# Patient Record
Sex: MALE | Race: WHITE | Employment: FULL TIME | URBAN - METROPOLITAN AREA
[De-identification: names, ages, dates, MRNs, and addresses within clinical notes are randomized per-mention and may not be internally consistent; named-entity substitution may affect disease eponyms.]

---

## 2018-02-12 ENCOUNTER — TELEPHONE (OUTPATIENT)
Dept: FAMILY MEDICINE CLINIC | Age: 53
End: 2018-02-12

## 2018-11-13 ENCOUNTER — OFFICE VISIT (OUTPATIENT)
Dept: FAMILY MEDICINE CLINIC | Age: 53
End: 2018-11-13
Payer: COMMERCIAL

## 2018-11-13 VITALS
TEMPERATURE: 98.5 F | BODY MASS INDEX: 34.69 KG/M2 | DIASTOLIC BLOOD PRESSURE: 60 MMHG | WEIGHT: 221 LBS | RESPIRATION RATE: 16 BRPM | HEIGHT: 67 IN | SYSTOLIC BLOOD PRESSURE: 122 MMHG | HEART RATE: 72 BPM

## 2018-11-13 DIAGNOSIS — E78.2 MIXED HYPERLIPIDEMIA: Chronic | ICD-10-CM

## 2018-11-13 DIAGNOSIS — K21.9 GASTROESOPHAGEAL REFLUX DISEASE, ESOPHAGITIS PRESENCE NOT SPECIFIED: Chronic | ICD-10-CM

## 2018-11-13 DIAGNOSIS — F41.9 ANXIETY: ICD-10-CM

## 2018-11-13 DIAGNOSIS — Z12.5 SCREENING FOR PROSTATE CANCER: ICD-10-CM

## 2018-11-13 DIAGNOSIS — E11.9 TYPE 2 DIABETES MELLITUS WITHOUT COMPLICATION, WITH LONG-TERM CURRENT USE OF INSULIN (HCC): Primary | Chronic | ICD-10-CM

## 2018-11-13 DIAGNOSIS — Z79.4 TYPE 2 DIABETES MELLITUS WITHOUT COMPLICATION, WITH LONG-TERM CURRENT USE OF INSULIN (HCC): Primary | Chronic | ICD-10-CM

## 2018-11-13 DIAGNOSIS — I10 ESSENTIAL HYPERTENSION: Chronic | ICD-10-CM

## 2018-11-13 DIAGNOSIS — F32.A DEPRESSION, UNSPECIFIED DEPRESSION TYPE: ICD-10-CM

## 2018-11-13 PROCEDURE — 99204 OFFICE O/P NEW MOD 45 MIN: CPT | Performed by: NURSE PRACTITIONER

## 2018-11-13 RX ORDER — FLUOXETINE HYDROCHLORIDE 20 MG/1
1 CAPSULE ORAL DAILY
Refills: 3 | COMMUNITY
Start: 2018-10-16 | End: 2018-11-13 | Stop reason: SDUPTHER

## 2018-11-13 RX ORDER — FLUOXETINE HYDROCHLORIDE 20 MG/1
20 CAPSULE ORAL DAILY
Qty: 90 CAPSULE | Refills: 3 | Status: SHIPPED | OUTPATIENT
Start: 2018-11-13 | End: 2019-08-04 | Stop reason: SDUPTHER

## 2018-11-13 RX ORDER — LISINOPRIL 10 MG/1
1 TABLET ORAL DAILY
Refills: 3 | COMMUNITY
Start: 2018-10-16 | End: 2018-11-13 | Stop reason: SDUPTHER

## 2018-11-13 RX ORDER — INSULIN GLARGINE 100 [IU]/ML
80 INJECTION, SOLUTION SUBCUTANEOUS NIGHTLY
Refills: 3 | COMMUNITY
Start: 2018-11-10 | End: 2018-11-13 | Stop reason: SDUPTHER

## 2018-11-13 RX ORDER — PEN NEEDLE, DIABETIC 31 G X1/4"
1 NEEDLE, DISPOSABLE MISCELLANEOUS
Qty: 100 EACH | Refills: 3 | Status: SHIPPED | OUTPATIENT
Start: 2018-11-13 | End: 2019-08-04 | Stop reason: SDUPTHER

## 2018-11-13 RX ORDER — LISINOPRIL 10 MG/1
10 TABLET ORAL DAILY
Qty: 30 TABLET | Refills: 3 | Status: SHIPPED | OUTPATIENT
Start: 2018-11-13 | End: 2019-07-18 | Stop reason: SDUPTHER

## 2018-11-13 RX ORDER — ATORVASTATIN CALCIUM 40 MG/1
1 TABLET, FILM COATED ORAL DAILY
Refills: 3 | COMMUNITY
Start: 2018-08-26 | End: 2018-11-13 | Stop reason: SDUPTHER

## 2018-11-13 RX ORDER — OMEPRAZOLE 40 MG/1
40 CAPSULE, DELAYED RELEASE ORAL DAILY
Qty: 90 CAPSULE | Refills: 3 | Status: SHIPPED | OUTPATIENT
Start: 2018-11-13 | End: 2019-11-15 | Stop reason: SDUPTHER

## 2018-11-13 RX ORDER — ATORVASTATIN CALCIUM 40 MG/1
40 TABLET, FILM COATED ORAL DAILY
Qty: 90 TABLET | Refills: 0 | Status: SHIPPED | OUTPATIENT
Start: 2018-11-13 | End: 2019-10-04 | Stop reason: SDUPTHER

## 2018-11-13 RX ORDER — INSULIN GLARGINE 100 [IU]/ML
80 INJECTION, SOLUTION SUBCUTANEOUS NIGHTLY
Qty: 15 PEN | Refills: 3 | Status: SHIPPED | OUTPATIENT
Start: 2018-11-13 | End: 2019-01-04 | Stop reason: SDUPTHER

## 2018-11-13 ASSESSMENT — PATIENT HEALTH QUESTIONNAIRE - PHQ9
2. FEELING DOWN, DEPRESSED OR HOPELESS: 0
1. LITTLE INTEREST OR PLEASURE IN DOING THINGS: 0
SUM OF ALL RESPONSES TO PHQ QUESTIONS 1-9: 0
SUM OF ALL RESPONSES TO PHQ QUESTIONS 1-9: 0
SUM OF ALL RESPONSES TO PHQ9 QUESTIONS 1 & 2: 0

## 2018-11-15 ASSESSMENT — ENCOUNTER SYMPTOMS
EYES NEGATIVE: 1
GASTROINTESTINAL NEGATIVE: 1
RESPIRATORY NEGATIVE: 1
ALLERGIC/IMMUNOLOGIC NEGATIVE: 1

## 2018-11-19 LAB
A/G RATIO: 2 (ref 1.5–2.5)
ALBUMIN SERPL-MCNC: 4.9 GM/DL (ref 3.5–5)
ALP BLD-CCNC: 82 IU/L (ref 41–137)
ALT SERPL-CCNC: 63 IU/L (ref 10–40)
ANION GAP SERPL CALCULATED.3IONS-SCNC: 9 MMOL/L (ref 4–12)
AST SERPL-CCNC: 27 IU/L (ref 15–41)
BILIRUB SERPL-MCNC: 0.8 MG/DL (ref 0.2–1)
BUN BLDV-MCNC: 16 MG/DL (ref 7–20)
CALCIUM SERPL-MCNC: 9 MG/DL (ref 8.8–10.5)
CHLORIDE BLD-SCNC: 104 MEQ/L (ref 101–111)
CHOLESTEROL/HDL RELATIVE RISK: 4.3 (ref 4–5)
CHOLESTEROL: 155 MG/DL
CO2: 26 MEQ/L (ref 21–32)
CREAT SERPL-MCNC: 0.97 MG/DL (ref 0.7–1.3)
CREATININE CLEARANCE: >60
DIRECT-LDL / HDL RISK: 3
ESTIMATED AVERAGE GLUCOSE: 237 MG/DL
GLUCOSE: 138 MG/DL (ref 70–110)
HBA1C MFR BLD: 9.9 % (ref 4.4–6.4)
HDLC SERPL-MCNC: 36 MG/DL
LDL CHOLESTEROL DIRECT: 111 MG/DL
POTASSIUM SERPL-SCNC: 4.2 MEQ/L (ref 3.6–5)
PROSTATE SPECIFIC ANTIGEN: 0.52 NG/ML
SODIUM BLD-SCNC: 139 MEQ/L (ref 135–145)
TOTAL PROTEIN: 7.3 G/DL (ref 6.2–8)
TRIGL SERPL-MCNC: 163 MG/DL
VLDLC SERPL CALC-MCNC: 32 MG/DL

## 2018-11-21 ENCOUNTER — TELEPHONE (OUTPATIENT)
Dept: FAMILY MEDICINE CLINIC | Age: 53
End: 2018-11-21

## 2018-11-21 DIAGNOSIS — E11.9 TYPE 2 DIABETES MELLITUS WITHOUT COMPLICATION, WITH LONG-TERM CURRENT USE OF INSULIN (HCC): ICD-10-CM

## 2018-11-21 DIAGNOSIS — E78.2 MIXED HYPERLIPIDEMIA: Chronic | ICD-10-CM

## 2018-11-21 DIAGNOSIS — R74.8 ELEVATED LIVER ENZYMES: Primary | ICD-10-CM

## 2018-11-21 DIAGNOSIS — Z79.4 TYPE 2 DIABETES MELLITUS WITHOUT COMPLICATION, WITH LONG-TERM CURRENT USE OF INSULIN (HCC): ICD-10-CM

## 2019-01-04 RX ORDER — INSULIN GLARGINE 100 [IU]/ML
80 INJECTION, SOLUTION SUBCUTANEOUS NIGHTLY
Qty: 15 PEN | Refills: 3 | Status: SHIPPED | OUTPATIENT
Start: 2019-01-04 | End: 2019-01-16 | Stop reason: SDUPTHER

## 2019-01-16 ENCOUNTER — PATIENT MESSAGE (OUTPATIENT)
Dept: FAMILY MEDICINE CLINIC | Age: 54
End: 2019-01-16

## 2019-01-17 RX ORDER — INSULIN GLARGINE 100 [IU]/ML
80 INJECTION, SOLUTION SUBCUTANEOUS NIGHTLY
Qty: 8 PEN | Refills: 11 | Status: SHIPPED | OUTPATIENT
Start: 2019-01-17 | End: 2019-10-02 | Stop reason: SDUPTHER

## 2019-02-14 LAB
ALT SERPL-CCNC: 35 IU/L (ref 10–40)
AST SERPL-CCNC: 21 IU/L (ref 15–41)
CHOLESTEROL/HDL RELATIVE RISK: 4.6 (ref 4–5)
CHOLESTEROL: 153 MG/DL
DIRECT-LDL / HDL RISK: 3.6
HDLC SERPL-MCNC: 33 MG/DL
LDL CHOLESTEROL DIRECT: 122 MG/DL
TRIGL SERPL-MCNC: 117 MG/DL
VLDLC SERPL CALC-MCNC: 23 MG/DL

## 2019-02-21 ENCOUNTER — TELEPHONE (OUTPATIENT)
Dept: FAMILY MEDICINE CLINIC | Age: 54
End: 2019-02-21

## 2019-02-21 LAB
ESTIMATED AVERAGE GLUCOSE: 148 MG/DL
HBA1C MFR BLD: 6.8 % (ref 4.4–6.4)

## 2019-07-18 RX ORDER — LISINOPRIL 10 MG/1
10 TABLET ORAL DAILY
Qty: 90 TABLET | Refills: 0 | Status: SHIPPED | OUTPATIENT
Start: 2019-07-18 | End: 2019-10-29 | Stop reason: SDUPTHER

## 2019-07-26 ENCOUNTER — HOSPITAL ENCOUNTER (OUTPATIENT)
Age: 54
Discharge: HOME | End: 2019-07-26

## 2019-07-26 LAB
HBV SURFACE AB TITR SER: POSITIVE {TITER}
RUBELLA: 93.6 IU/ML

## 2019-07-28 LAB — RUBEOLA IGG: > 300 AU/ML

## 2019-07-29 LAB
MUMPS IGG TITER: 1.55
VZV IGG SER QL IA: 1.22

## 2019-08-05 RX ORDER — FLUOXETINE HYDROCHLORIDE 20 MG/1
20 CAPSULE ORAL DAILY
Qty: 90 CAPSULE | Refills: 0 | Status: SHIPPED | OUTPATIENT
Start: 2019-08-05 | End: 2019-10-02 | Stop reason: DRUGHIGH

## 2019-08-05 RX ORDER — PEN NEEDLE, DIABETIC 31 G X1/4"
1 NEEDLE, DISPOSABLE MISCELLANEOUS
Qty: 100 EACH | Refills: 0 | Status: SHIPPED | OUTPATIENT
Start: 2019-08-05 | End: 2019-12-18 | Stop reason: SDUPTHER

## 2019-10-02 ENCOUNTER — OFFICE VISIT (OUTPATIENT)
Dept: FAMILY MEDICINE CLINIC | Age: 54
End: 2019-10-02
Payer: COMMERCIAL

## 2019-10-02 VITALS
HEART RATE: 60 BPM | TEMPERATURE: 99.2 F | BODY MASS INDEX: 33.27 KG/M2 | DIASTOLIC BLOOD PRESSURE: 62 MMHG | WEIGHT: 212 LBS | RESPIRATION RATE: 12 BRPM | SYSTOLIC BLOOD PRESSURE: 116 MMHG | HEIGHT: 67 IN

## 2019-10-02 DIAGNOSIS — F33.8 SEASONAL AFFECTIVE DISORDER (HCC): ICD-10-CM

## 2019-10-02 DIAGNOSIS — Z79.4 TYPE 2 DIABETES MELLITUS WITHOUT COMPLICATION, WITH LONG-TERM CURRENT USE OF INSULIN (HCC): ICD-10-CM

## 2019-10-02 DIAGNOSIS — E78.2 MIXED HYPERLIPIDEMIA: Chronic | ICD-10-CM

## 2019-10-02 DIAGNOSIS — F41.8 DEPRESSION WITH ANXIETY: Primary | ICD-10-CM

## 2019-10-02 DIAGNOSIS — E11.9 TYPE 2 DIABETES MELLITUS WITHOUT COMPLICATION, WITH LONG-TERM CURRENT USE OF INSULIN (HCC): ICD-10-CM

## 2019-10-02 DIAGNOSIS — I10 ESSENTIAL HYPERTENSION: ICD-10-CM

## 2019-10-02 DIAGNOSIS — R74.8 ELEVATED LIVER ENZYMES: ICD-10-CM

## 2019-10-02 DIAGNOSIS — F33.1 MODERATE EPISODE OF RECURRENT MAJOR DEPRESSIVE DISORDER (HCC): ICD-10-CM

## 2019-10-02 DIAGNOSIS — Z12.5 SCREENING FOR PROSTATE CANCER: ICD-10-CM

## 2019-10-02 DIAGNOSIS — E78.2 MIXED HYPERLIPIDEMIA: ICD-10-CM

## 2019-10-02 LAB
ALBUMIN SERPL-MCNC: 4.5 G/DL (ref 3.5–5.1)
ALP BLD-CCNC: 80 U/L (ref 38–126)
ALT SERPL-CCNC: 27 U/L (ref 11–66)
ANION GAP SERPL CALCULATED.3IONS-SCNC: 13 MEQ/L (ref 8–16)
AST SERPL-CCNC: 18 U/L (ref 5–40)
AVERAGE GLUCOSE: 144 MG/DL (ref 70–126)
BILIRUB SERPL-MCNC: 0.3 MG/DL (ref 0.3–1.2)
BUN BLDV-MCNC: 18 MG/DL (ref 7–22)
CALCIUM SERPL-MCNC: 9.4 MG/DL (ref 8.5–10.5)
CHLORIDE BLD-SCNC: 102 MEQ/L (ref 98–111)
CHOLESTEROL, TOTAL: 194 MG/DL (ref 100–199)
CO2: 25 MEQ/L (ref 23–33)
CREAT SERPL-MCNC: 0.8 MG/DL (ref 0.4–1.2)
GFR SERPL CREATININE-BSD FRML MDRD: > 90 ML/MIN/1.73M2
GLUCOSE BLD-MCNC: 74 MG/DL (ref 70–108)
HBA1C MFR BLD: 6.8 % (ref 4.4–6.4)
HDLC SERPL-MCNC: 37 MG/DL
LDL CHOLESTEROL CALCULATED: 133 MG/DL
POTASSIUM SERPL-SCNC: 4.1 MEQ/L (ref 3.5–5.2)
PROSTATE SPECIFIC ANTIGEN: 0.42 NG/ML (ref 0–1)
SODIUM BLD-SCNC: 140 MEQ/L (ref 135–145)
TOTAL PROTEIN: 6.7 G/DL (ref 6.1–8)
TRIGL SERPL-MCNC: 119 MG/DL (ref 0–199)

## 2019-10-02 PROCEDURE — 99214 OFFICE O/P EST MOD 30 MIN: CPT | Performed by: NURSE PRACTITIONER

## 2019-10-02 RX ORDER — FLUOXETINE HYDROCHLORIDE 40 MG/1
40 CAPSULE ORAL DAILY
Qty: 90 CAPSULE | Refills: 3 | Status: SHIPPED | OUTPATIENT
Start: 2019-10-02 | End: 2019-10-04 | Stop reason: SDUPTHER

## 2019-10-02 RX ORDER — INSULIN GLARGINE 100 [IU]/ML
90 INJECTION, SOLUTION SUBCUTANEOUS NIGHTLY
Qty: 8 PEN | Refills: 11 | Status: SHIPPED | OUTPATIENT
Start: 2019-10-02 | End: 2019-11-15 | Stop reason: DRUGHIGH

## 2019-10-02 ASSESSMENT — ENCOUNTER SYMPTOMS
COUGH: 0
NAUSEA: 0
CONSTIPATION: 0
DIARRHEA: 0
SHORTNESS OF BREATH: 0
SINUS PRESSURE: 0
WHEEZING: 0
ABDOMINAL DISTENTION: 0
BLOOD IN STOOL: 0
EYE DISCHARGE: 0
RHINORRHEA: 0
SINUS PAIN: 0
EYE ITCHING: 0
PHOTOPHOBIA: 0

## 2019-10-04 ENCOUNTER — TELEPHONE (OUTPATIENT)
Dept: FAMILY MEDICINE CLINIC | Age: 54
End: 2019-10-04

## 2019-10-04 RX ORDER — FLUOXETINE HYDROCHLORIDE 40 MG/1
40 CAPSULE ORAL DAILY
Qty: 90 CAPSULE | Refills: 3 | Status: SHIPPED | OUTPATIENT
Start: 2019-10-04 | End: 2020-05-04 | Stop reason: SDUPTHER

## 2019-10-04 RX ORDER — ATORVASTATIN CALCIUM 40 MG/1
40 TABLET, FILM COATED ORAL DAILY
Qty: 90 TABLET | Refills: 3 | Status: SHIPPED | OUTPATIENT
Start: 2019-10-04 | End: 2020-10-26

## 2019-10-29 RX ORDER — LISINOPRIL 10 MG/1
10 TABLET ORAL DAILY
Qty: 90 TABLET | Refills: 0 | Status: SHIPPED | OUTPATIENT
Start: 2019-10-29 | End: 2019-11-15 | Stop reason: SDUPTHER

## 2019-11-15 ENCOUNTER — OFFICE VISIT (OUTPATIENT)
Dept: FAMILY MEDICINE CLINIC | Age: 54
End: 2019-11-15
Payer: COMMERCIAL

## 2019-11-15 VITALS
RESPIRATION RATE: 16 BRPM | HEART RATE: 66 BPM | DIASTOLIC BLOOD PRESSURE: 62 MMHG | SYSTOLIC BLOOD PRESSURE: 110 MMHG | TEMPERATURE: 98 F | WEIGHT: 209.6 LBS | BODY MASS INDEX: 32.83 KG/M2

## 2019-11-15 DIAGNOSIS — F41.8 DEPRESSION WITH ANXIETY: ICD-10-CM

## 2019-11-15 DIAGNOSIS — F41.9 ANXIETY: ICD-10-CM

## 2019-11-15 DIAGNOSIS — K21.9 GASTROESOPHAGEAL REFLUX DISEASE, ESOPHAGITIS PRESENCE NOT SPECIFIED: ICD-10-CM

## 2019-11-15 DIAGNOSIS — I10 ESSENTIAL HYPERTENSION: ICD-10-CM

## 2019-11-15 DIAGNOSIS — E78.2 MIXED HYPERLIPIDEMIA: ICD-10-CM

## 2019-11-15 DIAGNOSIS — F32.A DEPRESSION, UNSPECIFIED DEPRESSION TYPE: ICD-10-CM

## 2019-11-15 DIAGNOSIS — Z79.4 TYPE 2 DIABETES MELLITUS WITHOUT COMPLICATION, WITH LONG-TERM CURRENT USE OF INSULIN (HCC): Primary | ICD-10-CM

## 2019-11-15 DIAGNOSIS — E11.9 TYPE 2 DIABETES MELLITUS WITHOUT COMPLICATION, WITH LONG-TERM CURRENT USE OF INSULIN (HCC): Primary | ICD-10-CM

## 2019-11-15 DIAGNOSIS — F33.1 MODERATE EPISODE OF RECURRENT MAJOR DEPRESSIVE DISORDER (HCC): ICD-10-CM

## 2019-11-15 DIAGNOSIS — F33.8 SEASONAL AFFECTIVE DISORDER (HCC): ICD-10-CM

## 2019-11-15 DIAGNOSIS — R74.8 ELEVATED LIVER ENZYMES: ICD-10-CM

## 2019-11-15 PROCEDURE — 99214 OFFICE O/P EST MOD 30 MIN: CPT | Performed by: NURSE PRACTITIONER

## 2019-11-15 RX ORDER — LISINOPRIL 10 MG/1
10 TABLET ORAL DAILY
Qty: 90 TABLET | Refills: 3 | Status: SHIPPED | OUTPATIENT
Start: 2019-11-15 | End: 2020-05-04 | Stop reason: SDUPTHER

## 2019-11-15 RX ORDER — INSULIN GLARGINE 100 [IU]/ML
100 INJECTION, SOLUTION SUBCUTANEOUS NIGHTLY
Qty: 8 PEN | Refills: 11 | Status: SHIPPED | OUTPATIENT
Start: 2019-11-15 | End: 2020-02-13 | Stop reason: SDUPTHER

## 2019-11-15 RX ORDER — OMEPRAZOLE 40 MG/1
40 CAPSULE, DELAYED RELEASE ORAL DAILY
Qty: 90 CAPSULE | Refills: 3 | Status: SHIPPED | OUTPATIENT
Start: 2019-11-15 | End: 2020-05-24 | Stop reason: SDUPTHER

## 2019-12-18 RX ORDER — PEN NEEDLE, DIABETIC 31 G X1/4"
1 NEEDLE, DISPOSABLE MISCELLANEOUS
Qty: 100 EACH | Refills: 0 | Status: SHIPPED | OUTPATIENT
Start: 2019-12-18 | End: 2020-05-04 | Stop reason: SDUPTHER

## 2020-01-15 ENCOUNTER — TELEPHONE (OUTPATIENT)
Dept: FAMILY MEDICINE CLINIC | Age: 55
End: 2020-01-15

## 2020-02-13 RX ORDER — INSULIN GLARGINE 100 [IU]/ML
100 INJECTION, SOLUTION SUBCUTANEOUS NIGHTLY
Qty: 8 PEN | Refills: 11 | Status: SHIPPED | OUTPATIENT
Start: 2020-02-13 | End: 2020-12-14 | Stop reason: SDUPTHER

## 2020-03-14 ENCOUNTER — HOSPITAL ENCOUNTER (EMERGENCY)
Age: 55
Discharge: HOME | End: 2020-03-14
Attending: EMERGENCY MEDICINE
Payer: COMMERCIAL

## 2020-03-14 ENCOUNTER — APPOINTMENT (OUTPATIENT)
Dept: GENERAL RADIOLOGY | Age: 55
End: 2020-03-14
Payer: COMMERCIAL

## 2020-03-14 VITALS
HEART RATE: 88 BPM | OXYGEN SATURATION: 98 % | SYSTOLIC BLOOD PRESSURE: 137 MMHG | WEIGHT: 210 LBS | RESPIRATION RATE: 16 BRPM | TEMPERATURE: 98.9 F | BODY MASS INDEX: 32.96 KG/M2 | DIASTOLIC BLOOD PRESSURE: 70 MMHG | HEIGHT: 67 IN

## 2020-03-14 LAB
ALBUMIN SERPL-MCNC: 4.2 G/DL (ref 3.5–5.1)
ALP BLD-CCNC: 88 U/L (ref 38–126)
ALT SERPL-CCNC: 98 U/L (ref 11–66)
ANION GAP SERPL CALCULATED.3IONS-SCNC: 13 MEQ/L (ref 8–16)
AST SERPL-CCNC: 63 U/L (ref 5–40)
BASOPHILS # BLD: 0.3 %
BASOPHILS ABSOLUTE: 0 THOU/MM3 (ref 0–0.1)
BILIRUB SERPL-MCNC: < 0.2 MG/DL (ref 0.3–1.2)
BILIRUBIN DIRECT: < 0.2 MG/DL (ref 0–0.3)
BUN BLDV-MCNC: 14 MG/DL (ref 7–22)
CALCIUM SERPL-MCNC: 9.1 MG/DL (ref 8.5–10.5)
CHLORIDE BLD-SCNC: 101 MEQ/L (ref 98–111)
CO2: 22 MEQ/L (ref 23–33)
CREAT SERPL-MCNC: 1.4 MG/DL (ref 0.4–1.2)
EOSINOPHIL # BLD: 0.3 %
EOSINOPHILS ABSOLUTE: 0 THOU/MM3 (ref 0–0.4)
ERYTHROCYTE [DISTWIDTH] IN BLOOD BY AUTOMATED COUNT: 13.7 % (ref 11.5–14.5)
ERYTHROCYTE [DISTWIDTH] IN BLOOD BY AUTOMATED COUNT: 47.8 FL (ref 35–45)
FLU A ANTIGEN: NEGATIVE
FLU B ANTIGEN: NEGATIVE
GFR SERPL CREATININE-BSD FRML MDRD: 52 ML/MIN/1.73M2
GLUCOSE BLD-MCNC: 177 MG/DL (ref 70–108)
HCT VFR BLD CALC: 43.6 % (ref 42–52)
HEMOGLOBIN: 14.1 GM/DL (ref 14–18)
IMMATURE GRANS (ABS): 0.01 THOU/MM3 (ref 0–0.07)
IMMATURE GRANULOCYTES: 0.2 %
LYMPHOCYTES # BLD: 8.2 %
LYMPHOCYTES ABSOLUTE: 0.5 THOU/MM3 (ref 1–4.8)
MCH RBC QN AUTO: 30.8 PG (ref 26–33)
MCHC RBC AUTO-ENTMCNC: 32.3 GM/DL (ref 32.2–35.5)
MCV RBC AUTO: 95.2 FL (ref 80–94)
MONOCYTES # BLD: 14.2 %
MONOCYTES ABSOLUTE: 0.9 THOU/MM3 (ref 0.4–1.3)
NUCLEATED RED BLOOD CELLS: 0 /100 WBC
OSMOLALITY CALCULATION: 276.8 MOSMOL/KG (ref 275–300)
PLATELET # BLD: 255 THOU/MM3 (ref 130–400)
PMV BLD AUTO: 9.4 FL (ref 9.4–12.4)
POTASSIUM SERPL-SCNC: 4 MEQ/L (ref 3.5–5.2)
PROCALCITONIN: 0.14 NG/ML (ref 0.01–0.09)
RBC # BLD: 4.58 MILL/MM3 (ref 4.7–6.1)
SEG NEUTROPHILS: 76.8 %
SEGMENTED NEUTROPHILS ABSOLUTE COUNT: 4.8 THOU/MM3 (ref 1.8–7.7)
SODIUM BLD-SCNC: 136 MEQ/L (ref 135–145)
TOTAL PROTEIN: 6.7 G/DL (ref 6.1–8)
WBC # BLD: 6.3 THOU/MM3 (ref 4.8–10.8)

## 2020-03-14 PROCEDURE — 71046 X-RAY EXAM CHEST 2 VIEWS: CPT

## 2020-03-14 PROCEDURE — 85025 COMPLETE CBC W/AUTO DIFF WBC: CPT

## 2020-03-14 PROCEDURE — 94640 AIRWAY INHALATION TREATMENT: CPT

## 2020-03-14 PROCEDURE — 2580000003 HC RX 258: Performed by: PHYSICIAN ASSISTANT

## 2020-03-14 PROCEDURE — 99213 OFFICE O/P EST LOW 20 MIN: CPT | Performed by: EMERGENCY MEDICINE

## 2020-03-14 PROCEDURE — 6370000000 HC RX 637 (ALT 250 FOR IP): Performed by: PHYSICIAN ASSISTANT

## 2020-03-14 PROCEDURE — 99285 EMERGENCY DEPT VISIT HI MDM: CPT

## 2020-03-14 PROCEDURE — 84145 PROCALCITONIN (PCT): CPT

## 2020-03-14 PROCEDURE — 82248 BILIRUBIN DIRECT: CPT

## 2020-03-14 PROCEDURE — 99205 OFFICE O/P NEW HI 60 MIN: CPT

## 2020-03-14 PROCEDURE — 87804 INFLUENZA ASSAY W/OPTIC: CPT

## 2020-03-14 PROCEDURE — 80053 COMPREHEN METABOLIC PANEL: CPT

## 2020-03-14 PROCEDURE — 36415 COLL VENOUS BLD VENIPUNCTURE: CPT

## 2020-03-14 RX ORDER — 0.9 % SODIUM CHLORIDE 0.9 %
1000 INTRAVENOUS SOLUTION INTRAVENOUS ONCE
Status: COMPLETED | OUTPATIENT
Start: 2020-03-14 | End: 2020-03-14

## 2020-03-14 RX ORDER — BENZONATATE 100 MG/1
100 CAPSULE ORAL 3 TIMES DAILY PRN
Qty: 21 CAPSULE | Refills: 0 | Status: SHIPPED | OUTPATIENT
Start: 2020-03-14 | End: 2020-03-21

## 2020-03-14 RX ORDER — ALBUTEROL SULFATE 90 UG/1
2 POWDER, METERED RESPIRATORY (INHALATION) EVERY 4 HOURS PRN
Qty: 1 INHALER | Refills: 0 | Status: SHIPPED | OUTPATIENT
Start: 2020-03-14 | End: 2020-06-17 | Stop reason: ALTCHOICE

## 2020-03-14 RX ORDER — ALBUTEROL SULFATE 90 UG/1
2 AEROSOL, METERED RESPIRATORY (INHALATION) ONCE
Status: COMPLETED | OUTPATIENT
Start: 2020-03-14 | End: 2020-03-14

## 2020-03-14 RX ORDER — ALBUTEROL SULFATE 2.5 MG/3ML
2.5 SOLUTION RESPIRATORY (INHALATION) EVERY 6 HOURS PRN
Qty: 120 EACH | Refills: 0 | Status: SHIPPED | OUTPATIENT
Start: 2020-03-14 | End: 2020-06-17 | Stop reason: ALTCHOICE

## 2020-03-14 RX ORDER — BENZONATATE 100 MG/1
100 CAPSULE ORAL ONCE
Status: COMPLETED | OUTPATIENT
Start: 2020-03-14 | End: 2020-03-14

## 2020-03-14 RX ADMIN — ALBUTEROL SULFATE 2 PUFF: 90 AEROSOL, METERED RESPIRATORY (INHALATION) at 20:50

## 2020-03-14 RX ADMIN — SODIUM CHLORIDE 1000 ML: 9 INJECTION, SOLUTION INTRAVENOUS at 19:35

## 2020-03-14 RX ADMIN — BENZONATATE 100 MG: 100 CAPSULE ORAL at 21:28

## 2020-03-14 ASSESSMENT — ENCOUNTER SYMPTOMS
NAUSEA: 0
EYE REDNESS: 0
CONSTIPATION: 0
STRIDOR: 0
RHINORRHEA: 1
WHEEZING: 0
EYE DISCHARGE: 0
VOMITING: 0
VOICE CHANGE: 0
SHORTNESS OF BREATH: 0
DIARRHEA: 0
EYE PAIN: 0
SINUS PRESSURE: 1
SINUS PRESSURE: 0
TROUBLE SWALLOWING: 0
SINUS PAIN: 0
BACK PAIN: 0
COUGH: 1
COLOR CHANGE: 0
ABDOMINAL PAIN: 0
SORE THROAT: 0

## 2020-03-14 ASSESSMENT — PAIN SCALES - GENERAL
PAINLEVEL_OUTOF10: 1
PAINLEVEL_OUTOF10: 1

## 2020-03-14 ASSESSMENT — PAIN DESCRIPTION - PAIN TYPE: TYPE: ACUTE PAIN

## 2020-03-14 ASSESSMENT — PAIN DESCRIPTION - DESCRIPTORS: DESCRIPTORS: ACHING

## 2020-03-14 ASSESSMENT — PAIN DESCRIPTION - FREQUENCY: FREQUENCY: CONTINUOUS

## 2020-03-14 NOTE — ED PROVIDER NOTES
nervous/anxious. All other systems reviewed and are negative. PAST MEDICAL HISTORY         Diagnosis Date    Depression with anxiety     Diabetes mellitus (HCC)     GERD (gastroesophageal reflux disease)     Hyperlipidemia     Hypertension        SURGICAL HISTORY     Patient  has a past surgical history that includes Breast surgery (1996); Tonsillectomy (1973); Cardiac catheterization (4-2008); and shoulder surgery (2017). CURRENT MEDICATIONS       Previous Medications    ATORVASTATIN (LIPITOR) 40 MG TABLET    Take 1 tablet by mouth daily    DAPAGLIFLOZIN (FARXIGA) 10 MG TABLET    Take 1 tablet by mouth every morning    FLUOXETINE (PROZAC) 40 MG CAPSULE    Take 1 capsule by mouth daily    INSULIN LISPRO (HUMALOG KWIKPEN) 100 UNIT/ML PEN    Inject 35-45 Units into the skin 3 times daily (before meals)    INSULIN PEN NEEDLE (PEN NEEDLES) 31G X 6 MM MISC    1 each by Does not apply route 5 times daily    LANTUS SOLOSTAR 100 UNIT/ML INJECTION PEN    Inject 100 Units into the skin nightly    LISINOPRIL (PRINIVIL;ZESTRIL) 10 MG TABLET    Take 1 tablet by mouth daily    OMEPRAZOLE (PRILOSEC) 40 MG DELAYED RELEASE CAPSULE    Take 1 capsule by mouth daily    SITAGLIPTIN (JANUVIA) 100 MG TABLET    Take 1 tablet by mouth daily       ALLERGIES     Patient is is allergic to penicillins; lipitor; and contrast [gadolinium derivatives]. FAMILY HISTORY     Patient'sfamily history is not on file. SOCIAL HISTORY     Patient  reports that he quit smoking about 14 years ago. His smoking use included cigarettes. He started smoking about 39 years ago. He has a 25.00 pack-year smoking history. He has never used smokeless tobacco. He reports current alcohol use. PHYSICAL EXAM     ED TRIAGE VITALS  BP: (!) 163/79, Temp: 100 °F (37.8 °C), Pulse: 98, Resp: 16, SpO2: 96 %  Physical Exam  Vitals signs and nursing note reviewed. Constitutional:       General: He is not in acute distress.      Appearance: He is days      to [facility]      to [facility]    DISCHARGE MEDICATIONS:  New Prescriptions    No medications on file     Current Discharge Medication List          MD Gisela Hinojosa MD  03/14/20 [facility] 9[address] Lisa Shukla MD  03/14/20 4440

## 2020-03-14 NOTE — ED PROVIDER NOTES
[facility] EMERGENCY DEPT      CHIEF COMPLAINT       Chief Complaint   Patient presents with    Generalized Body Aches     cough, runny nose , exposed to flu a (his dad), has history of pneumonia       Nurses Notes reviewed and I agree except asnoted in the HPI. HISTORY OFPRESENT ILLNESS    Mita Currie is a 62 y.o. male who presents to  the emergency department for evaluation of a cough, sinus pressure, and runny nose. The patient presents to the emergency department from urgent care. Per the urgent care note, the patient has a 2-day history of a productive cough, a low-grade fever of approximately 100F, generalized body aches, and fatigue. The patient had reported to his provider at that time that these symptoms were preceded by upper respiratory symptoms that have been ongoing for the past 2 weeks. However, the patient reported to me that his symptoms have been ongoing for approximately 3 weeks. The patient states that his symptoms appear to be improving 2 days ago, although he reports waking up with significantly worse symptoms today. The patient reported to me that his cough is dry. The patient reports associated sinus pressure and a runny nose, although he denies any sinus pain or nasal congestion. The patient does report having postnasal drip, which the patient states has making his throat feel sore. Patient denies any ear pain or drainage. The patient denies any shortness of breath. The patient reports sensation of chest tightness and pain only when he coughs, but he denies any chest pain or discomfort when he takes a deep breath or at rest.  The patient reported having a headache earlier today that was relieved with the use of Aleve. The patient reports taking Mucinex and Advil that he states is somewhat helped his symptoms. The patient reports that his father recently returned from traveling to [place] and tested positive for influenza A yesterday.   The patient reports receiving an medications which have NOT CHANGED    Details   LANTUS SOLOSTAR 100 UNIT/ML injection pen Inject 100 Units into the skin nightly, Disp-8 pen, R-11, DAWNormal      Insulin Pen Needle (PEN NEEDLES) 31G X 6 MM MISC 5 TIMES DAILY Starting Wed 12/18/2019, Disp-100 each, R-0, Normal      lisinopril (PRINIVIL;ZESTRIL) 10 MG tablet Take 1 tablet by mouth daily, Disp-90 tablet, R-3Normal      omeprazole (PRILOSEC) 40 MG delayed release capsule Take 1 capsule by mouth daily, Disp-90 capsule, R-3Normal      dapagliflozin (FARXIGA) 10 MG tablet Take 1 tablet by mouth every morning, Disp-90 tablet, R-1Normal      atorvastatin (LIPITOR) 40 MG tablet Take 1 tablet by mouth daily, Disp-90 tablet, R-3Normal      FLUoxetine (PROZAC) 40 MG capsule Take 1 capsule by mouth daily, Disp-90 capsule, R-3Normal      SITagliptin (JANUVIA) 100 MG tablet Take 1 tablet by mouth daily, Disp-90 tablet, R-1Normal      insulin lispro (HUMALOG KWIKPEN) 100 UNIT/ML pen Inject 35-45 Units into the skin 3 times daily (before meals), Disp-10 pen, R-0Normal             ALLERGIES     is allergic to penicillins; lipitor; and contrast [gadolinium derivatives]. FAMILY HISTORY     has no family status information on file. [unfilled]    [employer] HISTORY      reports that he quit smoking about 14 years ago. His smoking use included cigarettes. He started smoking about 39 years ago. He has a 25.00 pack-year smoking history. He has never used smokeless tobacco. He reports current alcohol use. PHYSICAL EXAM     INITIAL VITALS:  height is 5' 7\" (1.702 m) and weight is 210 lb (95.3 kg). His oral temperature is 98.9 °F (37.2 °C). His blood pressure is 137/70 and his pulse is 88. His respiration is 16 and oxygen saturation is 98%. Physical Exam  Vitals signs and nursing note reviewed. Constitutional:       General: He is not in acute distress. Appearance: Normal appearance. He is well-developed. He is obese.  He is not ill-appearing, toxic-appearing or diaphoretic. HENT:      Head: Normocephalic and atraumatic. Right Ear: Tympanic membrane, ear canal and external ear normal.      Left Ear: Tympanic membrane, ear canal and external ear normal.      Nose: Nose normal. No congestion or rhinorrhea. Mouth/Throat:      Lips: Pink. Mouth: Mucous membranes are moist. No oral lesions. Tongue: No lesions. Palate: No lesions. Pharynx: Oropharynx is clear. Uvula midline. No pharyngeal swelling, oropharyngeal exudate, posterior oropharyngeal erythema or uvula swelling. Eyes:      General: No scleral icterus. Right eye: No discharge. Left eye: No discharge. Conjunctiva/sclera: Conjunctivae normal.      Pupils: Pupils are equal, round, and reactive to light. Neck:      Musculoskeletal: Normal range of motion and neck supple. No neck rigidity or muscular tenderness. Cardiovascular:      Rate and Rhythm: Normal rate and regular rhythm. Pulses: Normal pulses. Heart sounds: Normal heart sounds. No murmur. No friction rub. No gallop. Pulmonary:      Effort: Pulmonary effort is normal. No respiratory distress. Breath sounds: Normal breath sounds. No stridor. No wheezing, rhonchi or rales. Chest:      Chest wall: No tenderness. Abdominal:      General: Bowel sounds are normal. There is no distension. Palpations: Abdomen is soft. There is no mass. Tenderness: There is no abdominal tenderness. There is no guarding or rebound. Hernia: No hernia is present. Musculoskeletal: Normal range of motion. Lymphadenopathy:      Head:      Right side of head: No submental, submandibular, tonsillar, preauricular or posterior auricular adenopathy. Left side of head: No submental, submandibular, tonsillar, preauricular or posterior auricular adenopathy. Cervical: No cervical adenopathy. Upper Body:      Right upper body: No supraclavicular adenopathy.       Left upper body: No components within normal limits   PROCALCITONIN - Abnormal; Notable for the following components:    Procalcitonin 0.14 (*)     All other components within normal limits   GLOMERULAR FILTRATION RATE, ESTIMATED - Abnormal; Notable for the following components:    Est, Glom Filt Rate 52 (*)     All other components within normal limits   RAPID INFLUENZA A/B ANTIGENS   RAPID INFLUENZA A/B ANTIGENS   ANION GAP   OSMOLALITY   URINE RT REFLEX TO CULTURE       EMERGENCY DEPARTMENT COURSE:   Vitals:    Vitals:    03/14/20 1848 03/14/20 1935 03/14/20 2033 03/14/20 2050   BP:  137/70     Pulse: 88 90 76 88   Resp: 15 15  16   Temp:       TempSrc:       SpO2: 97% 99%  98%   Weight:       Height:         The patient was seen and evaluated within the ED today with a cough, sinus pressure, and runny nose. Within the department, I observed the patient's vital signs to be within acceptable range, and he was afebrile. On exam, I appreciated clear lung sounds. There is no chest wall or abdominal tenderness. Radiologic studies within the department revealed no acute intrapulmonary process, infiltrations, effusions, or consolidations. Laboratory work revealed a creatinine of 1.4, which is elevated above the patient's typical baseline of 0.8-0.9. However, the patient's last lab draws were obtained last year. White blood cell count is within normal range. Procalcitonin is mildly elevated at 0.14. Flu swab is negative. Within the department, the patient was treated with IV saline, Tessalon Perles, and albuterol inhaler. I observed the patient's condition to improve during the duration of the stay. My attending physician and I do not suspect sepsis, due to the patient's vital signs and reassuring lab findings. My attending physician and I also do not suspect an JO ANN, as the patient's creatinine is only somewhat elevated above acceptable range.   We did hydrate the patient while in this department, and we feel that the patient is safe to be discharged home. The patient is amenable with this plan. I explained to the patient that I believe his URI symptoms to be viral in origin and that I do not believe the use of antibiotics is appropriate or likely to be beneficial. The patient vocalized understanding of and agreement with this assessment. The patient will continue symptomatic treatment and ensure adequate hydration and food intake. Respiratory staff was called, and they set the patient up with a nebulizer unit to take home. I explained my proposed course of treatment to the patient, who was amenable to my treatment and discharge decisions. The patient was discharged home in stable condition with prescriptions for Proventil nebulizer solution, albuterol inhaler, and Tessalon Perles, and the patient will return to the ED if the symptoms become more severe in nature or otherwise change. Return precautions were given. CRITICAL CARE:   None    CONSULTS:  Discussed the case with my attending physician in the Emergency Department, Dr. Santana Brown, who agreed with my workup, treatment, and disposition decisions. Agrees that the patient does not appear to be septic upon his exam and work-up. Does not believe that the patient's creatinine elevation represents an JO ANN. Advises discharge to home. PROCEDURES:  None    FINAL IMPRESSION      1. Bronchitis          DISPOSITION/PLAN     1. Bronchitis      I have given the patient strict written and verbal instructions about care at home, follow-up, and signs and symptoms of worsening of condition, and the patient did verbalize understanding of these instructions. Patient was discharged in stable condition. Will return if symptoms change or worsen, or for any sign or symptom deemed emergent by the patient or family members. Follow up as an outpatient or sooner if symptoms warrant.      PATIENT REFERRED TO:  Angel Carrasco MD  [address] [place] [id number] [phone]    Call

## 2020-03-15 ASSESSMENT — ENCOUNTER SYMPTOMS: BACK PAIN: 0

## 2020-03-15 NOTE — ED NOTES
Respiratory in to evaluate patient and give treatment as ordered.       Maggy Carolina, RN  03/14/20 2050

## 2020-03-15 NOTE — PROGRESS NOTES
Home nebulizer instructions given and mdi with spacer instructions given. Patient was provided with albuterol mdi.

## 2020-03-16 ENCOUNTER — TELEPHONE (OUTPATIENT)
Dept: FAMILY MEDICINE CLINIC | Age: 55
End: 2020-03-16

## 2020-03-16 ENCOUNTER — HOSPITAL ENCOUNTER (EMERGENCY)
Age: 55
Discharge: HOME | End: 2020-03-16
Payer: COMMERCIAL

## 2020-03-16 VITALS
BODY MASS INDEX: 32.89 KG/M2 | RESPIRATION RATE: 16 BRPM | WEIGHT: 210 LBS | SYSTOLIC BLOOD PRESSURE: 138 MMHG | DIASTOLIC BLOOD PRESSURE: 72 MMHG | TEMPERATURE: 98.6 F | HEART RATE: 66 BPM | OXYGEN SATURATION: 98 %

## 2020-03-16 PROCEDURE — 99212 OFFICE O/P EST SF 10 MIN: CPT | Performed by: NURSE PRACTITIONER

## 2020-03-16 PROCEDURE — 99213 OFFICE O/P EST LOW 20 MIN: CPT

## 2020-03-16 RX ORDER — PREDNISONE 10 MG/1
10 TABLET ORAL DAILY
Qty: 5 TABLET | Refills: 0 | Status: SHIPPED | OUTPATIENT
Start: 2020-03-16 | End: 2020-03-21

## 2020-03-16 ASSESSMENT — ENCOUNTER SYMPTOMS
SHORTNESS OF BREATH: 0
STRIDOR: 0
DIARRHEA: 0
APNEA: 0
CHOKING: 0
CHEST TIGHTNESS: 0
RHINORRHEA: 1
COUGH: 1
ABDOMINAL PAIN: 0
SORE THROAT: 0
WHEEZING: 0
NAUSEA: 0
VOMITING: 0

## 2020-03-16 ASSESSMENT — PAIN DESCRIPTION - LOCATION: LOCATION: GENERALIZED

## 2020-03-16 ASSESSMENT — PAIN DESCRIPTION - ONSET: ONSET: GRADUAL

## 2020-03-16 ASSESSMENT — PAIN - FUNCTIONAL ASSESSMENT: PAIN_FUNCTIONAL_ASSESSMENT: ACTIVITIES ARE NOT PREVENTED

## 2020-03-16 ASSESSMENT — PAIN DESCRIPTION - PAIN TYPE: TYPE: ACUTE PAIN

## 2020-03-16 ASSESSMENT — PAIN DESCRIPTION - PROGRESSION: CLINICAL_PROGRESSION: GRADUALLY WORSENING

## 2020-03-16 ASSESSMENT — PAIN SCALES - GENERAL: PAINLEVEL_OUTOF10: 3

## 2020-03-16 ASSESSMENT — PAIN DESCRIPTION - DESCRIPTORS: DESCRIPTORS: ACHING

## 2020-03-16 ASSESSMENT — PAIN DESCRIPTION - FREQUENCY: FREQUENCY: CONTINUOUS

## 2020-05-03 ENCOUNTER — PATIENT MESSAGE (OUTPATIENT)
Dept: FAMILY MEDICINE CLINIC | Age: 55
End: 2020-05-03

## 2020-05-04 RX ORDER — LISINOPRIL 10 MG/1
10 TABLET ORAL DAILY
Qty: 90 TABLET | Refills: 3 | Status: SHIPPED | OUTPATIENT
Start: 2020-05-04 | End: 2021-04-14

## 2020-05-04 RX ORDER — FLUOXETINE HYDROCHLORIDE 40 MG/1
40 CAPSULE ORAL DAILY
Qty: 90 CAPSULE | Refills: 3 | Status: SHIPPED | OUTPATIENT
Start: 2020-05-04 | End: 2020-12-14 | Stop reason: SDUPTHER

## 2020-05-04 RX ORDER — PEN NEEDLE, DIABETIC 31 G X1/4"
1 NEEDLE, DISPOSABLE MISCELLANEOUS
Qty: 100 EACH | Refills: 0 | Status: SHIPPED | OUTPATIENT
Start: 2020-05-04 | End: 2020-07-22 | Stop reason: SDUPTHER

## 2020-05-26 RX ORDER — OMEPRAZOLE 40 MG/1
40 CAPSULE, DELAYED RELEASE ORAL DAILY
Qty: 90 CAPSULE | Refills: 1 | Status: SHIPPED | OUTPATIENT
Start: 2020-05-26 | End: 2020-12-14 | Stop reason: SDUPTHER

## 2020-06-17 ENCOUNTER — OFFICE VISIT (OUTPATIENT)
Dept: CARDIOLOGY CLINIC | Age: 55
End: 2020-06-17
Payer: COMMERCIAL

## 2020-06-17 VITALS
DIASTOLIC BLOOD PRESSURE: 85 MMHG | SYSTOLIC BLOOD PRESSURE: 135 MMHG | HEIGHT: 67 IN | HEART RATE: 55 BPM | WEIGHT: 205.6 LBS | BODY MASS INDEX: 32.27 KG/M2

## 2020-06-17 PROCEDURE — 93000 ELECTROCARDIOGRAM COMPLETE: CPT | Performed by: NUCLEAR MEDICINE

## 2020-06-17 PROCEDURE — 99204 OFFICE O/P NEW MOD 45 MIN: CPT | Performed by: NUCLEAR MEDICINE

## 2020-06-17 PROCEDURE — 3017F COLORECTAL CA SCREEN DOC REV: CPT | Performed by: NUCLEAR MEDICINE

## 2020-06-17 PROCEDURE — G8427 DOCREV CUR MEDS BY ELIG CLIN: HCPCS | Performed by: NUCLEAR MEDICINE

## 2020-06-17 PROCEDURE — G8417 CALC BMI ABV UP PARAM F/U: HCPCS | Performed by: NUCLEAR MEDICINE

## 2020-06-17 PROCEDURE — 1036F TOBACCO NON-USER: CPT | Performed by: NUCLEAR MEDICINE

## 2020-06-17 RX ORDER — M-VIT,TX,IRON,MINS/CALC/FOLIC 27MG-0.4MG
1 TABLET ORAL DAILY
COMMUNITY
End: 2021-12-20 | Stop reason: ALTCHOICE

## 2020-06-17 RX ORDER — LANOLIN ALCOHOL/MO/W.PET/CERES
1000 CREAM (GRAM) TOPICAL DAILY
COMMUNITY
End: 2020-12-14

## 2020-06-17 ASSESSMENT — ENCOUNTER SYMPTOMS
RECTAL PAIN: 0
BACK PAIN: 0
DIARRHEA: 0
VOMITING: 0
SHORTNESS OF BREATH: 1
ABDOMINAL PAIN: 0
BLOOD IN STOOL: 0
CHEST TIGHTNESS: 1
ANAL BLEEDING: 0
NAUSEA: 0
CONSTIPATION: 0
ABDOMINAL DISTENTION: 0
PHOTOPHOBIA: 0
FACIAL SWELLING: 0

## 2020-06-17 NOTE — PROGRESS NOTES
[address] [id number]  Dept: [phone]  Dept Fax: [phone]  Loc: [phone]    Visit Date: 2020    Arnaud Purdy is a 62 y.o. male who presents todayfor:  Chief Complaint   Patient presents with    New Patient    Establish Cardiologist    Check-Up    Shortness of Breath    Diabetes    Hypertension   here for the first time  Was working in the yard  Had chest pain   Right side   Lasted few seconds  Went to the jaw  No other triggers  Was during exertion  Did go a bit to the elbow  Some dyspnea on exertion   Cath    Mild CAD  Does have Dm for many years   Fair control   Does have HTn on medical RX  Does have hyperlipidemia  On statins   No smoking   Family history of CAD        HPI:  HPI  Past Medical History:   Diagnosis Date    Depression with anxiety     Diabetes mellitus ([address]     GERD (gastroesophageal reflux disease)     Hyperlipidemia     Hypertension       Past Surgical History:   Procedure Laterality Date    BREAST SURGERY      fatty tissue left breat benign    CARDIAC CATHETERIZATION      [facility]    SHOULDER SURGERY  2017    TONSILLECTOMY  1973    addenoidectomy     History reviewed. No pertinent family history.   Social History     Tobacco Use    Smoking status: Former Smoker     Packs/day: 1.00     Years: 25.00     Pack years: 25.00     Types: Cigarettes     Start date: 1980     Last attempt to quit: 2005     Years since quittin.9    Smokeless tobacco: Never Used   Substance Use Topics    Alcohol use: Yes     Comment: socially wine      Current Outpatient Medications   Medication Sig Dispense Refill    Multiple Vitamins-Minerals (THERAPEUTIC MULTIVITAMIN-MINERALS) tablet Take 1 tablet by mouth daily      vitamin B-12 (CYANOCOBALAMIN) 1000 MCG tablet Take 1,000 mcg by mouth daily      dapagliflozin (FARXIGA) 10 MG tablet Take 1 tablet by mouth every and postnasal drip. Eyes: Negative for photophobia. Respiratory: Positive for chest tightness and shortness of breath. Cardiovascular: Negative for chest pain and palpitations. Gastrointestinal: Negative for abdominal distention, abdominal pain, anal bleeding, blood in stool, constipation, diarrhea, nausea, rectal pain and vomiting. Endocrine: Negative for polyphagia. Genitourinary: Negative for dysuria, frequency and urgency. Musculoskeletal: Negative for arthralgias, back pain, gait problem, joint swelling, myalgias, neck pain and neck stiffness. Allergic/Immunologic: Negative for food allergies. Neurological: Negative for dizziness, syncope and light-headedness. Psychiatric/Behavioral: Negative for behavioral problems, confusion, decreased concentration, dysphoric mood and hallucinations. The patient is not nervous/anxious and is not hyperactive. Objective:  Physical Exam  HENT:      Head: Normocephalic. Nose: Nose normal.   Eyes:      Extraocular Movements: Extraocular movements intact. Pupils: Pupils are equal, round, and reactive to light. Neck:      Musculoskeletal: Normal range of motion. Cardiovascular:      Rate and Rhythm: Normal rate and regular rhythm. Heart sounds: Murmur present. Pulmonary:      Effort: No respiratory distress. Breath sounds: No stridor. No wheezing, rhonchi or rales. Chest:      Chest wall: No tenderness. Abdominal:      General: There is no distension. Palpations: There is no mass. Tenderness: There is no abdominal tenderness. There is no left CVA tenderness, guarding or rebound. Hernia: No hernia is present. Musculoskeletal:         General: No swelling, tenderness, deformity or signs of injury. Right lower leg: No edema. Left lower leg: No edema. Skin:     Coloration: Skin is not jaundiced or pale. Findings: No bruising, erythema, lesion or rash.    Neurological:      Mental Status: He is

## 2020-06-29 ENCOUNTER — APPOINTMENT (OUTPATIENT)
Dept: CT IMAGING | Age: 55
End: 2020-06-29
Payer: COMMERCIAL

## 2020-06-29 ENCOUNTER — APPOINTMENT (OUTPATIENT)
Dept: GENERAL RADIOLOGY | Age: 55
End: 2020-06-29
Payer: COMMERCIAL

## 2020-06-29 ENCOUNTER — HOSPITAL ENCOUNTER (EMERGENCY)
Age: 55
Discharge: HOME | End: 2020-06-29
Payer: COMMERCIAL

## 2020-06-29 VITALS
HEIGHT: 67 IN | BODY MASS INDEX: 32.18 KG/M2 | SYSTOLIC BLOOD PRESSURE: 127 MMHG | RESPIRATION RATE: 16 BRPM | TEMPERATURE: 99.4 F | DIASTOLIC BLOOD PRESSURE: 67 MMHG | WEIGHT: 205 LBS | OXYGEN SATURATION: 96 % | HEART RATE: 52 BPM

## 2020-06-29 LAB
ALBUMIN SERPL-MCNC: 4.3 G/DL (ref 3.5–5.1)
ALP BLD-CCNC: 82 U/L (ref 38–126)
ALT SERPL-CCNC: 35 U/L (ref 11–66)
ANION GAP SERPL CALCULATED.3IONS-SCNC: 11 MEQ/L (ref 8–16)
AST SERPL-CCNC: 15 U/L (ref 5–40)
BASOPHILS # BLD: 0.1 %
BASOPHILS ABSOLUTE: 0 THOU/MM3 (ref 0–0.1)
BILIRUB SERPL-MCNC: 0.2 MG/DL (ref 0.3–1.2)
BUN BLDV-MCNC: 16 MG/DL (ref 7–22)
CALCIUM SERPL-MCNC: 9.6 MG/DL (ref 8.5–10.5)
CHLORIDE BLD-SCNC: 105 MEQ/L (ref 98–111)
CO2: 22 MEQ/L (ref 23–33)
CREAT SERPL-MCNC: 0.8 MG/DL (ref 0.4–1.2)
EKG ATRIAL RATE: 56 BPM
EKG P-R INTERVAL: 132 MS
EKG Q-T INTERVAL: 404 MS
EKG QRS DURATION: 90 MS
EKG QTC CALCULATION (BAZETT): 389 MS
EKG R AXIS: 6 DEGREES
EKG T AXIS: 14 DEGREES
EKG VENTRICULAR RATE: 56 BPM
EOSINOPHIL # BLD: 0.7 %
EOSINOPHILS ABSOLUTE: 0.1 THOU/MM3 (ref 0–0.4)
ERYTHROCYTE [DISTWIDTH] IN BLOOD BY AUTOMATED COUNT: 13.6 % (ref 11.5–14.5)
ERYTHROCYTE [DISTWIDTH] IN BLOOD BY AUTOMATED COUNT: 49 FL (ref 35–45)
GFR SERPL CREATININE-BSD FRML MDRD: > 90 ML/MIN/1.73M2
GLUCOSE BLD-MCNC: 133 MG/DL (ref 70–108)
GLUCOSE BLD-MCNC: 150 MG/DL (ref 70–108)
HCT VFR BLD CALC: 48.5 % (ref 42–52)
HEMOGLOBIN: 15.5 GM/DL (ref 14–18)
IMMATURE GRANS (ABS): 0.06 THOU/MM3 (ref 0–0.07)
IMMATURE GRANULOCYTES: 0.4 %
LIPASE: 30.6 U/L (ref 5.6–51.3)
LYMPHOCYTES # BLD: 8.1 %
LYMPHOCYTES ABSOLUTE: 1.2 THOU/MM3 (ref 1–4.8)
MCH RBC QN AUTO: 31.1 PG (ref 26–33)
MCHC RBC AUTO-ENTMCNC: 32 GM/DL (ref 32.2–35.5)
MCV RBC AUTO: 97.2 FL (ref 80–94)
MONOCYTES # BLD: 8.2 %
MONOCYTES ABSOLUTE: 1.2 THOU/MM3 (ref 0.4–1.3)
NUCLEATED RED BLOOD CELLS: 0 /100 WBC
OSMOLALITY CALCULATION: 279.7 MOSMOL/KG (ref 275–300)
PLATELET # BLD: 274 THOU/MM3 (ref 130–400)
PMV BLD AUTO: 9.8 FL (ref 9.4–12.4)
POTASSIUM REFLEX MAGNESIUM: 4.1 MEQ/L (ref 3.5–5.2)
RBC # BLD: 4.99 MILL/MM3 (ref 4.7–6.1)
SEG NEUTROPHILS: 82.5 %
SEGMENTED NEUTROPHILS ABSOLUTE COUNT: 12 THOU/MM3 (ref 1.8–7.7)
SODIUM BLD-SCNC: 138 MEQ/L (ref 135–145)
TOTAL PROTEIN: 6.8 G/DL (ref 6.1–8)
TROPONIN T: < 0.01 NG/ML
WBC # BLD: 14.6 THOU/MM3 (ref 4.8–10.8)

## 2020-06-29 PROCEDURE — 96374 THER/PROPH/DIAG INJ IV PUSH: CPT

## 2020-06-29 PROCEDURE — 36415 COLL VENOUS BLD VENIPUNCTURE: CPT

## 2020-06-29 PROCEDURE — 82948 REAGENT STRIP/BLOOD GLUCOSE: CPT

## 2020-06-29 PROCEDURE — 2580000003 HC RX 258: Performed by: PHYSICIAN ASSISTANT

## 2020-06-29 PROCEDURE — 85025 COMPLETE CBC W/AUTO DIFF WBC: CPT

## 2020-06-29 PROCEDURE — 6370000000 HC RX 637 (ALT 250 FOR IP): Performed by: PHYSICIAN ASSISTANT

## 2020-06-29 PROCEDURE — 71045 X-RAY EXAM CHEST 1 VIEW: CPT

## 2020-06-29 PROCEDURE — 93005 ELECTROCARDIOGRAM TRACING: CPT | Performed by: PHYSICIAN ASSISTANT

## 2020-06-29 PROCEDURE — 80053 COMPREHEN METABOLIC PANEL: CPT

## 2020-06-29 PROCEDURE — 84484 ASSAY OF TROPONIN QUANT: CPT

## 2020-06-29 PROCEDURE — 99285 EMERGENCY DEPT VISIT HI MDM: CPT

## 2020-06-29 PROCEDURE — 83690 ASSAY OF LIPASE: CPT

## 2020-06-29 PROCEDURE — 6360000002 HC RX W HCPCS: Performed by: PHYSICIAN ASSISTANT

## 2020-06-29 PROCEDURE — 74176 CT ABD & PELVIS W/O CONTRAST: CPT

## 2020-06-29 RX ORDER — 0.9 % SODIUM CHLORIDE 0.9 %
1000 INTRAVENOUS SOLUTION INTRAVENOUS ONCE
Status: COMPLETED | OUTPATIENT
Start: 2020-06-29 | End: 2020-06-29

## 2020-06-29 RX ORDER — ONDANSETRON 2 MG/ML
4 INJECTION INTRAMUSCULAR; INTRAVENOUS ONCE
Status: COMPLETED | OUTPATIENT
Start: 2020-06-29 | End: 2020-06-29

## 2020-06-29 RX ORDER — SUCRALFATE 1 G/1
1 TABLET ORAL ONCE
Status: COMPLETED | OUTPATIENT
Start: 2020-06-29 | End: 2020-06-29

## 2020-06-29 RX ADMIN — SODIUM CHLORIDE 1000 ML: 9 INJECTION, SOLUTION INTRAVENOUS at 21:37

## 2020-06-29 RX ADMIN — SUCRALFATE 1 G: 1 TABLET ORAL at 21:37

## 2020-06-29 RX ADMIN — ONDANSETRON 4 MG: 2 INJECTION INTRAMUSCULAR; INTRAVENOUS at 21:37

## 2020-06-29 ASSESSMENT — PAIN DESCRIPTION - PAIN TYPE
TYPE: ACUTE PAIN

## 2020-06-29 ASSESSMENT — ENCOUNTER SYMPTOMS
COUGH: 0
DIARRHEA: 1
WHEEZING: 0
CONSTIPATION: 0
NAUSEA: 1
VOMITING: 1
CHEST TIGHTNESS: 0
BLOOD IN STOOL: 0
EYES NEGATIVE: 1
ABDOMINAL DISTENTION: 0
ABDOMINAL PAIN: 1
SHORTNESS OF BREATH: 0
BACK PAIN: 0
ANAL BLEEDING: 0

## 2020-06-29 ASSESSMENT — PAIN DESCRIPTION - LOCATION
LOCATION: ABDOMEN

## 2020-06-29 ASSESSMENT — PAIN SCALES - GENERAL
PAINLEVEL_OUTOF10: 3
PAINLEVEL_OUTOF10: 2
PAINLEVEL_OUTOF10: 3

## 2020-06-29 ASSESSMENT — PAIN DESCRIPTION - ORIENTATION
ORIENTATION: MID;UPPER
ORIENTATION: RIGHT;MID

## 2020-06-29 ASSESSMENT — PAIN DESCRIPTION - DESCRIPTORS
DESCRIPTORS: ACHING
DESCRIPTORS: ACHING

## 2020-06-29 ASSESSMENT — PAIN DESCRIPTION - FREQUENCY
FREQUENCY: CONTINUOUS
FREQUENCY: CONTINUOUS

## 2020-06-29 NOTE — ED TRIAGE NOTES
Pt presents to ER with vomiting and diarrhea that started this morning at 0600. Pt reports vomiting 4 times today and several episodes of diarrhea. Denies seeing blood. Complains of mid epigastric abdominal pain rated 3 out of 10.

## 2020-06-30 ENCOUNTER — CARE COORDINATION (OUTPATIENT)
Dept: CARE COORDINATION | Age: 55
End: 2020-06-30

## 2020-06-30 PROCEDURE — 93010 ELECTROCARDIOGRAM REPORT: CPT | Performed by: NUCLEAR MEDICINE

## 2020-06-30 NOTE — ED NOTES
Pt resting on cot in position of comfort. Pt remains A&Ox4, resps easy and unlabored. IV shows no s/s of infection or infiltration. States pain has improved 2/10. Updated pt on POC. Will continue to monitor.      Fabiola Benton, DURAN  06/29/20 9438

## 2020-06-30 NOTE — ED PROVIDER NOTES
[address] [facility] [address] [address] EMERGENCY DEPT    EMERGENCY MEDICINE     Pt Name: Stan Paige  MRN: [id number]  [place] 1965  Date of evaluation: 6/29/2020  Provider: Bharat Stahl [facility]       Chief Complaint   Patient presents with    Emesis    Diarrhea       HISTORY OF PRESENT ILLNESS    Stan Paige is a 62 y.o. male who presents to the emergency department from home with a chief complaint of nausea/vomiting/diarrhea since this morning. Patient states that his symptoms started this morning approximately 6 AM where he woke up with nausea and an episode of vomiting. Patient reports having 4 episodes of emesis since symptom onset today. He also reports 2-3 episodes of diarrhea. States that his vomitus was yellow in color, denying any blood. Patient also denies any blood in stool. Patient localizes abdominal pain to his epigastric region with no radiation. Patient reports his pain is a 3 out of 10 in severity and declines wanting any pain medicine at this time. Patient has not taken any medications for his symptoms. Denies any recent illness, fever, HA, sore throat, cough, CP, SOB, back pain, dysuria, flank pain, or any other symptoms at this time. Triage notes and Nursing notes were reviewed by myself. Any discrepancies are addressed above.     PAST MEDICAL HISTORY     Past Medical History:   Diagnosis Date    Depression with anxiety     Diabetes mellitus ([address]     GERD (gastroesophageal reflux disease)     Hyperlipidemia     Hypertension        SURGICAL HISTORY       Past Surgical History:   Procedure Laterality Date    BREAST SURGERY  1996    fatty tissue left breat benign    [facility]    SHOULDER SURGERY  2017    TONSILLECTOMY  1973    addenoidectomy       CURRENT MEDICATIONS       Previous Medications    ATORVASTATIN (LIPITOR) 40 MG TABLET    Take 1 tablet by mouth daily    DAPAGLIFLOZIN (FARXIGA) 10 MG TABLET    Take 1 tablet by mouth every morning FLUOXETINE (PROZAC) 40 MG CAPSULE    Take 1 capsule by mouth daily    INSULIN LISPRO (HUMALOG KWIKPEN) 100 UNIT/ML PEN    Inject 35-45 Units into the skin 3 times daily (before meals)    INSULIN PEN NEEDLE (PEN NEEDLES) 31G X 6 MM MISC    1 each by Does not apply route 5 times daily    LANTUS SOLOSTAR 100 UNIT/ML INJECTION PEN    Inject 100 Units into the skin nightly    LISINOPRIL (PRINIVIL;ZESTRIL) 10 MG TABLET    Take 1 tablet by mouth daily    MULTIPLE VITAMINS-MINERALS (THERAPEUTIC MULTIVITAMIN-MINERALS) TABLET    Take 1 tablet by mouth daily    OMEPRAZOLE (PRILOSEC) 40 MG DELAYED RELEASE CAPSULE    Take 1 capsule by mouth daily    SITAGLIPTIN (JANUVIA) 100 MG TABLET    Take 1 tablet by mouth daily    VITAMIN B-12 (CYANOCOBALAMIN) 1000 MCG TABLET    Take 1,000 mcg by mouth daily       ALLERGIES     Penicillins; Lipitor; and Contrast [gadolinium derivatives]    FAMILY HISTORY     History reviewed. No pertinent family history.      SOCIAL HISTORY       Social History     Socioeconomic History    Marital status:      Spouse name: None    Number of children: None    Years of education: None    Highest education level: None   Occupational History    None   Social Needs    Financial resource strain: None    Food insecurity     Worry: None     Inability: None    Transportation needs     Medical: None     Non-medical: None   Tobacco Use    Smoking status: Former Smoker     Packs/day: 1.00     Years: 25.00     Pack years: 25.00     Types: Cigarettes     Start date: 1980     Last attempt to quit: 2005     Years since quittin.9    Smokeless tobacco: Never Used   Substance and Sexual Activity    Alcohol use: Yes     Comment: socially wine    Drug use: Never    Sexual activity: None   Lifestyle    Physical activity     Days per week: None     Minutes per session: None    Stress: None   Relationships    Social connections     Talks on phone: None     Gets together: None     Attends moist.   Eyes:      Extraocular Movements: Extraocular movements intact. Pupils: Pupils are equal, round, and reactive to light. Neck:      Musculoskeletal: Normal range of motion. Cardiovascular:      Rate and Rhythm: Normal rate and regular rhythm. Pulses: Normal pulses. Heart sounds: Normal heart sounds. No murmur. No friction rub. No gallop. Pulmonary:      Effort: Pulmonary effort is normal. No respiratory distress. Breath sounds: Normal breath sounds. No wheezing, rhonchi or rales. Abdominal:      General: Abdomen is flat. Bowel sounds are normal. There is no distension. Tenderness: There is abdominal tenderness (TTP of epigastric region) in the epigastric area. There is no right CVA tenderness, left CVA tenderness, guarding or rebound. Negative signs include Scanlon's sign, Rovsing's sign and McBurney's sign. Skin:     General: Skin is warm and dry. Coloration: Skin is not jaundiced. Findings: No bruising or rash. Neurological:      General: No focal deficit present. Mental Status: He is alert and oriented to person, place, and time. Sensory: No sensory deficit. Motor: No weakness. Psychiatric:         Mood and Affect: Mood normal.         Behavior: Behavior normal.         DIAGNOSTIC RESULTS     EKG:(none if blank)  All EKG's are interpreted by [facility] Phys[facility] who either signs or Co-signs this chart in the absence of a cardiologist.  See EHR. No STEMI. RADIOLOGY: (none if blank)   Interpretation per the Radiologistbelow, if available at the time of this note:    CT ABDOMEN PELVIS WO CONTRAST Additional Contrast? None   Final Result   1.. Negative exam for acute pathology of the abdomen and pelvis. 2. Bilateral renal cystic changes         **This report has been created using voice recognition software. It may contain minor errors which are inherent in voice recognition technology. **      Final report electronically signed

## 2020-06-30 NOTE — ED NOTES
Updated pt on delays of care. Pt denies any further needs at this time. Will monitor.      Lois Bone RN  06/29/20 2040

## 2020-06-30 NOTE — CARE COORDINATION
I left a message asking the patient to please call me back for a Covid Precaution call.         Tessie Chadwick [facility]  [address] [facility]   Medication Assistance  [facility] and [employer]    (D)[phone]  (h)[phone]

## 2020-07-01 NOTE — CARE COORDINATION
Second attempt in trying to reach the patient. If I don't hear back from the patient today I will close the encounter and end the episode.

## 2020-07-08 ENCOUNTER — HOSPITAL ENCOUNTER (OUTPATIENT)
Dept: NON INVASIVE DIAGNOSTICS | Age: 55
Discharge: HOME | End: 2020-07-08
Payer: COMMERCIAL

## 2020-07-08 VITALS — BODY MASS INDEX: 32.18 KG/M2 | WEIGHT: 205 LBS | HEIGHT: 67 IN

## 2020-07-08 LAB
LV EF: 55 %
LV EF: 61 %
LVEF MODALITY: NORMAL
LVEF MODALITY: NORMAL

## 2020-07-08 PROCEDURE — 3430000000 HC RX DIAGNOSTIC RADIOPHARMACEUTICAL: Performed by: NUCLEAR MEDICINE

## 2020-07-08 PROCEDURE — 78452 HT MUSCLE IMAGE SPECT MULT: CPT

## 2020-07-08 PROCEDURE — 93017 CV STRESS TEST TRACING ONLY: CPT | Performed by: NUCLEAR MEDICINE

## 2020-07-08 PROCEDURE — A9500 TC99M SESTAMIBI: HCPCS | Performed by: NUCLEAR MEDICINE

## 2020-07-08 PROCEDURE — 93306 TTE W/DOPPLER COMPLETE: CPT

## 2020-07-08 RX ADMIN — Medication 33.1 MILLICURIE: at 10:50

## 2020-07-08 RX ADMIN — Medication 10.7 MILLICURIE: at 09:32

## 2020-07-22 RX ORDER — PEN NEEDLE, DIABETIC 31 G X1/4"
1 NEEDLE, DISPOSABLE MISCELLANEOUS
Qty: 100 EACH | Refills: 2 | Status: SHIPPED | OUTPATIENT
Start: 2020-07-22 | End: 2020-12-14 | Stop reason: SDUPTHER

## 2020-10-26 RX ORDER — ATORVASTATIN CALCIUM 40 MG/1
TABLET, FILM COATED ORAL
Qty: 90 TABLET | Refills: 0 | Status: SHIPPED | OUTPATIENT
Start: 2020-10-26 | End: 2021-01-12

## 2020-10-26 RX ORDER — SITAGLIPTIN 100 MG/1
TABLET, FILM COATED ORAL
Qty: 90 TABLET | Refills: 0 | Status: SHIPPED | OUTPATIENT
Start: 2020-10-26 | End: 2020-12-14 | Stop reason: SDUPTHER

## 2020-11-13 RX ORDER — PEN NEEDLE, DIABETIC 29 G X1/2"
NEEDLE, DISPOSABLE MISCELLANEOUS
Qty: 100 EACH | Refills: 2 | OUTPATIENT
Start: 2020-11-13

## 2020-11-13 RX ORDER — DAPAGLIFLOZIN 10 MG/1
TABLET, FILM COATED ORAL
Qty: 90 TABLET | Refills: 1 | OUTPATIENT
Start: 2020-11-13

## 2020-11-18 LAB
HCT VFR BLD CALC: NORMAL %
HEMOGLOBIN: NORMAL
PLATELET # BLD: NORMAL 10*3/UL
WBC # BLD: NORMAL 10*3/UL

## 2020-11-19 RX ORDER — OMEPRAZOLE 40 MG/1
CAPSULE, DELAYED RELEASE ORAL
Qty: 90 CAPSULE | Refills: 1 | OUTPATIENT
Start: 2020-11-19

## 2020-11-19 RX ORDER — PEN NEEDLE, DIABETIC 29 G X1/2"
NEEDLE, DISPOSABLE MISCELLANEOUS
Qty: 100 EACH | Refills: 2 | OUTPATIENT
Start: 2020-11-19

## 2020-11-30 RX ORDER — DAPAGLIFLOZIN 10 MG/1
TABLET, FILM COATED ORAL
Qty: 90 TABLET | Refills: 1 | OUTPATIENT
Start: 2020-11-30

## 2020-12-14 ENCOUNTER — OFFICE VISIT (OUTPATIENT)
Dept: FAMILY MEDICINE CLINIC | Age: 55
End: 2020-12-14
Payer: COMMERCIAL

## 2020-12-14 VITALS
SYSTOLIC BLOOD PRESSURE: 110 MMHG | TEMPERATURE: 97.1 F | HEART RATE: 68 BPM | WEIGHT: 209 LBS | RESPIRATION RATE: 20 BRPM | DIASTOLIC BLOOD PRESSURE: 60 MMHG | BODY MASS INDEX: 32.8 KG/M2 | HEIGHT: 67 IN

## 2020-12-14 PROCEDURE — 3017F COLORECTAL CA SCREEN DOC REV: CPT | Performed by: NURSE PRACTITIONER

## 2020-12-14 PROCEDURE — G8427 DOCREV CUR MEDS BY ELIG CLIN: HCPCS | Performed by: NURSE PRACTITIONER

## 2020-12-14 PROCEDURE — 3046F HEMOGLOBIN A1C LEVEL >9.0%: CPT | Performed by: NURSE PRACTITIONER

## 2020-12-14 PROCEDURE — G8417 CALC BMI ABV UP PARAM F/U: HCPCS | Performed by: NURSE PRACTITIONER

## 2020-12-14 PROCEDURE — 2022F DILAT RTA XM EVC RTNOPTHY: CPT | Performed by: NURSE PRACTITIONER

## 2020-12-14 PROCEDURE — 99214 OFFICE O/P EST MOD 30 MIN: CPT | Performed by: NURSE PRACTITIONER

## 2020-12-14 PROCEDURE — 1036F TOBACCO NON-USER: CPT | Performed by: NURSE PRACTITIONER

## 2020-12-14 PROCEDURE — G8484 FLU IMMUNIZE NO ADMIN: HCPCS | Performed by: NURSE PRACTITIONER

## 2020-12-14 RX ORDER — PEN NEEDLE, DIABETIC 29 G X1/2"
NEEDLE, DISPOSABLE MISCELLANEOUS
Qty: 100 EACH | Refills: 2 | OUTPATIENT
Start: 2020-12-14

## 2020-12-14 RX ORDER — FLUOXETINE HYDROCHLORIDE 20 MG/1
20 CAPSULE ORAL DAILY
Qty: 90 CAPSULE | Refills: 3 | Status: SHIPPED | OUTPATIENT
Start: 2020-12-14 | End: 2021-12-16

## 2020-12-14 RX ORDER — PEN NEEDLE, DIABETIC 31 G X1/4"
1 NEEDLE, DISPOSABLE MISCELLANEOUS 3 TIMES DAILY
Qty: 100 EACH | Refills: 11 | Status: SHIPPED | OUTPATIENT
Start: 2020-12-14 | End: 2022-01-25 | Stop reason: SDUPTHER

## 2020-12-14 RX ORDER — FLUOXETINE HYDROCHLORIDE 40 MG/1
40 CAPSULE ORAL DAILY
Qty: 90 CAPSULE | Refills: 3 | Status: SHIPPED | OUTPATIENT
Start: 2020-12-14 | End: 2021-10-25

## 2020-12-14 RX ORDER — OMEPRAZOLE 40 MG/1
40 CAPSULE, DELAYED RELEASE ORAL DAILY
Qty: 90 CAPSULE | Refills: 3 | Status: SHIPPED | OUTPATIENT
Start: 2020-12-14 | End: 2021-12-16

## 2020-12-14 RX ORDER — INSULIN GLARGINE 100 [IU]/ML
120 INJECTION, SOLUTION SUBCUTANEOUS NIGHTLY
Qty: 10 PEN | Refills: 11 | Status: SHIPPED | OUTPATIENT
Start: 2020-12-14 | End: 2021-12-20 | Stop reason: SDUPTHER

## 2020-12-14 NOTE — TELEPHONE ENCOUNTER
ILIANA 100 MG tablet    dapagliflozin (FARXIGA) 10 MG tablet     dapagliflozin (FARXIGA) 10 MG tablet     [facility]

## 2020-12-15 NOTE — PROGRESS NOTES
[address] [place] [facility] [address]  Dept: [phone]  Dept Fax: [phone]  Loc: [phone]  PROGRESS NOTE      VisitDate: 12/14/2020    Carrie Ma is a 60 y.o. male who presents today for:     Chief Complaint   Patient presents with    Check-Up     DM, HTN, Hyperlipidemia, Gerd, Depression, Anxiety,   BS .  Labs Only     had cbc and bmp in November before right arm surgery. No A1c    Medication Refill    Discuss Medications     prozac    Other     called [facility] for bmp, cbc and Post op note         Subjective:  Routine checkup/refills. History of depression/anxiety, type 2 diabetes, GERD, hyperlipidemia, hypertension. Patient requesting possible increase Prozac reports increased depressive mood. Denies any homicidal or suicidal ideations. Denies any chest pain, headaches, dizziness, syncope, shortness of breath, abdominal pain or edema. Reports regular bowel movements no blood or melena. Denies any dysuria. Reports minimal nocturia. Patient status post recent right shoulder surgery. Review of Systems   Psychiatric/Behavioral: Positive for decreased concentration and dysphoric mood. All other systems reviewed and are negative. Past Medical History:   Diagnosis Date    Depression with anxiety     Diabetes mellitus ([address]     GERD (gastroesophageal reflux disease)     Hyperlipidemia     Hypertension       Past Surgical History:   Procedure Laterality Date    BREAST SURGERY  1996    fatty tissue left breat benign    CARD[facility]  4-[facility]    SHOULDER SURGERY  2017    SHOULDER SURGERY  11/30/2020        Right shoulder arthroscopy cam procedure extensive debridement, BT, CTR, ulnar nerve decompression                      TONSILLECTOMY  1973    addenoidectomy     History reviewed. No pertinent family history.   Social History     Tobacco Use  Smoking status: Former Smoker     Packs/day: 1.00     Years: 25.00     Pack years: 25.00     Types: Cigarettes     Start date: 11/13/1980     Quit date: 7/4/2005     Years since quitting: 15.4    Smokeless tobacco: Never Used   Substance Use Topics    Alcohol use: Yes     Comment: socially wine      Current Outpatient Medications   Medication Sig Dispense Refill    Insulin Pen Needle (PEN NEEDLES) 31G X 6 MM MISC 1 each by Does not apply route 3 times daily E11.9 Z79.4 100 each 11    dapagliflozin (FARXIGA) 10 MG tablet Take 1 tablet by mouth every morning 90 tablet 3    omeprazole (PRILOSEC) 40 MG delayed release capsule Take 1 capsule by mouth daily 90 capsule 3    SITagliptin (JANUVIA) 100 MG tablet take 1 tablet by mouth once daily 90 tablet 3    LANTUS SOLOSTAR 100 UNIT/ML injection pen Inject 120 Units into the skin nightly 10 pen 11    FLUoxetine (PROZAC) 20 MG capsule Take 1 capsule by mouth daily 90 capsule 3    FLUoxetine (PROZAC) 40 MG capsule Take 1 capsule by mouth daily 90 capsule 3    atorvastatin (LIPITOR) 40 MG tablet take 1 tablet by mouth once daily (Patient taking differently: Takes 40mg three times per week) 90 tablet 0    Multiple Vitamins-Minerals (THERAPEUTIC MULTIVITAMIN-MINERALS) tablet Take 1 tablet by mouth daily      lisinopril (PRINIVIL;ZESTRIL) 10 MG tablet Take 1 tablet by mouth daily 90 tablet 3    insulin lispro (HUMALOG KWIKPEN) 100 UNIT/ML pen Inject 35-45 Units into the skin 3 times daily (before meals) (Patient taking differently: Inject 5-10 Units into the skin 3 times daily (before meals) ) 10 pen 0     No current facility-administered medications for this visit.       Allergies   Allergen Reactions    Penicillins Anaphylaxis    Crestor [Rosuvastatin] Itching    Contrast [Gadolinium Derivatives] Rash     Red Man Syndrome     Health Maintenance   Topic Date Due    Hepatitis C screen  1965    HIV screen  10/01/1978  Diabetic microalbuminuria test  10/01/1981    Hepatitis B vaccine (1 of 3 - Risk 3-dose series) 10/01/1982    DTaP/Tdap/Td vaccine (1 - Tdap) 10/01/1982    Shingles Vaccine (1 of 2) 10/01/2013    Pneumococcal 0-64 years Vaccine (1 of 1 - PPSV23) 11/27/2019    Flu vaccine (1) 09/01/2020    Diabetic foot exam  10/02/2020    A1C test (Diabetic or Prediabetic)  10/02/2020    Lipid screen  10/02/2020    Diabetic retinal exam  01/08/2021    Potassium monitoring  06/29/2021    Creatinine monitoring  06/29/2021    Colon cancer screen colonoscopy  05/10/2029    Hepatitis A vaccine  Aged Out    Hib vaccine  Aged Out    Meningococcal (ACWY) vaccine  Aged Out         Objective:     Physical Exam  Vitals signs and nursing note reviewed. Constitutional:       Appearance: Normal appearance. He is well-developed. He is not diaphoretic. HENT:      Head: Normocephalic and atraumatic. Not macrocephalic and not microcephalic. Right Ear: Hearing, tympanic membrane, ear canal and external ear normal. No drainage or tenderness. No middle ear effusion. No hemotympanum. Tympanic membrane is not injected, scarred, perforated or bulging. Left Ear: Hearing, tympanic membrane, ear canal and external ear normal. No drainage or tenderness. No middle ear effusion. No hemotympanum. Tympanic membrane is not injected, scarred, perforated or bulging. Nose: No nasal deformity, septal deviation, mucosal edema or rhinorrhea. Right Sinus: No maxillary sinus tenderness or frontal sinus tenderness. Left Sinus: No maxillary sinus tenderness or frontal sinus tenderness. Mouth/Throat:      Mouth: No oral lesions. Dentition: Normal dentition. Does not have dentures. No dental caries or dental abscesses. Pharynx: No oropharyngeal exudate or posterior oropharyngeal erythema. Tonsils: No tonsillar abscesses. Eyes:      General: Lids are normal. No scleral icterus.      Extraocular Movements: Right eye: Normal extraocular motion. Left eye: Normal extraocular motion. Conjunctiva/sclera: Conjunctivae normal.      Right eye: Right conjunctiva is not injected. Left eye: Left conjunctiva is not injected. Neck:      Musculoskeletal: Normal range of motion and neck supple. Normal range of motion. No edema or erythema. Thyroid: No thyroid mass or thyromegaly. Vascular: No carotid bruit or JVD. Trachea: Trachea normal.   Cardiovascular:      Rate and Rhythm: Normal rate and regular rhythm. Heart sounds: Normal heart sounds, S1 normal and S2 normal. No murmur. No friction rub. No gallop. Pulmonary:      Effort: Pulmonary effort is normal. No respiratory distress. Breath sounds: Normal breath sounds. No wheezing, rhonchi or rales. Chest:      Chest wall: No tenderness. Abdominal:      General: Bowel sounds are normal.      Palpations: Abdomen is soft. There is no hepatomegaly, splenomegaly or mass. Tenderness: There is no guarding or rebound. Hernia: No hernia is present. There is no hernia in the ventral area or left inguinal area. Musculoskeletal: Normal range of motion. General: No tenderness. Lymphadenopathy:      Head:      Right side of head: No submental, submandibular, tonsillar, preauricular, posterior auricular or occipital adenopathy. Left side of head: No submental, submandibular, tonsillar, preauricular, posterior auricular or occipital adenopathy. Cervical: No cervical adenopathy. Right cervical: No superficial, deep or posterior cervical adenopathy. Left cervical: No superficial, deep or posterior cervical adenopathy. Upper Body:      Right upper body: No supraclavicular or pectoral adenopathy. Left upper body: No supraclavicular or pectoral adenopathy. Skin:     General: Skin is warm and dry. Coloration: Skin is not pale. Findings: No bruising, ecchymosis, laceration, lesion or rash. Nails: There is no clubbing. Neurological:      Mental Status: He is alert and oriented to person, place, and time. Cranial Nerves: No cranial nerve deficit. Motor: No abnormal muscle tone. Coordination: Coordination normal.      Deep Tendon Reflexes: Reflexes normal.      Comments: Negative pedal exam.  Pedal pulse palpable   Psychiatric:         Speech: Speech normal.         Behavior: Behavior normal.         Thought Content: Thought content normal.         Judgment: Judgment normal.       /60   Pulse 68   Temp 97.1 °F (36.2 °C) (Temporal)   Resp 20   Ht 5' 7\" (1.702 m)   Wt 209 lb (94.8 kg)   BMI 32.73 kg/m²       Impression/Plan:  1. Type 2 diabetes mellitus without complication, with long-term current use of insulin ([facility]    2. Seasonal affective disorder ([facility]    3. Depression, unspecified depression type    4. Essential hypertension    5. Mixed hyperlipidemia    6. S/P shoulder surgery    7.  Screening for prostate cancer      Requested Prescriptions     Signed Prescriptions Disp Refills    Insulin Pen Needle (PEN NEEDLES) 31G X 6 MM MISC 100 each 11     Si each by Does not apply route 3 times daily E11.9 Z79.4    dapagliflozin (FARXIGA) 10 MG tablet 90 tablet 3     Sig: Take 1 tablet by mouth every morning    omeprazole (PRILOSEC) 40 MG delayed release capsule 90 capsule 3     Sig: Take 1 capsule by mouth daily    SITagliptin (JANUVIA) 100 MG tablet 90 tablet 3     Sig: take 1 tablet by mouth once daily    LANTUS SOLOSTAR 100 UNIT/ML injection pen 10 pen 11     Sig: Inject 120 Units into the skin nightly    FLUoxetine (PROZAC) 20 MG capsule 90 capsule 3     Sig: Take 1 capsule by mouth daily    FLUoxetine (PROZAC) 40 MG capsule 90 capsule 3     Sig: Take 1 capsule by mouth daily     Orders Placed This Encounter   Procedures    Comprehensive Metabolic Panel     Standing Status:   Future     Standing Expiration Date:   2021    Lipid Panel Standing Status:   Future     Standing Expiration Date:   12/14/2021     Order Specific Question:   Is Patient Fasting?/# of Hours     Answer:   yes/12    Microalbumin / Creatinine Urine Ratio     Standing Status:   Future     Standing Expiration Date:   12/14/2021    Psa screening     Standing Status:   Future     Standing Expiration Date:   12/14/2021    Hemoglobin A1C     Standing Status:   Future     Standing Expiration Date:   12/14/2021       Patient giveneducational materials - see patient instructions. Discussed use, benefit, and side effects of prescribed medications. All patient questions answered. Pt voiced understanding. Reviewed health maintenance. Patient agreedwith treatment plan. Follow up as directed. Routine labs ordered. Prozac increased to 60 mg daily. Follow-up 2 months. Continue medications as prescribed.  Educational information on above diagnosis  provided per AVS.         Electronically signed by YAMILE Rodriguez CNP on 12/15/2020 at 9:49 AM

## 2021-01-05 ENCOUNTER — HOSPITAL ENCOUNTER (EMERGENCY)
Age: 56
Discharge: HOME | End: 2021-01-05
Payer: COMMERCIAL

## 2021-01-05 VITALS
HEART RATE: 70 BPM | SYSTOLIC BLOOD PRESSURE: 140 MMHG | OXYGEN SATURATION: 97 % | HEIGHT: 67 IN | TEMPERATURE: 96.5 F | DIASTOLIC BLOOD PRESSURE: 81 MMHG | WEIGHT: 210 LBS | RESPIRATION RATE: 16 BRPM | BODY MASS INDEX: 32.96 KG/M2

## 2021-01-05 DIAGNOSIS — Z20.822 SUSPECTED COVID-19 VIRUS INFECTION: Primary | ICD-10-CM

## 2021-01-05 PROCEDURE — 99213 OFFICE O/P EST LOW 20 MIN: CPT

## 2021-01-05 PROCEDURE — 99214 OFFICE O/P EST MOD 30 MIN: CPT | Performed by: NURSE PRACTITIONER

## 2021-01-05 PROCEDURE — U0003 INFECTIOUS AGENT DETECTION BY NUCLEIC ACID (DNA OR RNA); SEVERE ACUTE RESPIRATORY SYNDROME CORONAVIRUS 2 (SARS-COV-2) (CORONAVIRUS DISEASE [COVID-19]), AMPLIFIED PROBE TECHNIQUE, MAKING USE OF HIGH THROUGHPUT TECHNOLOGIES AS DESCRIBED BY CMS-2020-01-R: HCPCS

## 2021-01-05 RX ORDER — ASCORBIC ACID 500 MG
500 TABLET ORAL 2 TIMES DAILY
Qty: 14 TABLET | Refills: 0 | Status: SHIPPED | OUTPATIENT
Start: 2021-01-05 | End: 2021-08-10

## 2021-01-05 RX ORDER — ZINC SULFATE 50(220)MG
50 CAPSULE ORAL DAILY
Qty: 7 CAPSULE | Refills: 0 | Status: SHIPPED | OUTPATIENT
Start: 2021-01-05 | End: 2021-08-10

## 2021-01-05 RX ORDER — ACETAMINOPHEN 500 MG
500 TABLET ORAL EVERY 4 HOURS PRN
Qty: 20 TABLET | Refills: 0 | Status: SHIPPED | OUTPATIENT
Start: 2021-01-05 | End: 2022-07-29

## 2021-01-05 ASSESSMENT — ENCOUNTER SYMPTOMS
APNEA: 0
STRIDOR: 0
SHORTNESS OF BREATH: 0
CONSTIPATION: 0
COUGH: 0
VOMITING: 0
DIARRHEA: 0
CHOKING: 0
ABDOMINAL PAIN: 0
CHEST TIGHTNESS: 0
WHEEZING: 0
NAUSEA: 0

## 2021-01-05 NOTE — ED TRIAGE NOTES
Patient ambulated to room with loss of taste and smell that started today.  Denies any other symptoms but states 2 members in home just recovered from [facility]

## 2021-01-05 NOTE — ED PROVIDER NOTES
[facility]  Urgent Care Encounter      CHIEF COMPLAINT       Chief Complaint   Patient presents with    Concern For COVID-19       Nurses Notes reviewed and I agree except as noted in the HPI. HISTORY OFPRESENT ILLNESS   Javier Ridley is a 60 y.o. Denies symptoms other than loss of taste and smell. He reports 2 of his household members are positive for Covid 19. Kaitlin Gutierrez is off work until the end of February for shoulder surgery recovery. The history is provided by the patient. No  was used. REVIEW OF SYSTEMS     Review of Systems   Constitutional: Positive for appetite change. Negative for activity change, chills, diaphoresis, fatigue, fever and unexpected weight change. Respiratory: Negative for apnea, cough, choking, chest tightness, shortness of breath, wheezing and stridor. Cardiovascular: Negative for chest pain, palpitations and leg swelling. Gastrointestinal: Negative for abdominal pain, constipation, diarrhea, nausea and vomiting. Neurological: Negative for dizziness, weakness, light-headedness and headaches. Psychiatric/Behavioral: Negative for sleep disturbance. PAST MEDICAL HISTORY         Diagnosis Date    Depression with anxiety     Diabetes mellitus (HCC)     GERD (gastroesophageal reflux disease)     Hyperlipidemia     Hypertension        SURGICAL HISTORY     Patient  has a past surgical history that includes Breast surgery (1996); Tonsillectomy (1973); Cardiac catheterization (4-2008); shoulder surgery (2017); and shoulder surgery (11/30/2020).     CURRENT MEDICATIONS       Previous Medications    ATORVASTATIN (LIPITOR) 40 MG TABLET    take 1 tablet by mouth once daily    DAPAGLIFLOZIN (FARXIGA) 10 MG TABLET    Take 1 tablet by mouth every morning    FLUOXETINE (PROZAC) 20 MG CAPSULE    Take 1 capsule by mouth daily    FLUOXETINE (PROZAC) 40 MG CAPSULE    Take 1 capsule by mouth daily    INSULIN LISPRO (HUMALOG KWIKPEN) 100 UNIT/ML PEN    Inject 35-45 Units into the skin 3 times daily (before meals)    INSULIN PEN NEEDLE (PEN NEEDLES) 31G X 6 MM MISC    1 each by Does not apply route 3 times daily E11.9 Z79.4    LANTUS SOLOSTAR 100 UNIT/ML INJECTION PEN    Inject 120 Units into the skin nightly    LISINOPRIL (PRINIVIL;ZESTRIL) 10 MG TABLET    Take 1 tablet by mouth daily    MULTIPLE VITAMINS-MINERALS (THERAPEUTIC MULTIVITAMIN-MINERALS) TABLET    Take 1 tablet by mouth daily    OMEPRAZOLE (PRILOSEC) 40 MG DELAYED RELEASE CAPSULE    Take 1 capsule by mouth daily    SITAGLIPTIN (JANUVIA) 100 MG TABLET    take 1 tablet by mouth once daily       ALLERGIES     Patient is is allergic to penicillins; crestor [rosuvastatin]; and contrast [gadolinium derivatives]. FAMILY HISTORY     Patient's family history is not on file. SOCIAL HISTORY     Patient  reports that he quit smoking about 15 years ago. His smoking use included cigarettes. He started smoking about 40 years ago. He has a 25.00 pack-year smoking history. He has never used smokeless tobacco. He reports current alcohol use. He reports that he does not use drugs. PHYSICAL EXAM     ED TRIAGE VITALS  BP: (!) 140/81, Temp: 96.5 °F (35.8 °C), Pulse: 70, Resp: 16, SpO2: 97 %  Physical Exam  Vitals signs and nursing note reviewed. Constitutional:       General: He is not in acute distress. Appearance: Normal appearance. He is normal weight. He is not ill-appearing, toxic-appearing or diaphoretic. HENT:      Head: Normocephalic and atraumatic. Right Ear: External ear normal.      Left Ear: External ear normal.   Eyes:      Extraocular Movements: Extraocular movements intact. Conjunctiva/sclera: Conjunctivae normal.   Neck:      Musculoskeletal: Normal range of motion. Pulmonary:      Effort: Pulmonary effort is normal. No respiratory distress. Breath sounds: Normal breath sounds. No stridor. No wheezing, rhonchi or rales. Chest:      Chest wall: No tenderness. Musculoskeletal: Normal range of motion. Neurological:      General: No focal deficit present. Mental Status: He is alert and oriented to person, place, and time. Psychiatric:         Mood and Affect: Mood normal.         Behavior: Behavior normal.         Thought Content: Thought content normal.         Judgment: Judgment normal.         DIAGNOSTIC RESULTS   Labs:No results found for this visit on 01/05/21. IMAGING:  No orders to display     URGENT CARE COURSE:     Vitals:    01/05/21 1118   BP: (!) 140/81   Pulse: 70   Resp: 16   Temp: 96.5 °F (35.8 °C)   TempSrc: Temporal   SpO2: 97%   Weight: 210 lb (95.3 kg)   Height: 5' 7\" (1.702 m)       Medications - No data to display  PROCEDURES:  None  FINAL IMPRESSION      1. Suspected COVID-19 virus infection        DISPOSITION/PLAN   Decision To Discharge    Patient is to self isolate until COVID-19 test result is known. If applicable, work/school note has been provided to be off until COVID-19 test results. Follow with family provider for further work/school note needs. May take over-the-counter supplements if no contraindications:  Vitamin C 500 mg by mouth twice daily  B Complex vitamin by mouth daily   Zinc 50 mg by mouth daily   Vitamin D 3000 u/day  Melatonin as directed on bottle. WARNING: May cause drowsiness. May impair ability to operate vehicles or machinery. Do not use in combination with other sleep aides or alcohol.       PATIENT REFERRED TO:  Kamilla Concepcion MD  [address] [place] [address]  [phone]    Schedule an appointment as soon as possible for a visit in 3 days  Follow up within 3 days, Return to ED sooner if symptoms worsen    DISCHARGE MEDICATIONS:  New Prescriptions    ACETAMINOPHEN (TYLENOL) 500 MG TABLET    Take 1 tablet by mouth every 4 hours as needed for Pain or Fever    ASCORBIC ACID (VITAMIN C) 500 MG TABLET    Take 1 tablet by mouth 2 times daily for 7 days    ZINC SULFATE (ZINCATE) 220 (50 ZN) MG CAPSULE Take 1 capsule by mouth daily for 7 days     Current Discharge Medication List          YAMILE Marie CNP, APRN - CNP  01/05/21 1142

## 2021-01-06 ENCOUNTER — CARE COORDINATION (OUTPATIENT)
Dept: CARE COORDINATION | Age: 56
End: 2021-01-06

## 2021-01-06 NOTE — CARE COORDINATION
Patient to [facility] 2021 with loss of taste and smell. He reports 2 of his household members are positive for Covid 19. COVID test Pending. FINAL IMPRESSION       1. Suspected COVID-19 virus infection        Outreach call to follow up with patient for follow up ED visit/COVID monitoring, no answer, left detailed vm to return call to this nurse at [phone]. Patient returns call and states he is feeling about the same today. He does have some nasal congestion. Instructed patient to call PCP or send [employer] message for follow up appt. Patient enrolled in Loop program.        Patient contacted regarding COVID-19 exposure. Discussed COVID-19 related testing which was pending at this time. Test results were pending. Patient informed of results, if available? Pending    Care Transition Nurse/ Ambulatory Care Manager contacted the patient by telephone to perform post discharge assessment. Call within 2 business days of discharge: Yes. Verified name and  with patient as identifiers. Provided introduction to self, and explanation of the CTN/ACM role, and reason for call due to risk factors for infection and/or exposure to COVID-19. Symptoms reviewed with patient who verbalized the following symptoms: no worsening symptoms. Due to no new or worsening symptoms encounter was not routed to provider for escalation. Discussed follow-up appointments. If no appointment was previously scheduled, appointment scheduling offered: Yes  [address] follow up appointment(s):   Future Appointments   Date Time Provider Bryant Shepherd   2/3/2021  9:00 AM Thomas Mathias MD [address] [facility]     [id number] [address] follow up appointment(s):     Non-face-to-face services provided:  Scheduled appointment with PCP-instructed to call PCP or send [employer] message for f/u appt.    Obtained and reviewed discharge summary and/or continuity of care documents  Reviewed and followed up on pending diagnostic tests and

## 2021-01-07 LAB — SARS-COV-2: DETECTED

## 2021-01-12 RX ORDER — ATORVASTATIN CALCIUM 40 MG/1
TABLET, FILM COATED ORAL
Qty: 90 TABLET | Refills: 0 | Status: SHIPPED | OUTPATIENT
Start: 2021-01-12 | End: 2022-05-09

## 2021-04-14 RX ORDER — LISINOPRIL 10 MG/1
TABLET ORAL
Qty: 90 TABLET | Refills: 1 | Status: SHIPPED | OUTPATIENT
Start: 2021-04-14 | End: 2021-10-18

## 2021-08-07 ENCOUNTER — HOSPITAL ENCOUNTER (EMERGENCY)
Age: 56
Discharge: HOME | End: 2021-08-07
Payer: COMMERCIAL

## 2021-08-07 VITALS
TEMPERATURE: 97.9 F | WEIGHT: 215 LBS | DIASTOLIC BLOOD PRESSURE: 65 MMHG | SYSTOLIC BLOOD PRESSURE: 138 MMHG | HEART RATE: 83 BPM | RESPIRATION RATE: 18 BRPM | BODY MASS INDEX: 33.74 KG/M2 | HEIGHT: 67 IN | OXYGEN SATURATION: 96 %

## 2021-08-07 DIAGNOSIS — J06.9 ACUTE UPPER RESPIRATORY INFECTION: ICD-10-CM

## 2021-08-07 DIAGNOSIS — J02.9 ACUTE PHARYNGITIS, UNSPECIFIED ETIOLOGY: Primary | ICD-10-CM

## 2021-08-07 LAB
GROUP A STREP CULTURE, REFLEX: NEGATIVE
REFLEX THROAT C + S: NORMAL

## 2021-08-07 PROCEDURE — 87070 CULTURE OTHR SPECIMN AEROBIC: CPT

## 2021-08-07 PROCEDURE — 99214 OFFICE O/P EST MOD 30 MIN: CPT | Performed by: NURSE PRACTITIONER

## 2021-08-07 PROCEDURE — 99213 OFFICE O/P EST LOW 20 MIN: CPT

## 2021-08-07 PROCEDURE — 87880 STREP A ASSAY W/OPTIC: CPT

## 2021-08-07 RX ORDER — BENZONATATE 100 MG/1
100 CAPSULE ORAL 3 TIMES DAILY PRN
Qty: 30 CAPSULE | Refills: 0 | Status: SHIPPED | OUTPATIENT
Start: 2021-08-07 | End: 2021-08-14

## 2021-08-07 ASSESSMENT — PAIN DESCRIPTION - ORIENTATION: ORIENTATION: RIGHT

## 2021-08-07 ASSESSMENT — ENCOUNTER SYMPTOMS
DIARRHEA: 0
ABDOMINAL PAIN: 0
SINUS PRESSURE: 0
SINUS PAIN: 0
NAUSEA: 0
CONSTIPATION: 0
WHEEZING: 0
COUGH: 1
RHINORRHEA: 0
SHORTNESS OF BREATH: 0
SORE THROAT: 1
VOMITING: 0

## 2021-08-07 ASSESSMENT — PAIN DESCRIPTION - LOCATION: LOCATION: CHEST

## 2021-08-07 ASSESSMENT — PAIN DESCRIPTION - ONSET: ONSET: ON-GOING

## 2021-08-07 ASSESSMENT — PAIN DESCRIPTION - PROGRESSION: CLINICAL_PROGRESSION: GRADUALLY WORSENING

## 2021-08-07 ASSESSMENT — PAIN DESCRIPTION - PAIN TYPE: TYPE: ACUTE PAIN

## 2021-08-07 ASSESSMENT — PAIN DESCRIPTION - DESCRIPTORS: DESCRIPTORS: DISCOMFORT

## 2021-08-07 ASSESSMENT — PAIN SCALES - GENERAL: PAINLEVEL_OUTOF10: 2

## 2021-08-07 ASSESSMENT — PAIN DESCRIPTION - FREQUENCY: FREQUENCY: CONTINUOUS

## 2021-08-07 NOTE — ED NOTES
Throat swab for strep obtained,labeled, taken to lab, tolerated well.      Rohit Bernal LPN  21/02/65 3635

## 2021-08-07 NOTE — ED PROVIDER NOTES
[id number]       Chief Complaint   Patient presents with   Mery Burns     right    Nasal Congestion    Pharyngitis       Nurses Notes reviewed and I agree except as noted in the HPI. HISTORY OF PRESENT ILLNESS   Itzel Milton is a 60 y.o. male who presents with complaints of right ear pain that radiates down into his neck, throat pain, and cough. Patient states symptoms began 2 days ago. He denies any injury or trauma to the right ear. He has not been swimming recently. Reports pain with swallowing but no difficulty with swallowing. Patient reports fever. He states his temperature reached 102 °F yesterday. He has not taken any over-the-counter medications for his symptoms. Has not been around anybody ill. No recent travel. He also reports cough. Denies wheezing or shortness of breath. No chest pain. He has not had any headache, dizziness, sinus pressure congestion. No nausea, vomiting, diarrhea, abdominal pain. REVIEW OF SYSTEMS     Review of Systems   Constitutional: Positive for fever. Negative for fatigue. HENT: Positive for ear pain and sore throat. Negative for ear discharge, postnasal drip, rhinorrhea, sinus pressure and sinus pain. Respiratory: Positive for cough. Negative for shortness of breath and wheezing. Cardiovascular: Negative for chest pain and palpitations. Gastrointestinal: Negative for abdominal pain, constipation, diarrhea, nausea and vomiting. Musculoskeletal: Negative for myalgias. Skin: Negative for rash. Neurological: Negative for dizziness, light-headedness, numbness and headaches. PAST MEDICAL HISTORY         Diagnosis Date    Depression with anxiety     Diabetes mellitus (HCC)     GERD (gastroesophageal reflux disease)     Hyperlipidemia     Hypertension        SURGICAL HISTORY     Patient  has a past surgical history that includes Breast surgery (1996);  Tonsillectomy (1973); Cardiac catheterization (4-2008); shoulder surgery (2017); and shoulder surgery (11/30/2020). CURRENT MEDICATIONS       Previous Medications    ACETAMINOPHEN (TYLENOL) 500 MG TABLET    Take 1 tablet by mouth every 4 hours as needed for Pain or Fever    ASCORBIC ACID (VITAMIN C) 500 MG TABLET    Take 1 tablet by mouth 2 times daily for 7 days    ATORVASTATIN (LIPITOR) 40 MG TABLET    take 1 tablet by mouth once daily    DAPAGLIFLOZIN (FARXIGA) 10 MG TABLET    Take 1 tablet by mouth every morning    FLUOXETINE (PROZAC) 20 MG CAPSULE    Take 1 capsule by mouth daily    FLUOXETINE (PROZAC) 40 MG CAPSULE    Take 1 capsule by mouth daily    INSULIN LISPRO (HUMALOG KWIKPEN) 100 UNIT/ML PEN    Inject 35-45 Units into the skin 3 times daily (before meals)    INSULIN PEN NEEDLE (PEN NEEDLES) 31G X 6 MM MISC    1 each by Does not apply route 3 times daily E11.9 Z79.4    LANTUS SOLOSTAR 100 UNIT/ML INJECTION PEN    Inject 120 Units into the skin nightly    LISINOPRIL (PRINIVIL;ZESTRIL) 10 MG TABLET    take 1 tablet by mouth once daily    MULTIPLE VITAMINS-MINERALS (THERAPEUTIC MULTIVITAMIN-MINERALS) TABLET    Take 1 tablet by mouth daily    OMEPRAZOLE (PRILOSEC) 40 MG DELAYED RELEASE CAPSULE    Take 1 capsule by mouth daily    SITAGLIPTIN (JANUVIA) 100 MG TABLET    take 1 tablet by mouth once daily    ZINC SULFATE (ZINCATE) 220 (50 ZN) MG CAPSULE    Take 1 capsule by mouth daily for 7 days       ALLERGIES     Patient is is allergic to penicillins, crestor [rosuvastatin], and contrast [gadolinium derivatives]. FAMILY HISTORY     Patient'sfamily history is not on file. SOCIAL HISTORY     Patient  reports that he quit smoking about 16 years ago. His smoking use included cigarettes. He started smoking about 40 years ago. He has a 25.00 pack-year smoking history. He has never used smokeless tobacco. He reports current alcohol use. He reports that he does not use drugs.     PHYSICAL EXAM     ED TRIAGE VITALS  BP: 138/65, Temp: 97.9 °F (36.6 °C), Pulse: 83, Resp: 18, SpO2: 96 %  Physical Exam  Vitals and nursing note reviewed. Constitutional:       General: He is awake. Appearance: Normal appearance. He is well-developed. HENT:      Head: Normocephalic and atraumatic. Right Ear: Hearing and ear canal normal. A middle ear effusion is present. Tympanic membrane is not erythematous. Left Ear: Hearing and ear canal normal. A middle ear effusion is present. Tympanic membrane is not erythematous. Nose: Nose normal. No congestion. Mouth/Throat:      Mouth: Mucous membranes are moist.      Pharynx: Uvula midline. Posterior oropharyngeal erythema present. No pharyngeal swelling or oropharyngeal exudate. Cardiovascular:      Rate and Rhythm: Normal rate and regular rhythm. Heart sounds: Normal heart sounds. Pulmonary:      Effort: Pulmonary effort is normal.      Breath sounds: Normal breath sounds. Musculoskeletal:      Cervical back: Normal range of motion and neck supple. Lymphadenopathy:      Cervical: No cervical adenopathy. Skin:     General: Skin is warm and dry. Neurological:      Mental Status: He is alert and oriented to person, place, and time. GCS: GCS eye subscore is 4. GCS verbal subscore is 5. GCS motor subscore is 6. Psychiatric:         Behavior: Behavior is cooperative. DIAGNOSTIC RESULTS   Labs:   Results for orders placed or performed during the hospital encounter of 08/07/21   STREP A ANTIGEN   Result Value Ref Range    GROUP A STREP CULTURE, REFLEX Negative        IMAGING:  No orders to display     URGENT CARE COURSE:     Vitals:    08/07/21 0820   BP: 138/65   Pulse: 83   Resp: 18   Temp: 97.9 °F (36.6 °C)   TempSrc: Temporal   SpO2: 96%   Weight: 215 lb (97.5 kg)   Height: 5' 7\" (1.702 m)       Medications - No data to display  PROCEDURES:  None  FINALIMPRESSION      1. Acute pharyngitis, unspecified etiology    2.  Acute upper respiratory infection DISPOSITION/PLAN   DISPOSITION      Recommended testing for both COVID-19 and influenza. Patient declines both. Patient has signs and symptoms of upper respiratory infection / viral illness. Patient is afebrile and stable. Patient can be treated with over the counter medications. Patient can use Tylenol or Motrin over the counter as needed for pain or fever. Antibiotics are not indicated at the present time. Increase fluids and rest. Use salt water gargles as needed. Use saline nasal drops or nasal rinses and humidify the air. Advised to follow up with family doctor or return to urgent care if does not get better or symptoms worsen. Pt can go to ER if high fever greater than 102, vomiting, breathing difficulty, lethargy. Patient has been recommended to remain home for the next 48 hours. If he still has fever and/or symptoms tomorrow he should remain home until symptoms improve and he is afebrile for 24 hours without the use of fever reducing medication. Patient understands this approach of home management and is agreeable to the treatment plan.     PATIENT REFERRED TO:  Yvette Sanchez MD  [address] [place] [address]  [phone]    Call in 2 days  As needed, If symptoms worsen go to emergency room    DISCHARGE MEDICATIONS:  New Prescriptions    BENZONATATE (TESSALON PERLES) 100 MG CAPSULE    Take 1 capsule by mouth 3 times daily as needed for Cough     Current Discharge Medication List          Margaux Melvin, [facility], APRN - CNP  08/07/21 3897

## 2021-08-09 ENCOUNTER — TELEPHONE (OUTPATIENT)
Dept: FAMILY MEDICINE CLINIC | Age: 56
End: 2021-08-09

## 2021-08-09 LAB — THROAT/NOSE CULTURE: NORMAL

## 2021-08-10 ENCOUNTER — OFFICE VISIT (OUTPATIENT)
Dept: FAMILY MEDICINE CLINIC | Age: 56
End: 2021-08-10
Payer: COMMERCIAL

## 2021-08-10 ENCOUNTER — NURSE TRIAGE (OUTPATIENT)
Dept: OTHER | Facility: CLINIC | Age: 56
End: 2021-08-10

## 2021-08-10 VITALS
HEART RATE: 68 BPM | OXYGEN SATURATION: 97 % | DIASTOLIC BLOOD PRESSURE: 74 MMHG | WEIGHT: 213 LBS | TEMPERATURE: 98.5 F | RESPIRATION RATE: 20 BRPM | BODY MASS INDEX: 32.28 KG/M2 | HEIGHT: 68 IN | SYSTOLIC BLOOD PRESSURE: 116 MMHG

## 2021-08-10 DIAGNOSIS — Z79.4 TYPE 2 DIABETES MELLITUS WITHOUT COMPLICATION, WITH LONG-TERM CURRENT USE OF INSULIN (HCC): Chronic | ICD-10-CM

## 2021-08-10 DIAGNOSIS — J06.9 URI WITH COUGH AND CONGESTION: Primary | ICD-10-CM

## 2021-08-10 DIAGNOSIS — E11.9 TYPE 2 DIABETES MELLITUS WITHOUT COMPLICATION, WITH LONG-TERM CURRENT USE OF INSULIN (HCC): Chronic | ICD-10-CM

## 2021-08-10 LAB — HBA1C MFR BLD: 8.5 %

## 2021-08-10 PROCEDURE — 99214 OFFICE O/P EST MOD 30 MIN: CPT | Performed by: NURSE PRACTITIONER

## 2021-08-10 PROCEDURE — G8417 CALC BMI ABV UP PARAM F/U: HCPCS | Performed by: NURSE PRACTITIONER

## 2021-08-10 PROCEDURE — 83036 HEMOGLOBIN GLYCOSYLATED A1C: CPT | Performed by: NURSE PRACTITIONER

## 2021-08-10 PROCEDURE — 2022F DILAT RTA XM EVC RTNOPTHY: CPT | Performed by: NURSE PRACTITIONER

## 2021-08-10 PROCEDURE — 1036F TOBACCO NON-USER: CPT | Performed by: NURSE PRACTITIONER

## 2021-08-10 PROCEDURE — G8427 DOCREV CUR MEDS BY ELIG CLIN: HCPCS | Performed by: NURSE PRACTITIONER

## 2021-08-10 PROCEDURE — 3052F HG A1C>EQUAL 8.0%<EQUAL 9.0%: CPT | Performed by: NURSE PRACTITIONER

## 2021-08-10 PROCEDURE — 3017F COLORECTAL CA SCREEN DOC REV: CPT | Performed by: NURSE PRACTITIONER

## 2021-08-10 RX ORDER — FLASH GLUCOSE SENSOR
1 KIT MISCELLANEOUS
Qty: 2 EACH | Refills: 3 | Status: SHIPPED | OUTPATIENT
Start: 2021-08-10 | End: 2021-12-07

## 2021-08-10 RX ORDER — LEVOFLOXACIN 500 MG/1
500 TABLET, FILM COATED ORAL DAILY
Qty: 7 TABLET | Refills: 0 | Status: SHIPPED | OUTPATIENT
Start: 2021-08-10 | End: 2021-08-17

## 2021-08-10 RX ORDER — GUAIFENESIN AND CODEINE PHOSPHATE 100; 10 MG/5ML; MG/5ML
5 SOLUTION ORAL 2 TIMES DAILY PRN
Qty: 70 ML | Refills: 0 | Status: SHIPPED | OUTPATIENT
Start: 2021-08-10 | End: 2021-08-17

## 2021-08-10 SDOH — ECONOMIC STABILITY: FOOD INSECURITY: WITHIN THE PAST 12 MONTHS, YOU WORRIED THAT YOUR FOOD WOULD RUN OUT BEFORE YOU GOT MONEY TO BUY MORE.: NEVER TRUE

## 2021-08-10 SDOH — ECONOMIC STABILITY: FOOD INSECURITY: WITHIN THE PAST 12 MONTHS, THE FOOD YOU BOUGHT JUST DIDN'T LAST AND YOU DIDN'T HAVE MONEY TO GET MORE.: NEVER TRUE

## 2021-08-10 ASSESSMENT — ENCOUNTER SYMPTOMS
RHINORRHEA: 1
EYE DISCHARGE: 0
NAUSEA: 0
SHORTNESS OF BREATH: 1
ALLERGIC/IMMUNOLOGIC NEGATIVE: 1
WHEEZING: 1
COUGH: 1
DIARRHEA: 0
VOMITING: 0
ABDOMINAL PAIN: 0
HEARTBURN: 0
SORE THROAT: 1
HEMOPTYSIS: 0
CONSTIPATION: 0
BACK PAIN: 0
TROUBLE SWALLOWING: 0
EYE PAIN: 0
EYE REDNESS: 0

## 2021-08-10 ASSESSMENT — SOCIAL DETERMINANTS OF HEALTH (SDOH): HOW HARD IS IT FOR YOU TO PAY FOR THE VERY BASICS LIKE FOOD, HOUSING, MEDICAL CARE, AND HEATING?: NOT HARD AT ALL

## 2021-08-10 NOTE — PROGRESS NOTES
Covid specimen was sent to [facility] lab per [facility] CNP order.  Specimen tracking order [id number]

## 2021-08-10 NOTE — TELEPHONE ENCOUNTER
Received call from [facility] with [facility]. Brief description of triage: Pt with productive cough and sob since Thursday. Sob is intermittent with walking. Chest pain with coughing. Temp lat night of 100.4. Triage indicates for patient to go to office now. Care advice provided, patient verbalizes understanding; denies any other questions or concerns; instructed to call back for any new or worsening symptoms. Writer provided warm transfer to [facility] for appointment scheduling. Attention Provider: Thank you for allowing me to participate in the care of your patient. The patient was connected to triage in response to information provided to the [facility]. Please do not respond through this encounter as the response is not directed to a shared pool. Reason for Disposition   MILD difficulty breathing (e.g., minimal/no SOB at rest, SOB with walking, pulse < 100) of new onset or worse than normal    Answer Assessment - Initial Assessment Questions  1. RESPIRATORY STATUS: \"Describe your breathing? \" (e.g., wheezing, shortness of breath, unable to speak, severe coughing)       Feels like it is settling deep in chest. Has used nebulizer. Is fatigued. Is taking dayquil and Mucinex. Will feel sob with walking. No sob at rest.    2. ONSET: \"When did this breathing problem begin? \"    symptoms started last Thursday, went to [facility] Saturday, strep was negative    3. PATTERN \"Does the difficult breathing come and go, or has it been constant since it started? \"   Intermittent with walking    4. SEVERITY: \"How bad is your breathing? \" (e.g., mild, moderate, severe)     - MILD: No SOB at rest, mild SOB with walking, speaks normally in sentences, can lay down, no retractions, pulse < 100.     - MODERATE: SOB at rest, SOB with minimal exertion and prefers to sit, cannot lie down flat, speaks in phrases, mild retractions, audible wheezing, pulse 100-120.     - SEVERE: Very SOB at rest, speaks in single words, struggling to breathe, sitting hunched forward, retractions, pulse > 120     Mild sob    5. RECURRENT SYMPTOM: \"Have you had difficulty breathing before? \" If so, ask: \"When was the last time? \" and \"What happened that time? \"       Gets pneumonia easily    6. CARDIAC HISTORY: \"Do you have any history of heart disease? \" (e.g., heart attack, angina, bypass surgery, angioplasty)     No    7. LUNG HISTORY: \"Do you have any history of lung disease? \"  (e.g., pulmonary embolus, asthma, emphysema)      No    8. CAUSE: \"What do you think is causing the breathing problem? \"       Concerned about bronchitis  Granddaughter with RSV, son with same symptoms    9. OTHER SYMPTOMS: \"Do you have any other symptoms? (e.g., dizziness, runny nose, cough, chest pain, fever)  Cough is all the time and productive  Chest pain when coughing  Hasn't taken temp yet today. Temp 100.4 last night. 10. PREGNANCY: \"Is there any chance you are pregnant? \" \"When was your last menstrual period? \"   na    11. TRAVEL: \"Have you traveled out of the country in the last month? \" (e.g., travel history, exposures)    Protocols used: BREATHING DIFFICULTY-ADULT-[place]

## 2021-08-10 NOTE — PROGRESS NOTES
[address] [address] [address]  [facility] [address]  Dept: [phone]  Dept Fax: [phone]  Loc: [phone]     Visit Date:  8/10/2021      Patient:  Shaquille Perry  YOB: 1965    HPI:     Chief Complaint   Patient presents with    ED Follow-up     8/7 [facility]  Acute Pharyngitis, Acute URI-Strep negative, D/c with tessalon perles. Needs a RTW note for 8/13.  Cough     coughs up yellowish to brown phlegm, chest tightness, sob with exertion, right earache, stuffy nose, yellow nasal drainage, sinus pressure, h/a. Sx for 6 days, taking tessalon perles, dayquil, nyquil, mucinex and zycam. Had COVID in 1/2021, has not been vaccinated.  Fever     100.4 last night. Cough  This is a new problem. The current episode started in the past 7 days. The problem has been gradually worsening. The problem occurs every few minutes. The cough is non-productive. Associated symptoms include chills, a fever, headaches, myalgias, nasal congestion, rhinorrhea, a sore throat, shortness of breath, sweats and wheezing. Pertinent negatives include no chest pain, ear congestion, ear pain, eye redness, heartburn, hemoptysis, postnasal drip, rash or weight loss. There is no history of asthma, bronchiectasis, bronchitis, COPD, emphysema, environmental allergies or pneumonia. Medications    Current Outpatient Medications:     dapagliflozin (FARXIGA) 10 MG tablet, Take 10 mg by mouth, Disp: , Rfl:     levoFLOXacin (LEVAQUIN) 500 MG tablet, Take 1 tablet by mouth daily for 7 days, Disp: 7 tablet, Rfl: 0    guaiFENesin-codeine (TUSSI-ORGANIDIN NR) 100-10 MG/5ML syrup, Take 5 mLs by mouth 2 times daily as needed for Cough for up to 7 days. , Disp: 70 mL, Rfl: 0    Continuous Blood Gluc Sensor (FREESTYLE VANESSA 14 DAY SENSOR) MISC, 1 Device by Does not apply route every 14 days, Disp: 2 each, Rfl: 3    benzonatate (TESSALON PERLES) 100 MG capsule, Take 1 capsule by mouth 3 times daily as needed for Cough, Disp: 30 capsule, Rfl: 0    lisinopril (PRINIVIL;ZESTRIL) 10 MG tablet, take 1 tablet by mouth once daily, Disp: 90 tablet, Rfl: 1    atorvastatin (LIPITOR) 40 MG tablet, take 1 tablet by mouth once daily, Disp: 90 tablet, Rfl: 0    acetaminophen (TYLENOL) 500 MG tablet, Take 1 tablet by mouth every 4 hours as needed for Pain or Fever, Disp: 20 tablet, Rfl: 0    Insulin Pen Needle (PEN NEEDLES) 31G X 6 MM MISC, 1 each by Does not apply route 3 times daily E11.9 Z79.4, Disp: 100 each, Rfl: 11    omeprazole (PRILOSEC) 40 MG delayed release capsule, Take 1 capsule by mouth daily, Disp: 90 capsule, Rfl: 3    SITagliptin (JANUVIA) 100 MG tablet, take 1 tablet by mouth once daily, Disp: 90 tablet, Rfl: 3    LANTUS SOLOSTAR 100 UNIT/ML injection pen, Inject 120 Units into the skin nightly (Patient taking differently: Inject 130 Units into the skin nightly ), Disp: 10 pen, Rfl: 11    FLUoxetine (PROZAC) 20 MG capsule, Take 1 capsule by mouth daily, Disp: 90 capsule, Rfl: 3    FLUoxetine (PROZAC) 40 MG capsule, Take 1 capsule by mouth daily, Disp: 90 capsule, Rfl: 3    Multiple Vitamins-Minerals (THERAPEUTIC MULTIVITAMIN-MINERALS) tablet, Take 1 tablet by mouth daily, Disp: , Rfl:     insulin lispro (HUMALOG KWIKPEN) 100 UNIT/ML pen, Inject 35-45 Units into the skin 3 times daily (before meals) (Patient taking differently: Inject 5-10 Units into the skin 3 times daily (before meals) ), Disp: 10 pen, Rfl: 0    The patient is allergic to penicillins, crestor [rosuvastatin], and contrast [gadolinium derivatives]. Past Medical History  Meena Reza  has a past medical history of Depression with anxiety, Diabetes mellitus ([address] GERD (gastroesophageal reflux disease), Hyperlipidemia, and Hypertension. Subjective:      Review of Systems   Constitutional: Positive for activity change, appetite change, chills, diaphoresis, fatigue and fever. Negative for weight loss. HENT: Positive for rhinorrhea and sore throat. Negative for congestion, ear pain, postnasal drip and trouble swallowing. Eyes: Negative for pain, discharge and redness. Respiratory: Positive for cough, shortness of breath and wheezing. Negative for hemoptysis. Cardiovascular: Negative. Negative for chest pain. Gastrointestinal: Negative for abdominal pain, constipation, diarrhea, heartburn, nausea and vomiting. Endocrine: Negative. Genitourinary: Negative for dysuria, frequency and urgency. Musculoskeletal: Positive for myalgias. Negative for arthralgias and back pain. Skin: Negative for rash. Allergic/Immunologic: Negative. Negative for environmental allergies. Neurological: Positive for headaches. Negative for dizziness, tremors and weakness. Hematological: Negative. Psychiatric/Behavioral: Negative for dysphoric mood and sleep disturbance. The patient is not nervous/anxious. Objective:     /74   Pulse 68   Temp 98.5 °F (36.9 °C) (Oral)   Resp 20   Ht 5' 7.5\" (1.715 m)   Wt 213 lb (96.6 kg)   SpO2 97%   BMI 32.87 kg/m²     Physical Exam  Vitals reviewed. Constitutional:       General: He is not in acute distress. Appearance: Normal appearance. He is well-developed. He is ill-appearing. He is not toxic-appearing or diaphoretic. HENT:      Head: Normocephalic. No right periorbital erythema or left periorbital erythema. Jaw: No trismus. Right Ear: Hearing, tympanic membrane, ear canal and external ear normal.      Left Ear: Hearing, tympanic membrane, ear canal and external ear normal.      Nose: Congestion and rhinorrhea present. No mucosal edema. Mouth/Throat:      Mouth: Mucous membranes are moist.      Dentition: Normal dentition. Pharynx: Uvula midline. No posterior oropharyngeal erythema. Tonsils: No tonsillar exudate. 0 on the right. 0 on the left. Eyes:      General: Lids are normal.         Right eye: No discharge. fever.    Diagnoses and all orders for this visit:    URI with cough and congestion  -     COVID-19; Future  -     levoFLOXacin (LEVAQUIN) 500 MG tablet; Take 1 tablet by mouth daily for 7 days  -     guaiFENesin-codeine (TUSSI-ORGANIDIN NR) 100-10 MG/5ML syrup; Take 5 mLs by mouth 2 times daily as needed for Cough for up to 7 days. -     COVID-19    Patient is treated aggressively with Levaquin due to to his presentation which is likely a left-sided pneumonia, in the context of diabetes. Type 2 diabetes mellitus without complication, with long-term current use of insulin ([facility])  -     Cancel: POCT Glucose  -     POCT glycosylated hemoglobin (Hb A1C)  -     Continuous Blood Gluc Sensor (FREESTYLE VANESSA 14 DAY SENSOR) MISC; 1 Device by Does not apply route every 14 days    Other orders  -     COVID-19, Rapid    A1c is 8.5 today. Patient given sample freestyle vanessa sensor to start using to help monitor his possible blood sugars. Return in about 3 months (around 11/10/2021). Patient instructions given andreviewed.         Electronically signed by YAMILE Bolton CNP on 8/10/2021 at 3:52 PM

## 2021-08-12 LAB
SARS-COV-2: NOT DETECTED
SOURCE: NORMAL

## 2021-09-28 RX ORDER — INSULIN LISPRO 200 [IU]/ML
35-45 INJECTION, SOLUTION SUBCUTANEOUS
Qty: 10 PEN | Refills: 11 | Status: SHIPPED | OUTPATIENT
Start: 2021-09-28 | End: 2022-10-31

## 2021-10-18 ENCOUNTER — PATIENT MESSAGE (OUTPATIENT)
Dept: FAMILY MEDICINE CLINIC | Age: 56
End: 2021-10-18

## 2021-10-18 DIAGNOSIS — G47.33 OSA (OBSTRUCTIVE SLEEP APNEA): ICD-10-CM

## 2021-10-18 DIAGNOSIS — E11.9 TYPE 2 DIABETES MELLITUS WITHOUT COMPLICATION, WITH LONG-TERM CURRENT USE OF INSULIN (HCC): Primary | ICD-10-CM

## 2021-10-18 DIAGNOSIS — Z79.4 TYPE 2 DIABETES MELLITUS WITHOUT COMPLICATION, WITH LONG-TERM CURRENT USE OF INSULIN (HCC): Primary | ICD-10-CM

## 2021-10-18 RX ORDER — BLOOD-GLUCOSE TRANSMITTER
EACH MISCELLANEOUS
Qty: 1 EACH | Refills: 3 | Status: SHIPPED | OUTPATIENT
Start: 2021-10-18 | End: 2021-12-20

## 2021-10-18 RX ORDER — LISINOPRIL 10 MG/1
TABLET ORAL
Qty: 90 TABLET | Refills: 1 | Status: SHIPPED | OUTPATIENT
Start: 2021-10-18 | End: 2021-12-20 | Stop reason: SDUPTHER

## 2021-10-18 RX ORDER — BLOOD-GLUCOSE SENSOR
EACH MISCELLANEOUS
Qty: 3 EACH | Refills: 11 | Status: SHIPPED | OUTPATIENT
Start: 2021-10-18 | End: 2021-12-20

## 2021-10-18 RX ORDER — BLOOD-GLUCOSE,RECEIVER,CONT
EACH MISCELLANEOUS
Qty: 1 EACH | Refills: 0 | Status: SHIPPED | OUTPATIENT
Start: 2021-10-18 | End: 2021-12-20 | Stop reason: ALTCHOICE

## 2021-10-18 NOTE — TELEPHONE ENCOUNTER
From: Odette Woodson  Sent: 10/18/2021 12:32 PM EDT  To: [facility]  Subject: RE: Visit Follow-Up Question    I believe it was dr Amador Angelo back in 2008. The pharmacy is [facility] on [address]    Thank you    ----- Message -----  From: Chauncey Suero  Sent: 10/18/21, 10:39  To: Odette Woodson  Subject: RE: Visit Follow-Up Question    Vidal Maradiaga,     What pulmonologist prescribed the CPAP machine for you previously? What pharmacy would you like the Dexcom6 sent to? [facility]      ----- Message -----   From:Arnav Lambert   Sent:10/18/2021 10:33 AM EDT   To:GAIL SAAVEDRA, YAMILE - CNP   Subject:Visit Follow-Up Question    I have two questions    1. Can Beatrice Swanson or Ab prescribe the dexcon6 glucose monitor instead of the free style. I have knocked 2 sensors off since starting. I would like to have one where I can place on the abdomen instead of the arm      2. I would like to have a sleep study completed to get a new CpAP. Do you know physician that can order the sleep study and prescribe a new CpAP machine. Thanks for your time it is greatly appreciated.     Kael Linton

## 2021-10-25 RX ORDER — FLUOXETINE HYDROCHLORIDE 40 MG/1
CAPSULE ORAL
Qty: 90 CAPSULE | Refills: 2 | Status: SHIPPED | OUTPATIENT
Start: 2021-10-25 | End: 2022-02-21 | Stop reason: DRUGHIGH

## 2021-12-07 DIAGNOSIS — E11.9 TYPE 2 DIABETES MELLITUS WITHOUT COMPLICATION, WITH LONG-TERM CURRENT USE OF INSULIN (HCC): Chronic | ICD-10-CM

## 2021-12-07 DIAGNOSIS — Z79.4 TYPE 2 DIABETES MELLITUS WITHOUT COMPLICATION, WITH LONG-TERM CURRENT USE OF INSULIN (HCC): Chronic | ICD-10-CM

## 2021-12-07 RX ORDER — FLASH GLUCOSE SENSOR
KIT MISCELLANEOUS
Qty: 2 EACH | Refills: 5 | Status: SHIPPED | OUTPATIENT
Start: 2021-12-07 | End: 2022-06-28

## 2021-12-16 RX ORDER — OMEPRAZOLE 40 MG/1
40 CAPSULE, DELAYED RELEASE ORAL DAILY
Qty: 90 CAPSULE | Refills: 3 | Status: SHIPPED | OUTPATIENT
Start: 2021-12-16

## 2021-12-16 RX ORDER — FLUOXETINE HYDROCHLORIDE 20 MG/1
20 CAPSULE ORAL DAILY
Qty: 90 CAPSULE | Refills: 3 | Status: SHIPPED | OUTPATIENT
Start: 2021-12-16 | End: 2022-02-21 | Stop reason: DRUGHIGH

## 2021-12-16 RX ORDER — DAPAGLIFLOZIN 10 MG/1
TABLET, FILM COATED ORAL
Qty: 90 TABLET | Refills: 1 | Status: SHIPPED | OUTPATIENT
Start: 2021-12-16 | End: 2022-06-20

## 2021-12-16 NOTE — TELEPHONE ENCOUNTER
Please approve or deny     Last Visit Date:  8/10/2021       Next Visit Date:    Visit date not found

## 2021-12-20 ENCOUNTER — OFFICE VISIT (OUTPATIENT)
Dept: FAMILY MEDICINE CLINIC | Age: 56
End: 2021-12-20
Payer: COMMERCIAL

## 2021-12-20 VITALS
SYSTOLIC BLOOD PRESSURE: 132 MMHG | HEART RATE: 68 BPM | TEMPERATURE: 98.2 F | DIASTOLIC BLOOD PRESSURE: 74 MMHG | BODY MASS INDEX: 33.58 KG/M2 | WEIGHT: 217.6 LBS | OXYGEN SATURATION: 98 % | RESPIRATION RATE: 16 BRPM

## 2021-12-20 DIAGNOSIS — K57.92 ACUTE DIVERTICULITIS: ICD-10-CM

## 2021-12-20 DIAGNOSIS — E11.9 TYPE 2 DIABETES MELLITUS WITHOUT COMPLICATION, WITH LONG-TERM CURRENT USE OF INSULIN (HCC): ICD-10-CM

## 2021-12-20 DIAGNOSIS — Z79.4 TYPE 2 DIABETES MELLITUS WITHOUT COMPLICATION, WITH LONG-TERM CURRENT USE OF INSULIN (HCC): ICD-10-CM

## 2021-12-20 DIAGNOSIS — I10 ESSENTIAL HYPERTENSION: ICD-10-CM

## 2021-12-20 DIAGNOSIS — Z00.00 ANNUAL PHYSICAL EXAM: Primary | ICD-10-CM

## 2021-12-20 LAB — HBA1C MFR BLD: 6.9 %

## 2021-12-20 PROCEDURE — 83036 HEMOGLOBIN GLYCOSYLATED A1C: CPT | Performed by: NURSE PRACTITIONER

## 2021-12-20 PROCEDURE — G8484 FLU IMMUNIZE NO ADMIN: HCPCS | Performed by: NURSE PRACTITIONER

## 2021-12-20 PROCEDURE — 99396 PREV VISIT EST AGE 40-64: CPT | Performed by: NURSE PRACTITIONER

## 2021-12-20 RX ORDER — DICYCLOMINE HYDROCHLORIDE 10 MG/1
10 CAPSULE ORAL 4 TIMES DAILY
Qty: 40 CAPSULE | Refills: 0 | Status: SHIPPED | OUTPATIENT
Start: 2021-12-20 | End: 2022-07-29

## 2021-12-20 RX ORDER — CIPROFLOXACIN 500 MG/1
500 TABLET, FILM COATED ORAL 2 TIMES DAILY
Qty: 10 TABLET | Refills: 0 | Status: SHIPPED | OUTPATIENT
Start: 2021-12-20 | End: 2021-12-25

## 2021-12-20 RX ORDER — METRONIDAZOLE 500 MG/1
500 TABLET ORAL 3 TIMES DAILY
Qty: 30 TABLET | Refills: 0 | Status: SHIPPED | OUTPATIENT
Start: 2021-12-20 | End: 2021-12-30

## 2021-12-20 RX ORDER — INSULIN GLARGINE 100 [IU]/ML
130 INJECTION, SOLUTION SUBCUTANEOUS NIGHTLY
Qty: 10 PEN | Refills: 5 | Status: SHIPPED | OUTPATIENT
Start: 2021-12-20 | End: 2022-09-28 | Stop reason: SDUPTHER

## 2021-12-20 RX ORDER — LISINOPRIL 10 MG/1
TABLET ORAL
Qty: 90 TABLET | Refills: 3 | Status: SHIPPED | OUTPATIENT
Start: 2021-12-20

## 2021-12-20 ASSESSMENT — ENCOUNTER SYMPTOMS
WHEEZING: 0
BACK PAIN: 0
TROUBLE SWALLOWING: 0
EYE REDNESS: 0
SHORTNESS OF BREATH: 0
RHINORRHEA: 0
DIARRHEA: 1
EYE PAIN: 0
EYE DISCHARGE: 0
VOMITING: 0
CONSTIPATION: 0
COUGH: 0
ABDOMINAL PAIN: 1
ALLERGIC/IMMUNOLOGIC NEGATIVE: 1
SORE THROAT: 0
ABDOMINAL DISTENTION: 1
NAUSEA: 1

## 2021-12-20 NOTE — PROGRESS NOTES
2021    Mahnaz Hunt (:  1965) is a 60 y.o. male, here for a preventive medicine evaluation. Patient experiencing a flareup of his diverticulitis over the last 3 days with acute cramping, significant diarrhea, nausea, abdominal pain, excessive gas and belching. Pain is located in the left upper quadrant as has been typical with his last 2 exacerbations. Last episode of diverticulitis was about 1 year ago successfully treated with Cipro Flagyl and Bentyl. Patient due for A1c today. Patient Active Problem List   Diagnosis    GERD (gastroesophageal reflux disease)    Hypertension    Hyperlipidemia    Diabetes mellitus ([facility] [address]    Depression with anxiety       Review of Systems   Constitutional: Negative for activity change, fatigue and fever. HENT: Negative for congestion, ear pain, rhinorrhea, sore throat and trouble swallowing. Eyes: Negative for pain, discharge and redness. Respiratory: Negative for cough, shortness of breath and wheezing. Cardiovascular: Negative. Gastrointestinal: Positive for abdominal distention, abdominal pain, diarrhea and nausea. Negative for constipation and vomiting. Endocrine: Negative. Genitourinary: Negative for dysuria, frequency and urgency. Musculoskeletal: Negative for arthralgias, back pain and myalgias. Skin: Negative for rash. Allergic/Immunologic: Negative. Neurological: Negative for dizziness, tremors, weakness and headaches. Hematological: Negative. Psychiatric/Behavioral: Negative for dysphoric mood and sleep disturbance. The patient is not nervous/anxious. Prior to Visit Medications    Medication Sig Taking?  Authorizing Provider   Cholecalciferol (VITAMIN D3 PO) Take 1 tablet by mouth daily Yes Historical Provider, MD   ZINC PO Take 1 tablet by mouth daily Yes Historical Provider, MD   lisinopril (PRINIVIL;ZESTRIL) 10 MG tablet take 1 tablet by mouth once daily Yes Phan Larry APRN - NAHID LOZANO SOLOSTAR 100 UNIT/ML injection pen Inject 130 Units into the skin nightly Yes YAMILE Moe CNP   metroNIDAZOLE (FLAGYL) 500 MG tablet Take 1 tablet by mouth 3 times daily for 10 days Yes YAMILE Moe CNP   ciprofloxacin (CIPRO) 500 MG tablet Take 1 tablet by mouth 2 times daily for 5 days Yes YAMILE Moe CNP   dicyclomine (BENTYL) 10 MG capsule Take 1 capsule by mouth 4 times daily for 10 days Yes YAMILE Moe CNP   FARXIGA 10 MG tablet take 1 tablet by mouth every morning Yes YAMILE Ramos CNP   SITagliptin (JANUVIA) 100 MG tablet take 1 tablet by mouth once daily Yes YAMILE Ramos CNP   FLUoxetine (PROZAC) 20 MG capsule take 1 capsule by mouth daily Yes YAMILE Ramos CNP   omeprazole (PRILOSEC) 40 MG delayed release capsule take 1 capsule by mouth daily Yes YAMILE Ramos CNP   Continuous Blood Gluc Sensor (ConverserSTYLE VANESSA 14 DAY SENSOR) MISC apply 1 SENSOR to back OF UPPER ARM REMOVE AND REPLACE every 14 days use with DEVICE to MONITOR BLOOD SUGAR Yes Anoop Gannon MD   FLUoxetine (PROZAC) 40 MG capsule take 1 capsule by mouth once daily Yes Anoop Gannon MD   insulin lispro (HUMALOG KWIKPEN) 200 UNIT/ML SOPN pen Inject 35-45 Units into the skin 3 times daily (with meals) Yes Anoop Gannon MD   atorvastatin (LIPITOR) 40 MG tablet take 1 tablet by mouth once daily Yes YAMILE Ramos CNP   Insulin Pen Needle (PEN NEEDLES) 31G X 6 MM MISC 1 each by Does not apply route 3 times daily E11.9 Z79.4 Yes YAMILE Ramos CNP   acetaminophen (TYLENOL) 500 MG tablet Take 1 tablet by mouth every 4 hours as needed for Pain or Fever  YAMILE Castaneda CNP        Allergies   Allergen Reactions    Penicillins Anaphylaxis    Contrast [Gadolinium Derivatives] Rash     Red Man Syndrome       Past Medical History:   Diagnosis Date    Depression with anxiety     Diabetes mellitus ([facility] [address]     GERD (gastroesophageal reflux disease)     Hyperlipidemia     Hypertension        Past Surgical History:   Procedure Laterality Date    BREAST SURGERY  1996    fatty tissue left breat benign    CARDIAC CATHETERIZATION      hernandez    SHOULDER SURGERY  2017    SHOULDER SURGERY  2020        Right shoulder arthroscopy cam procedure extensive debridement, BT, CTR, ulnar nerve decompression                      TONSILLECTOMY  1973    addenoidectomy       Social History     Socioeconomic History    Marital status:      Spouse name: Not on file    Number of children: Not on file    Years of education: Not on file    Highest education level: Not on file   Occupational History    Not on file   Tobacco Use    Smoking status: Former Smoker     Packs/day: 1.00     Years: 25.00     Pack years: 25.00     Types: Cigarettes     Start date: 1980     Quit date: 2005     Years since quittin.4    Smokeless tobacco: Never Used   Vaping Use    Vaping Use: Never used   Substance and Sexual Activity    Alcohol use: Yes     Comment: socially wine    Drug use: Never    Sexual activity: Not on file   Other Topics Concern    Not on file   Social History Narrative    Not on file     Social Determinants of Health     Financial Resource Strain: Low Risk     Difficulty of Paying Living Expenses: Not hard at all   Food Insecurity: No Food Insecurity    Worried About [facility] in the Last Year: Never true    [facility] in the Last Year: Never true   Transportation Needs:     Lack of Transportation (Medical): Not on file    Lack of Transportation (Non-Medical):  Not on file   Physical Activity:     Days of Exercise per Week: Not on file    Minutes of Exercise per Session: Not on file   Stress:     Feeling of Stress : Not on file   Social Connections:     Frequency of Communication with Friends and Family: Not on file    Frequency of Social Gatherings with Friends and Family: Not on file    Attends [employer] Services: Not on file    Active Member of Clubs or Organizations: Not on file    Attends Club or Organization Meetings: Not on file    Marital Status: Not on file   Intimate Partner Violence:     Fear of Current or Ex-Partner: Not on file    Emotionally Abused: Not on file    Physically Abused: Not on file    Sexually Abused: Not on file   Housing Stability:     Unable to Pay for Housing in the Last Year: Not on file    Number of [place] in the Last Year: Not on file    Unstable Housing in the Last Year: Not on file        History reviewed. No pertinent family history. ADVANCE DIRECTIVE: N, <no information>    Vitals:    12/20/21 1545   BP: 132/74   Site: Right Upper Arm   Pulse: 68   Resp: 16   Temp: 98.2 °F (36.8 °C)   TempSrc: Oral   SpO2: 98%   Weight: 217 lb 9.6 oz (98.7 kg)     Estimated body mass index is 33.58 kg/m² as calculated from the following:    Height as of 8/10/21: 5' 7.5\" (1.715 m). Weight as of this encounter: 217 lb 9.6 oz (98.7 kg). Physical Exam    No flowsheet data found.     Lab Results   Component Value Date    CHOL 194 10/02/2019    CHOL 153 02/14/2019    CHOL 155 11/19/2018    CHOL 160 07/28/2011    TRIG 119 10/02/2019    TRIG 117 02/14/2019    TRIG 163 11/19/2018    HDL 37 10/02/2019    HDL 33 02/14/2019    HDL 36 11/19/2018    LDLCALC 133 10/02/2019    LDLCALC 100 07/28/2011    GLUCOSE 150 06/29/2020    GLUCOSE 90 04/11/2019    LABA1C 6.9 12/20/2021    LABA1C 8.5 08/10/2021    LABA1C 6.8 10/02/2019       The 10-year ASCVD risk score (Sarah Barahona et al., 2013) is: 20.1%    Values used to calculate the score:      Age: 60 years      Sex: Male      Is Non- : No      Diabetic: Yes      Tobacco smoker: No      Systolic Blood Pressure: 127 mmHg      Is BP treated: Yes      HDL Cholesterol: 37 mg/dL      Total Cholesterol: 194 mg/dL    Immunization History   Administered Date(s) Administered    Influenza A (E2G8-58) Vaccine PF IM 11/03/2009, 11/03/2009    Influenza Virus Vaccine 09/30/2018, 09/30/2018, 10/02/2019, 10/02/2019    Pneumococcal Conjugate 13-valent Rosa Frank) 10/02/2019       Health Maintenance   Topic Date Due    COVID-19 Vaccine (1) Never done    Diabetic microalbuminuria test  Never done    Hepatitis B vaccine (1 of 3 - Risk 3-dose series) Never done    DTaP/Tdap/Td vaccine (1 - Tdap) Never done    Shingles Vaccine (1 of 2) Never done    Pneumococcal 0-64 years Vaccine (1 of 2 - PPSV23) 11/27/2019    Lipid screen  10/02/2020    Diabetic retinal exam  01/08/2021    Potassium monitoring  06/29/2021    Creatinine monitoring  06/29/2021    Flu vaccine (1) 09/01/2021    Diabetic foot exam  12/15/2021    A1C test (Diabetic or Prediabetic)  12/20/2022    Colon cancer screen colonoscopy  05/10/2029    Hepatitis A vaccine  Aged Out    Hib vaccine  Aged Out    Meningococcal (ACWY) vaccine  Aged Out    Hepatitis C screen  Discontinued    HIV screen  Discontinued          ASSESSMENT/PLAN:  1. Annual physical exam  -     Basic Metabolic Panel; Future  -     CBC Auto Differential; Future  -     Lipid, Fasting; Future  -     Hepatic Function Panel; Future  -     Microalbumin / Creatinine Urine Ratio; Future  2. Type 2 diabetes mellitus without complication, with long-term current use of insulin (HCC)  -     POCT glycosylated hemoglobin (Hb A1C)  -     LANTUS SOLOSTAR 100 UNIT/ML injection pen; Inject 130 Units into the skin nightly, Disp-10 pen, R-5, DAWNormal  -     Glutamic Acid Decarboxylase; Future  3. Essential hypertension  -     lisinopril (PRINIVIL;ZESTRIL) 10 MG tablet; take 1 tablet by mouth once daily, Disp-90 tablet, R-3Normal  4. Acute diverticulitis  -     metroNIDAZOLE (FLAGYL) 500 MG tablet; Take 1 tablet by mouth 3 times daily for 10 days, Disp-30 tablet, R-0Normal  -     ciprofloxacin (CIPRO) 500 MG tablet;  Take 1 tablet by mouth 2 times daily for 5 days, Disp-10 tablet, R-0Normal  -     dicyclomine (BENTYL) 10 MG

## 2022-01-25 ENCOUNTER — OFFICE VISIT (OUTPATIENT)
Dept: FAMILY MEDICINE CLINIC | Age: 57
End: 2022-01-25
Payer: COMMERCIAL

## 2022-01-25 DIAGNOSIS — G47.00 INSOMNIA, UNSPECIFIED TYPE: ICD-10-CM

## 2022-01-25 DIAGNOSIS — F43.10 PTSD (POST-TRAUMATIC STRESS DISORDER): ICD-10-CM

## 2022-01-25 DIAGNOSIS — F41.8 DEPRESSION WITH ANXIETY: Primary | Chronic | ICD-10-CM

## 2022-01-25 PROCEDURE — G8417 CALC BMI ABV UP PARAM F/U: HCPCS | Performed by: NURSE PRACTITIONER

## 2022-01-25 PROCEDURE — G8484 FLU IMMUNIZE NO ADMIN: HCPCS | Performed by: NURSE PRACTITIONER

## 2022-01-25 PROCEDURE — 99214 OFFICE O/P EST MOD 30 MIN: CPT | Performed by: NURSE PRACTITIONER

## 2022-01-25 PROCEDURE — G8427 DOCREV CUR MEDS BY ELIG CLIN: HCPCS | Performed by: NURSE PRACTITIONER

## 2022-01-25 PROCEDURE — 1036F TOBACCO NON-USER: CPT | Performed by: NURSE PRACTITIONER

## 2022-01-25 PROCEDURE — 3017F COLORECTAL CA SCREEN DOC REV: CPT | Performed by: NURSE PRACTITIONER

## 2022-01-25 RX ORDER — VENLAFAXINE HYDROCHLORIDE 37.5 MG/1
37.5 CAPSULE, EXTENDED RELEASE ORAL DAILY
Qty: 30 CAPSULE | Refills: 3 | Status: SHIPPED | OUTPATIENT
Start: 2022-01-25 | End: 2022-02-21 | Stop reason: ALTCHOICE

## 2022-01-25 RX ORDER — PEN NEEDLE, DIABETIC 31 G X1/4"
1 NEEDLE, DISPOSABLE MISCELLANEOUS 3 TIMES DAILY
Qty: 100 EACH | Refills: 11 | Status: SHIPPED | OUTPATIENT
Start: 2022-01-25 | End: 2022-09-28 | Stop reason: SDUPTHER

## 2022-01-25 ASSESSMENT — PATIENT HEALTH QUESTIONNAIRE - PHQ9
6. FEELING BAD ABOUT YOURSELF - OR THAT YOU ARE A FAILURE OR HAVE LET YOURSELF OR YOUR FAMILY DOWN: 2
7. TROUBLE CONCENTRATING ON THINGS, SUCH AS READING THE NEWSPAPER OR WATCHING TELEVISION: 2
SUM OF ALL RESPONSES TO PHQ QUESTIONS 1-9: 14
SUM OF ALL RESPONSES TO PHQ QUESTIONS 1-9: 16
9. THOUGHTS THAT YOU WOULD BE BETTER OFF DEAD, OR OF HURTING YOURSELF: 2
8. MOVING OR SPEAKING SO SLOWLY THAT OTHER PEOPLE COULD HAVE NOTICED. OR THE OPPOSITE, BEING SO FIGETY OR RESTLESS THAT YOU HAVE BEEN MOVING AROUND A LOT MORE THAN USUAL: 0
SUM OF ALL RESPONSES TO PHQ9 QUESTIONS 1 & 2: 6
10. IF YOU CHECKED OFF ANY PROBLEMS, HOW DIFFICULT HAVE THESE PROBLEMS MADE IT FOR YOU TO DO YOUR WORK, TAKE CARE OF THINGS AT HOME, OR GET ALONG WITH OTHER PEOPLE: 1
4. FEELING TIRED OR HAVING LITTLE ENERGY: 2
SUM OF ALL RESPONSES TO PHQ QUESTIONS 1-9: 16
3. TROUBLE FALLING OR STAYING ASLEEP: 2
1. LITTLE INTEREST OR PLEASURE IN DOING THINGS: 3
SUM OF ALL RESPONSES TO PHQ QUESTIONS 1-9: 16
2. FEELING DOWN, DEPRESSED OR HOPELESS: 3
5. POOR APPETITE OR OVEREATING: 0

## 2022-01-25 ASSESSMENT — ENCOUNTER SYMPTOMS
WHEEZING: 0
EYE PAIN: 0
BACK PAIN: 0
NAUSEA: 0
SORE THROAT: 0
TROUBLE SWALLOWING: 0
EYE DISCHARGE: 0
CONSTIPATION: 0
RHINORRHEA: 0
DIARRHEA: 0
COUGH: 0
VOMITING: 0
ABDOMINAL PAIN: 0
ALLERGIC/IMMUNOLOGIC NEGATIVE: 1
SHORTNESS OF BREATH: 0
EYE REDNESS: 0

## 2022-01-25 ASSESSMENT — COLUMBIA-SUICIDE SEVERITY RATING SCALE - C-SSRS
1. WITHIN THE PAST MONTH, HAVE YOU WISHED YOU WERE DEAD OR WISHED YOU COULD GO TO SLEEP AND NOT WAKE UP?: YES
5. HAVE YOU STARTED TO WORK OUT OR WORKED OUT THE DETAILS OF HOW TO KILL YOURSELF? DO YOU INTEND TO CARRY OUT THIS PLAN?: NO
3. HAVE YOU BEEN THINKING ABOUT HOW YOU MIGHT KILL YOURSELF?: YES
6. HAVE YOU EVER DONE ANYTHING, STARTED TO DO ANYTHING, OR PREPARED TO DO ANYTHING TO END YOUR LIFE?: NO
2. HAVE YOU ACTUALLY HAD ANY THOUGHTS OF KILLING YOURSELF?: YES
4. HAVE YOU HAD THESE THOUGHTS AND HAD SOME INTENTION OF ACTING ON THEM?: YES

## 2022-01-25 NOTE — PATIENT INSTRUCTIONS
Patient Education        Learning About Anxiety Disorders  What are anxiety disorders? Anxiety disorders are a type of medical problem. They cause severe anxiety. When you feel anxious, you feel that something bad is about to happen. This feeling interferes with your life. These disorders include:  · Generalized anxiety disorder. You feel worried and stressed about many everyday events and activities. This goes on for several months and disrupts your life on most days. · Panic disorder. You have repeated panic attacks. A panic attack is a sudden, intense fear or anxiety. It may make you feel short of breath. Your heart may pound. · Social anxiety disorder. You feel very anxious about what you will say or do in front of people. For example, you may be scared to talk or eat in public. This problem affects your daily life. · Phobias. You are very scared of a specific object, situation, or activity. For example, you may fear spiders, high places, or small spaces. What are the symptoms? Generalized anxiety disorder  Symptoms may include:  · Feeling worried and stressed about many things almost every day. · Feeling tired or irritable. You may have a hard time concentrating. · Having headaches or muscle aches. · Having a hard time getting to sleep or staying asleep. Panic disorder  You may have repeated panic attacks when there is no reason for feeling afraid. You may change your daily activities because you worry that you will have another attack. Symptoms may include:  · Intense fear, terror, or anxiety. · Trouble breathing or very fast breathing. · Chest pain or tightness. · A heartbeat that races or is not regular. Social anxiety disorder  Symptoms may include:  · Fear about a social situation, such as eating in front of others or speaking in public. You may worry a lot. Or you may be afraid that something bad will happen. · Anxiety that can cause you to blush, sweat, and feel shaky.   · A heartbeat that is faster than normal.  · A hard time focusing. Phobias  Symptoms may include:  · More fear than most people of being around an object, being in a situation, or doing an activity. You might also be stressed about the chance of being around the thing you fear. · Worry about losing control, panicking, fainting, or having physical symptoms like a faster heartbeat when you are around the situation or object. How are these disorders treated? Anxiety disorders can be treated with medicines or counseling. A combination of both may be used. Medicines may include:  · Antidepressants. These may help your symptoms by keeping chemicals in your brain in balance. · Benzodiazepines. These may give you short-term relief of your symptoms. Some people use cognitive-behavioral therapy. A therapist helps you learn to change stressful or bad thoughts into helpful thoughts. Lead a healthy lifestyle  A healthy lifestyle may help you feel better. · Get at least 30 minutes of exercise on most days of the week. Walking is a good choice. · Eat a healthy diet. Include fruits, vegetables, lean proteins, and whole grains in your diet each day. · Try to go to bed at the same time every night. Try for 8 hours of sleep a night. · Find ways to manage stress. Try relaxation exercises. · Avoid alcohol and illegal drugs. Follow-up care is a key part of your treatment and safety. Be sure to make and go to all appointments, and call your doctor if you are having problems. It's also a good idea to know your test results and keep a list of the medicines you take. Where can you learn more? Go to [email or web address]://[email or web address]. org and sign in to your [employer] account. Enter Q634 in the [employer] box to learn more about \"Learning About Anxiety Disorders. \"     If you do not have an account, please click on the \"Sign Up Now\" link.   Current as of: June 16, 2021               Content Version: 13.1  © 3892-1446 Healthwise, Incorporated. Care instructions adapted under license by [facility] ([facility]). If you have questions about a medical condition or this instruction, always ask your healthcare professional. [address] any warranty or liability for your use of this information.

## 2022-01-25 NOTE — PROGRESS NOTES
[address] [address] [address]  [facility] [address]  Dept: [phone]  Dept Fax: [phone]  Loc: [phone]     Visit Date:  1/25/2022      Patient:  Zander Gomez  YOB: 1965    HPI:     Chief Complaint   Patient presents with    Discuss Medications     increasing zoloft       Patient states he is having an exacerbation of his anxiety, self-harm ideations and PTSD with associated hypervigilance. He did stop for his appointment after work today so it is not interrupting his ADLs at this point. He states this has been going on for a couple of weeks and he wants to increase his antidepressant medication if possible. States he does not feel he needs admitted for any treatment but he has involved his family and his safety plan as he does. States there were no precipitating events but he is under a lot of pressure regarding his living situation and having to find a new home to live in.       Medications    Current Outpatient Medications:     venlafaxine (EFFEXOR XR) 37.5 MG extended release capsule, Take 1 capsule by mouth daily, Disp: 30 capsule, Rfl: 3    Cholecalciferol (VITAMIN D3 PO), Take 1 tablet by mouth daily, Disp: , Rfl:     ZINC PO, Take 1 tablet by mouth daily, Disp: , Rfl:     lisinopril (PRINIVIL;ZESTRIL) 10 MG tablet, take 1 tablet by mouth once daily, Disp: 90 tablet, Rfl: 3    LANTUS SOLOSTAR 100 UNIT/ML injection pen, Inject 130 Units into the skin nightly, Disp: 10 pen, Rfl: 5    dicyclomine (BENTYL) 10 MG capsule, Take 1 capsule by mouth 4 times daily for 10 days, Disp: 40 capsule, Rfl: 0    FARXIGA 10 MG tablet, take 1 tablet by mouth every morning, Disp: 90 tablet, Rfl: 1    SITagliptin (JANUVIA) 100 MG tablet, take 1 tablet by mouth once daily, Disp: 90 tablet, Rfl: 3    FLUoxetine (PROZAC) 20 MG capsule, take 1 capsule by mouth daily, Disp: 90 capsule, Rfl: 3    omeprazole (PRILOSEC) 40 MG delayed release capsule, take 1 capsule by mouth daily, Disp: 90 capsule, Rfl: 3    Continuous Blood Gluc Sensor (FREESTYLE VANESSA 14 DAY SENSOR) MISC, apply 1 SENSOR to back OF UPPER ARM REMOVE AND REPLACE every 14 days use with DEVICE to MONITOR BLOOD SUGAR, Disp: 2 each, Rfl: 5    FLUoxetine (PROZAC) 40 MG capsule, take 1 capsule by mouth once daily, Disp: 90 capsule, Rfl: 2    insulin lispro (HUMALOG KWIKPEN) 200 UNIT/ML SOPN pen, Inject 35-45 Units into the skin 3 times daily (with meals), Disp: 10 pen, Rfl: 11    atorvastatin (LIPITOR) 40 MG tablet, take 1 tablet by mouth once daily, Disp: 90 tablet, Rfl: 0    acetaminophen (TYLENOL) 500 MG tablet, Take 1 tablet by mouth every 4 hours as needed for Pain or Fever, Disp: 20 tablet, Rfl: 0    Insulin Pen Needle (PEN NEEDLES) 31G X 6 MM MISC, 1 each by Does not apply route 3 times daily E11.9 Z79.4, Disp: 100 each, Rfl: 11    The patient is allergic to penicillins and contrast [gadolinium derivatives]. Past Medical History  Bulan  has a past medical history of Depression with anxiety, Diabetes mellitus ([address] GERD (gastroesophageal reflux disease), Hyperlipidemia, and Hypertension. Subjective:      Review of Systems   Constitutional: Negative for activity change, fatigue and fever. HENT: Negative for congestion, ear pain, rhinorrhea, sore throat and trouble swallowing. Eyes: Negative for pain, discharge and redness. Respiratory: Negative for cough, shortness of breath and wheezing. Cardiovascular: Negative. Gastrointestinal: Negative for abdominal pain, constipation, diarrhea, nausea and vomiting. Endocrine: Negative. Genitourinary: Negative for dysuria, frequency and urgency. Musculoskeletal: Negative for arthralgias, back pain and myalgias. Skin: Negative for rash. Allergic/Immunologic: Negative. Neurological: Negative for dizziness, tremors, weakness and headaches. Hematological: Negative.     Psychiatric/Behavioral: Positive for dysphoric mood, sleep disturbance and suicidal ideas. The patient is nervous/anxious. Objective: There were no vitals taken for this visit. Physical Exam  Constitutional:       General: He is not in acute distress. Appearance: He is well-developed. He is not diaphoretic. HENT:      Right Ear: External ear normal.      Left Ear: External ear normal.      Nose: Nose normal.   Eyes:      General:         Right eye: No discharge. Left eye: No discharge. Conjunctiva/sclera: Conjunctivae normal.      Pupils: Pupils are equal, round, and reactive to light. Neck:      Vascular: No JVD. Cardiovascular:      Rate and Rhythm: Normal rate and regular rhythm. Pulmonary:      Effort: Pulmonary effort is normal. No respiratory distress. Musculoskeletal:         General: No tenderness or deformity. Normal range of motion. Cervical back: Normal range of motion. Skin:     General: Skin is warm and dry. Capillary Refill: Capillary refill takes less than 2 seconds. Coloration: Skin is not pale. Findings: No erythema or rash. Neurological:      Mental Status: He is alert and oriented to person, place, and time. Coordination: Coordination normal.   Psychiatric:         Attention and Perception: Attention and perception normal.         Mood and Affect: Mood is depressed. Speech: Speech normal.         Behavior: Behavior normal.         Thought Content: Thought content includes suicidal ideation. Thought content does not include suicidal plan. Cognition and Memory: Cognition and memory normal.         Judgment: Judgment normal.         Assessment/Plan:      Makenzie Siegel was seen today for discuss medications. Diagnoses and all orders for this visit:    Depression with anxiety  -     venlafaxine (EFFEXOR XR) 37.5 MG extended release capsule;  Take 1 capsule by mouth daily    PTSD (post-traumatic stress disorder)    Insomnia, unspecified type    Patient has self-harm thoughts and ideas of suicide but no plan. He is able to recognize triggers such as social anxiety situations that he removes himself from or avoids. Talked with the patient about adding a second type of medication to combine an SSRI with SR and I and he is amenable to this. Started on Effexor today and he will communicate with us that he needs he has. I do not believe the patient urgently needs admitted. Return if symptoms worsen or fail to improve. Patient instructions given camacho.         Electronically signed by YAMILE Delgadillo CNP on 1/25/2022 at 4:04 PM

## 2022-02-21 ENCOUNTER — PATIENT MESSAGE (OUTPATIENT)
Dept: FAMILY MEDICINE CLINIC | Age: 57
End: 2022-02-21

## 2022-02-21 RX ORDER — FLUOXETINE HYDROCHLORIDE 40 MG/1
80 CAPSULE ORAL DAILY
Qty: 180 CAPSULE | Refills: 1 | Status: SHIPPED | OUTPATIENT
Start: 2022-02-21 | End: 2022-08-24

## 2022-02-21 NOTE — TELEPHONE ENCOUNTER
From: Juliana Carey  To: Emil Marc  Sent: 2/21/2022 9:35 AM EST  Subject: Change of medication    Good Morning. I am asking for Sharyle Matter to stop my effexor For I feel it is not working for me.  I request he increase my Prozac dose to 80 mg If he has any questions you may call me at [phone]

## 2022-02-21 NOTE — TELEPHONE ENCOUNTER
Okay for patient to double his current dosage of 40 mg a day to 80 mg a day, then put in for a refill at the 80 mg level.

## 2022-05-09 RX ORDER — ATORVASTATIN CALCIUM 40 MG/1
TABLET, FILM COATED ORAL
Qty: 90 TABLET | Refills: 1 | Status: SHIPPED | OUTPATIENT
Start: 2022-05-09 | End: 2022-10-31

## 2022-06-20 RX ORDER — DAPAGLIFLOZIN 10 MG/1
TABLET, FILM COATED ORAL
Qty: 90 TABLET | Refills: 1 | Status: SHIPPED | OUTPATIENT
Start: 2022-06-20 | End: 2022-09-16

## 2022-06-28 DIAGNOSIS — E11.9 TYPE 2 DIABETES MELLITUS WITHOUT COMPLICATION, WITH LONG-TERM CURRENT USE OF INSULIN (HCC): Chronic | ICD-10-CM

## 2022-06-28 DIAGNOSIS — Z79.4 TYPE 2 DIABETES MELLITUS WITHOUT COMPLICATION, WITH LONG-TERM CURRENT USE OF INSULIN (HCC): Chronic | ICD-10-CM

## 2022-06-28 RX ORDER — FLASH GLUCOSE SENSOR
KIT MISCELLANEOUS
Qty: 2 EACH | Refills: 5 | Status: SHIPPED | OUTPATIENT
Start: 2022-06-28

## 2022-07-29 ENCOUNTER — OFFICE VISIT (OUTPATIENT)
Dept: FAMILY MEDICINE CLINIC | Age: 57
End: 2022-07-29
Payer: COMMERCIAL

## 2022-07-29 VITALS
OXYGEN SATURATION: 95 % | SYSTOLIC BLOOD PRESSURE: 126 MMHG | WEIGHT: 215 LBS | TEMPERATURE: 98.6 F | BODY MASS INDEX: 33.18 KG/M2 | DIASTOLIC BLOOD PRESSURE: 60 MMHG | HEART RATE: 56 BPM | RESPIRATION RATE: 16 BRPM

## 2022-07-29 DIAGNOSIS — Z12.5 PROSTATE CANCER SCREENING: ICD-10-CM

## 2022-07-29 DIAGNOSIS — E11.9 TYPE 2 DIABETES MELLITUS WITHOUT COMPLICATION, WITH LONG-TERM CURRENT USE OF INSULIN (HCC): Primary | ICD-10-CM

## 2022-07-29 DIAGNOSIS — R53.83 OTHER FATIGUE: ICD-10-CM

## 2022-07-29 DIAGNOSIS — Z79.4 TYPE 2 DIABETES MELLITUS WITHOUT COMPLICATION, WITH LONG-TERM CURRENT USE OF INSULIN (HCC): Primary | ICD-10-CM

## 2022-07-29 LAB — HBA1C MFR BLD: 7.9 %

## 2022-07-29 PROCEDURE — 3051F HG A1C>EQUAL 7.0%<8.0%: CPT | Performed by: NURSE PRACTITIONER

## 2022-07-29 PROCEDURE — 99214 OFFICE O/P EST MOD 30 MIN: CPT | Performed by: NURSE PRACTITIONER

## 2022-07-29 PROCEDURE — 83036 HEMOGLOBIN GLYCOSYLATED A1C: CPT | Performed by: NURSE PRACTITIONER

## 2022-07-29 RX ORDER — FLASH GLUCOSE SENSOR
1 KIT MISCELLANEOUS
Qty: 2 EACH | Refills: 2 | Status: SHIPPED | OUTPATIENT
Start: 2022-07-29 | End: 2022-10-18

## 2022-07-29 RX ORDER — CYCLOBENZAPRINE HCL 10 MG
TABLET ORAL NIGHTLY PRN
COMMUNITY
Start: 2022-06-02

## 2022-07-29 ASSESSMENT — ENCOUNTER SYMPTOMS
EYE DISCHARGE: 0
VOMITING: 0
COUGH: 0
EYE REDNESS: 0
WHEEZING: 0
BACK PAIN: 0
EYE PAIN: 0
CONSTIPATION: 0
SORE THROAT: 0
NAUSEA: 0
SHORTNESS OF BREATH: 0
TROUBLE SWALLOWING: 0
ABDOMINAL PAIN: 0
ALLERGIC/IMMUNOLOGIC NEGATIVE: 1
DIARRHEA: 0
RHINORRHEA: 0

## 2022-07-29 NOTE — PROGRESS NOTES
[address] [address] [address]  [facility] [address]  Dept: [phone]  Dept Fax: [phone]  Loc: [phone]     Visit Date:  7/29/2022      Patient:  Antoinette Cantor  YOB: 1965    HPI:     Chief Complaint   Patient presents with    Memory Loss     And feels fatigue a lot- would like a CD4, T4, T3 andTestosterone level labs    Discuss Labs     States he had some labs done at [facility]- will call for results    Medication Refill     Would like Mateusz 2 sent in. (Updated device that sets off alarms)       Patient for 3-month diabetic follow-up and complaint of ongoing fatigue. States he is taking 2-hour naps during the day often going to bed early. States he does not believe it is associated with his sleep apnea issues. He does not have any night sweats or chills, fever, diarrhea. Denies ED issues. Still struggling with his blood sugar and needs A1c today.       Medications    Current Outpatient Medications:     cyclobenzaprine (FLEXERIL) 10 MG tablet, TAKE 1 TABLET BY MOUTH AT NIGHT, Disp: , Rfl:     Continuous Blood Gluc Sensor (FREESTYLE MATEUSZ 2 SENSOR) MISC, 1 each by Does not apply route every 14 days, Disp: 2 each, Rfl: 2    Continuous Blood Gluc Sensor (FREESTYLE MATEUSZ 14 DAY SENSOR) MISC, apply 1 SENSOR to back OF UPPER ARM REMOVE AND REPLACE every 14 days use with DEVICE to MONITOR BLOOD SUGAR, Disp: 2 each, Rfl: 5    FARXIGA 10 MG tablet, take 1 tablet by mouth every morning, Disp: 90 tablet, Rfl: 1    atorvastatin (LIPITOR) 40 MG tablet, take 1 tablet by mouth once daily, Disp: 90 tablet, Rfl: 1    FLUoxetine (PROZAC) 40 MG capsule, Take 2 capsules by mouth daily, Disp: 180 capsule, Rfl: 1    Insulin Pen Needle (PEN NEEDLES) 31G X 6 MM MISC, 1 each by Does not apply route 3 times daily E11.9 Z79.4, Disp: 100 each, Rfl: 11    lisinopril (PRINIVIL;ZESTRIL) 10 MG tablet, take 1 tablet by mouth once daily, Disp: 90 tablet, Rfl: 3 acute distress. Appearance: He is well-developed. He is not diaphoretic. HENT:      Right Ear: External ear normal.      Left Ear: External ear normal.      Nose: Nose normal.      Mouth/Throat:      Mouth: Mucous membranes are moist.   Eyes:      General:         Right eye: No discharge. Left eye: No discharge. Conjunctiva/sclera: Conjunctivae normal.      Pupils: Pupils are equal, round, and reactive to light. Neck:      Vascular: No JVD. Cardiovascular:      Rate and Rhythm: Normal rate and regular rhythm. Heart sounds: Normal heart sounds. No murmur heard. Pulmonary:      Effort: Pulmonary effort is normal. No respiratory distress. Breath sounds: Normal breath sounds. Musculoskeletal:         General: No tenderness or deformity. Normal range of motion. Cervical back: Normal range of motion. Skin:     General: Skin is warm and dry. Capillary Refill: Capillary refill takes less than 2 seconds. Coloration: Skin is not pale. Findings: No erythema or rash. Neurological:      Mental Status: He is alert and oriented to person, place, and time. Coordination: Coordination normal.   Psychiatric:         Behavior: Behavior normal.         Thought Content: Thought content normal.         Judgment: Judgment normal.       Assessment/Plan:      Domenico Miller was seen today for memory loss, discuss labs and medication refill. Diagnoses and all orders for this visit:    Type 2 diabetes mellitus without complication, with long-term current use of insulin ([facility])  -     POCT glycosylated hemoglobin (Hb A1C)  -     Continuous Blood Gluc Sensor (FREESTYLE VANESSA 2 SENSOR) MISC; 1 each by Does not apply route every 14 days    Other fatigue  -     CD4 Percent and Absolute; Future  -     TSH; Future  -     T3; Future  -     T4, Free; Future  -     Testosterone, free, total; Future  -     Testosterone;  Future  -     Rob Churches Virus (EBV) Antibody Panel I; Future  -     Coxsackie B Virus Abs; Future  -     Coxsackie A Panel; Future    Prostate cancer screening  -     PSA Screening; Future    A1c is 7.9 today. Patient agrees to attempting to obtain an Omni pod insulin pump if insurance will pay. We will start the process. Return in about 3 months (around 10/29/2022). Patient instructions given andreviewed.         Electronically signed by YAMILE Goss CNP on 7/29/2022 at 10:44 AM

## 2022-08-24 RX ORDER — FLUOXETINE HYDROCHLORIDE 40 MG/1
CAPSULE ORAL
Qty: 180 CAPSULE | Refills: 1 | Status: SHIPPED | OUTPATIENT
Start: 2022-08-24

## 2022-09-27 NOTE — ED NOTES
Discharge instructions and prescriptions reviewed with pt. Pt verbalized understanding. Pt ambulated out in stable condition. Assessment unchanged upon discharge.      Miguelina Pham RN  08/07/21 5571 Mohs Histo Method Verbiage: Each section was then chromacoded and processed in the Mohs lab using the Mohs protocol and submitted for frozen section.

## 2022-09-28 ENCOUNTER — NURSE ONLY (OUTPATIENT)
Dept: LAB | Age: 57
End: 2022-09-28

## 2022-09-28 DIAGNOSIS — E11.65 TYPE 2 DIABETES MELLITUS WITH HYPERGLYCEMIA, WITH LONG-TERM CURRENT USE OF INSULIN (HCC): ICD-10-CM

## 2022-09-28 DIAGNOSIS — Z79.4 TYPE 2 DIABETES MELLITUS WITH HYPERGLYCEMIA, WITH LONG-TERM CURRENT USE OF INSULIN (HCC): ICD-10-CM

## 2022-09-28 LAB
ALBUMIN SERPL-MCNC: 3.9 G/DL (ref 3.5–5.1)
ALP BLD-CCNC: 75 U/L (ref 38–126)
ALT SERPL-CCNC: 19 U/L (ref 11–66)
ANION GAP SERPL CALCULATED.3IONS-SCNC: 8 MEQ/L (ref 8–16)
AST SERPL-CCNC: 16 U/L (ref 5–40)
BILIRUB SERPL-MCNC: 0.2 MG/DL (ref 0.3–1.2)
BUN BLDV-MCNC: 20 MG/DL (ref 7–22)
CALCIUM SERPL-MCNC: 8.9 MG/DL (ref 8.5–10.5)
CHLORIDE BLD-SCNC: 105 MEQ/L (ref 98–111)
CHOLESTEROL, TOTAL: 272 MG/DL (ref 100–199)
CO2: 25 MEQ/L (ref 23–33)
CREAT SERPL-MCNC: 0.7 MG/DL (ref 0.4–1.2)
CREATININE, URINE: 81.8 MG/DL
GFR SERPL CREATININE-BSD FRML MDRD: > 90 ML/MIN/1.73M2
GLUCOSE BLD-MCNC: 137 MG/DL (ref 70–108)
HDLC SERPL-MCNC: 37 MG/DL
LDL CHOLESTEROL CALCULATED: 202 MG/DL
MICROALBUMIN UR-MCNC: < 1.2 MG/DL
MICROALBUMIN/CREAT UR-RTO: 15 MG/G (ref 0–30)
POTASSIUM SERPL-SCNC: 4.1 MEQ/L (ref 3.5–5.2)
SODIUM BLD-SCNC: 138 MEQ/L (ref 135–145)
T3 FREE: 2.7 PG/ML (ref 2.02–4.43)
T4 FREE: 0.99 NG/DL (ref 0.93–1.76)
TOTAL PROTEIN: 6.4 G/DL (ref 6.1–8)
TRIGL SERPL-MCNC: 163 MG/DL (ref 0–199)
TSH SERPL DL<=0.05 MIU/L-ACNC: 2.06 UIU/ML (ref 0.4–4.2)

## 2022-10-18 DIAGNOSIS — E11.9 TYPE 2 DIABETES MELLITUS WITHOUT COMPLICATION, WITH LONG-TERM CURRENT USE OF INSULIN (HCC): ICD-10-CM

## 2022-10-18 DIAGNOSIS — Z79.4 TYPE 2 DIABETES MELLITUS WITHOUT COMPLICATION, WITH LONG-TERM CURRENT USE OF INSULIN (HCC): ICD-10-CM

## 2022-10-18 RX ORDER — FLASH GLUCOSE SENSOR
KIT MISCELLANEOUS
Qty: 2 EACH | Refills: 11 | Status: SHIPPED | OUTPATIENT
Start: 2022-10-18

## 2022-10-31 ENCOUNTER — OFFICE VISIT (OUTPATIENT)
Dept: INTERNAL MEDICINE CLINIC | Age: 57
End: 2022-10-31
Payer: COMMERCIAL

## 2022-10-31 VITALS
SYSTOLIC BLOOD PRESSURE: 132 MMHG | HEIGHT: 67 IN | DIASTOLIC BLOOD PRESSURE: 70 MMHG | BODY MASS INDEX: 32.43 KG/M2 | TEMPERATURE: 98.2 F | HEART RATE: 62 BPM | WEIGHT: 206.6 LBS

## 2022-10-31 DIAGNOSIS — E11.65 TYPE 2 DIABETES MELLITUS WITH HYPERGLYCEMIA, WITH LONG-TERM CURRENT USE OF INSULIN (HCC): Primary | ICD-10-CM

## 2022-10-31 DIAGNOSIS — Z79.4 TYPE 2 DIABETES MELLITUS WITH HYPERGLYCEMIA, WITH LONG-TERM CURRENT USE OF INSULIN (HCC): Primary | ICD-10-CM

## 2022-10-31 LAB — HBA1C MFR BLD: 6.9 % (ref 4.3–5.7)

## 2022-10-31 PROCEDURE — 83036 HEMOGLOBIN GLYCOSYLATED A1C: CPT | Performed by: INTERNAL MEDICINE

## 2022-10-31 PROCEDURE — G0108 DIAB MANAGE TRN  PER INDIV: HCPCS | Performed by: INTERNAL MEDICINE

## 2022-10-31 NOTE — PROGRESS NOTES
Diabetes Mellitus Type II, Initial Visit:   Dr. Mulugeta Perez  Patient here for an initial evaluation of Type 2 diabetes mellitus. Current symptoms/problems include none and have been. .    The patient was initially diagnosed with Type 2 diabetes mellitus 2008. Known diabetic complications: peripheral neuropathy  Cardiovascular risk factors: advanced age (older than 54 for men, 72 for women), diabetes mellitus, dyslipidemia, family history of premature cardiovascular disease, hypertension, male gender, obesity (BMI >= 30 kg/m2), and smoking/ tobacco exposure  Current diabetic medications include. Januvia 100mg daily               Farxiga 10mg daily    Eye exam current (within one year): yes 3/2022 Dr. Marina Gibson  Weight trend: decreasing steadily. 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 RN  Current monitoring regimen:  Freestyle Mateusz CGM        Time in Range:  Very High 0%, High 1% In Target Range 98%, Low 0%, Very Low 0%   Home blood sugar records: Glucose levels in goal with rare exception when 20-30 grams carb are eaten. Blood sugar level in the office today 4.5hrpp 88mg/dL  Any episodes of hypoglycemia? no    Is He on ACE inhibitor or angiotensin II receptor blocker? Yes   lisinopril (Prinivil)     Focus  Initial visit for Diabetes education. A1C 6.9% 10/31/22. Weight is down approx 8-10 # in the past 3 weeks. Sunday Sumit and his wife Silvana Myers decided approx 3-4 weeks ago to change to a modified keto diet. Sunday Sumit is taking in 50 grams carb per day; most days. Glucose levels have fallen into goal most of the time and he has stopped taking his Lantus and Humalog. He continues with his Tejal.

## 2022-10-31 NOTE — PATIENT INSTRUCTIONS
Continue with current meal plan      --keep a balance of healthy food choices  Keep checking blood at least every 8 hours           Goals for before meals   (normal )            2hours after completing a meal goal is under 150/160                                          (Normal is under 140)  Think about adding an exercise effort to your work schedule           --walks on the weekends, strength training during the week  Ask Dr. Erin Hernández about the CPeptide and if you need calcium with                Almost no dairy intake

## 2023-01-08 DIAGNOSIS — I10 ESSENTIAL HYPERTENSION: ICD-10-CM

## 2023-01-09 RX ORDER — LISINOPRIL 10 MG/1
TABLET ORAL
Qty: 90 TABLET | Refills: 3 | Status: SHIPPED | OUTPATIENT
Start: 2023-01-09

## 2023-01-18 ENCOUNTER — NURSE ONLY (OUTPATIENT)
Dept: LAB | Age: 58
End: 2023-01-18

## 2023-01-18 ENCOUNTER — OFFICE VISIT (OUTPATIENT)
Dept: FAMILY MEDICINE CLINIC | Age: 58
End: 2023-01-18
Payer: COMMERCIAL

## 2023-01-18 VITALS
DIASTOLIC BLOOD PRESSURE: 60 MMHG | HEIGHT: 67 IN | RESPIRATION RATE: 14 BRPM | WEIGHT: 200 LBS | TEMPERATURE: 98.1 F | HEART RATE: 59 BPM | SYSTOLIC BLOOD PRESSURE: 118 MMHG | OXYGEN SATURATION: 96 % | BODY MASS INDEX: 31.39 KG/M2

## 2023-01-18 DIAGNOSIS — E11.9 TYPE 2 DIABETES MELLITUS WITHOUT COMPLICATION, WITH LONG-TERM CURRENT USE OF INSULIN (HCC): ICD-10-CM

## 2023-01-18 DIAGNOSIS — Z79.4 TYPE 2 DIABETES MELLITUS WITHOUT COMPLICATION, WITH LONG-TERM CURRENT USE OF INSULIN (HCC): ICD-10-CM

## 2023-01-18 DIAGNOSIS — E11.65 TYPE 2 DIABETES MELLITUS WITH HYPERGLYCEMIA, WITH LONG-TERM CURRENT USE OF INSULIN (HCC): ICD-10-CM

## 2023-01-18 DIAGNOSIS — Z79.4 TYPE 2 DIABETES MELLITUS WITH HYPERGLYCEMIA, WITH LONG-TERM CURRENT USE OF INSULIN (HCC): ICD-10-CM

## 2023-01-18 DIAGNOSIS — F41.8 DEPRESSION WITH ANXIETY: Primary | ICD-10-CM

## 2023-01-18 DIAGNOSIS — F43.10 PTSD (POST-TRAUMATIC STRESS DISORDER): ICD-10-CM

## 2023-01-18 LAB
ALBUMIN SERPL-MCNC: 4.5 G/DL (ref 3.5–5.1)
ALP BLD-CCNC: 78 U/L (ref 38–126)
ALT SERPL-CCNC: 19 U/L (ref 11–66)
ANION GAP SERPL CALCULATED.3IONS-SCNC: 13 MEQ/L (ref 8–16)
AST SERPL-CCNC: 13 U/L (ref 5–40)
AVERAGE GLUCOSE: 141 MG/DL (ref 70–126)
BILIRUB SERPL-MCNC: 0.3 MG/DL (ref 0.3–1.2)
BUN BLDV-MCNC: 15 MG/DL (ref 7–22)
CALCIUM SERPL-MCNC: 9.4 MG/DL (ref 8.5–10.5)
CHLORIDE BLD-SCNC: 103 MEQ/L (ref 98–111)
CHOLESTEROL, TOTAL: 251 MG/DL (ref 100–199)
CO2: 25 MEQ/L (ref 23–33)
CREAT SERPL-MCNC: 0.8 MG/DL (ref 0.4–1.2)
GFR SERPL CREATININE-BSD FRML MDRD: > 60 ML/MIN/1.73M2
GLUCOSE BLD-MCNC: 129 MG/DL (ref 70–108)
HBA1C MFR BLD: 6.7 % (ref 4.4–6.4)
HDLC SERPL-MCNC: 45 MG/DL
LDL CHOLESTEROL CALCULATED: 186 MG/DL
POTASSIUM SERPL-SCNC: 4.4 MEQ/L (ref 3.5–5.2)
SODIUM BLD-SCNC: 141 MEQ/L (ref 135–145)
TOTAL PROTEIN: 6.4 G/DL (ref 6.1–8)
TRIGL SERPL-MCNC: 100 MG/DL (ref 0–199)
VITAMIN D 25-HYDROXY: 31 NG/ML (ref 30–100)

## 2023-01-18 PROCEDURE — 3017F COLORECTAL CA SCREEN DOC REV: CPT | Performed by: NURSE PRACTITIONER

## 2023-01-18 PROCEDURE — 3074F SYST BP LT 130 MM HG: CPT | Performed by: NURSE PRACTITIONER

## 2023-01-18 PROCEDURE — 3046F HEMOGLOBIN A1C LEVEL >9.0%: CPT | Performed by: NURSE PRACTITIONER

## 2023-01-18 PROCEDURE — 1036F TOBACCO NON-USER: CPT | Performed by: NURSE PRACTITIONER

## 2023-01-18 PROCEDURE — 99214 OFFICE O/P EST MOD 30 MIN: CPT | Performed by: NURSE PRACTITIONER

## 2023-01-18 PROCEDURE — G8427 DOCREV CUR MEDS BY ELIG CLIN: HCPCS | Performed by: NURSE PRACTITIONER

## 2023-01-18 PROCEDURE — 3078F DIAST BP <80 MM HG: CPT | Performed by: NURSE PRACTITIONER

## 2023-01-18 PROCEDURE — 2022F DILAT RTA XM EVC RTNOPTHY: CPT | Performed by: NURSE PRACTITIONER

## 2023-01-18 PROCEDURE — G8417 CALC BMI ABV UP PARAM F/U: HCPCS | Performed by: NURSE PRACTITIONER

## 2023-01-18 PROCEDURE — G8484 FLU IMMUNIZE NO ADMIN: HCPCS | Performed by: NURSE PRACTITIONER

## 2023-01-18 RX ORDER — BLOOD-GLUCOSE SENSOR
1 EACH MISCELLANEOUS
Qty: 2 EACH | Refills: 3 | Status: SHIPPED | OUTPATIENT
Start: 2023-01-18 | End: 2023-02-15

## 2023-01-18 RX ORDER — FLUOXETINE HYDROCHLORIDE 40 MG/1
CAPSULE ORAL
Qty: 180 CAPSULE | Refills: 1 | Status: SHIPPED | OUTPATIENT
Start: 2023-01-18

## 2023-01-18 SDOH — ECONOMIC STABILITY: FOOD INSECURITY: WITHIN THE PAST 12 MONTHS, YOU WORRIED THAT YOUR FOOD WOULD RUN OUT BEFORE YOU GOT MONEY TO BUY MORE.: NEVER TRUE

## 2023-01-18 SDOH — ECONOMIC STABILITY: FOOD INSECURITY: WITHIN THE PAST 12 MONTHS, THE FOOD YOU BOUGHT JUST DIDN'T LAST AND YOU DIDN'T HAVE MONEY TO GET MORE.: NEVER TRUE

## 2023-01-18 ASSESSMENT — ENCOUNTER SYMPTOMS
SHORTNESS OF BREATH: 0
BACK PAIN: 0
DIARRHEA: 0
RHINORRHEA: 0
CONSTIPATION: 0
EYE REDNESS: 0
TROUBLE SWALLOWING: 0
WHEEZING: 0
NAUSEA: 0
ALLERGIC/IMMUNOLOGIC NEGATIVE: 1
COUGH: 0
EYE PAIN: 0
EYE DISCHARGE: 0
ABDOMINAL PAIN: 0
SORE THROAT: 0
VOMITING: 0

## 2023-01-18 ASSESSMENT — SOCIAL DETERMINANTS OF HEALTH (SDOH): HOW HARD IS IT FOR YOU TO PAY FOR THE VERY BASICS LIKE FOOD, HOUSING, MEDICAL CARE, AND HEATING?: NOT HARD AT ALL

## 2023-01-18 NOTE — PROGRESS NOTES
[address] [address] [address] [facility] [address]  Dept: [phone]  Dept Fax: [phone]  Loc: [phone]     Visit Date:  1/18/2023      Patient:  Brianna Coon  YOB: 1965    HPI:     Chief Complaint   Patient presents with    Depression     Discuss adding medication    Medication Refill       Patient presents primarily for his moderate to severe depression, which worsens in the wintertime. Patient states he feels a lot of purposelessness in life but he does manage to get through his work days. Bothered by his PTSD issues. States Prozac does help him and he has been on it for more than a couple of years. Patient requesting Jose Pennington freestyle #3 sensors to home monitor his blood sugar. Patient states he is lost about 20 pounds and is no longer taking insulin. DepressionPatient presents with the following symptoms: decreased concentration and nervousness/anxiety. Patient is not experiencing: shortness of breath. Medication Refill  Pertinent negatives include no abdominal pain, arthralgias, congestion, coughing, fatigue, fever, headaches, myalgias, nausea, rash, sore throat, vomiting or weakness.      Medications    Current Outpatient Medications:     FLUoxetine (PROZAC) 40 MG capsule, TAKE 2 CAPSULES BY MOUTH ONCE DAILY, Disp: 180 capsule, Rfl: 1    cariprazine hcl (VRAYLAR) 1.5 MG capsule, Take 1 capsule by mouth daily, Disp: 30 capsule, Rfl: 1    Continuous Blood Gluc Sensor (FREESTYLE VANESSA 3 SENSOR) MISC, 1 each by Does not apply route every 14 days for 28 days, Disp: 2 each, Rfl: 3    lisinopril (PRINIVIL;ZESTRIL) 10 MG tablet, take 1 tablet by mouth once daily, Disp: 90 tablet, Rfl: 3    SITagliptin (JANUVIA) 100 MG tablet, take 1 tablet by mouth once daily, Disp: 90 tablet, Rfl: 3    dapagliflozin (FARXIGA) 10 MG tablet, Take 1 tablet by mouth every morning, Disp: 90 tablet, Rfl: 1    Continuous Blood Gluc Sensor (FREESTYLE VANESSA 2 SENSOR) MISC, apply 1 SENSOR to back OF UPPER ARM REMOVE AND REPLACE every 14 days use with DEVICE to MONITOR BLOOD SUGAR, Disp: 2 each, Rfl: 11    Insulin Pen Needle (PEN NEEDLES) 31G X 6 MM MISC, Use 1 four  times a day (Patient taking differently: Use 1 four  times a day), Disp: 400 each, Rfl: 1    LANTUS SOLOSTAR 100 UNIT/ML injection pen, 80 units daily, Disp: 15 Adjustable Dose Pre-filled Pen Syringe, Rfl: 3    insulin lispro, 1 Unit Dial, (HUMALOG KWIKPEN) 100 UNIT/ML SOPN, Take 4 units at breakfast, 8 units at lunch and 12 units at dinner plus sliding scale for maximum of 50 units a day., Disp: 15 Adjustable Dose Pre-filled Pen Syringe, Rfl: 2    Continuous Blood Gluc Sensor (FREESTYLE VANESSA 14 DAY SENSOR) [facility], apply 1 SENSOR to back OF UPPER ARM REMOVE AND REPLACE every 14 days use with DEVICE to MONITOR BLOOD SUGAR, Disp: 2 each, Rfl: 5    omeprazole (PRILOSEC) 40 MG delayed release capsule, take 1 capsule by mouth daily, Disp: 90 capsule, Rfl: 3    cyclobenzaprine (FLEXERIL) 10 MG tablet, nightly as needed, Disp: , Rfl:     acetaminophen (TYLENOL) 500 MG tablet, Take 1 tablet by mouth every 4 hours as needed for Pain or Fever, Disp: 20 tablet, Rfl: 0    The patient is allergic to iodides, penicillins, rosuvastatin, and contrast [gadolinium derivatives]. Past Medical History  Romel Cagle  has a past medical history of Depression with anxiety, Diabetes mellitus ([address] GERD (gastroesophageal reflux disease), Hyperlipidemia, and Hypertension. Subjective:      Review of Systems   Constitutional:  Negative for activity change, fatigue and fever. HENT:  Negative for congestion, ear pain, rhinorrhea, sore throat and trouble swallowing. Eyes:  Negative for pain, discharge and redness. Respiratory:  Negative for cough, shortness of breath and wheezing. Cardiovascular: Negative. Gastrointestinal:  Negative for abdominal pain, constipation, diarrhea, nausea and vomiting.    Endocrine: Negative. Genitourinary:  Negative for dysuria, frequency and urgency. Musculoskeletal:  Negative for arthralgias, back pain and myalgias. Skin:  Negative for rash. Allergic/Immunologic: Negative. Neurological:  Negative for dizziness, tremors, weakness and headaches. Hematological: Negative. Psychiatric/Behavioral:  Positive for decreased concentration, depression, dysphoric mood and sleep disturbance. The patient is nervous/anxious. Objective:     /60   Pulse 59   Temp 98.1 °F (36.7 °C) (Oral)   Resp 14   Ht 5' 7\" (1.702 m)   Wt 200 lb (90.7 kg)   SpO2 96%   BMI 31.32 kg/m²     Physical Exam  Constitutional:       General: He is not in acute distress. Appearance: He is well-developed. He is not diaphoretic. HENT:      Right Ear: External ear normal.      Left Ear: External ear normal.      Nose: Nose normal.      Mouth/Throat:      Mouth: Mucous membranes are moist.   Eyes:      General:         Right eye: No discharge. Left eye: No discharge. Conjunctiva/sclera: Conjunctivae normal.      Pupils: Pupils are equal, round, and reactive to light. Neck:      Vascular: No JVD. Cardiovascular:      Rate and Rhythm: Normal rate and regular rhythm. Heart sounds: Normal heart sounds. No murmur heard. Pulmonary:      Effort: Pulmonary effort is normal. No respiratory distress. Breath sounds: Normal breath sounds. Musculoskeletal:         General: No tenderness or deformity. Normal range of motion. Cervical back: Normal range of motion. Skin:     General: Skin is warm and dry. Capillary Refill: Capillary refill takes less than 2 seconds. Coloration: Skin is not pale. Findings: No erythema or rash. Neurological:      Mental Status: He is alert and oriented to person, place, and time.       Coordination: Coordination normal.   Psychiatric:         Attention and Perception: Attention and perception normal.         Mood and Affect: Mood is depressed. Affect is flat and tearful. Speech: Speech normal.         Behavior: Behavior normal.         Thought Content: Thought content normal.         Cognition and Memory: Cognition and memory normal.         Judgment: Judgment normal.       Assessment/Plan:      Sherald Spurling was seen today for depression and medication refill. Diagnoses and all orders for this visit:    Depression with anxiety  -     FLUoxetine (PROZAC) 40 MG capsule; TAKE 2 CAPSULES BY MOUTH ONCE DAILY  -     cariprazine hcl (VRAYLAR) 1.5 MG capsule; Take 1 capsule by mouth daily  -     [address] [place] [address], Psychiatry, [address] [address], [facility], [address] [place] [address], Psychology, [employer]    Type 2 diabetes mellitus without complication, with long-term current use of insulin ([address]  -     Continuous Blood Gluc Sensor (FREESTYLE VANESSA 3 SENSOR) MISC; 1 each by Does not apply route every 14 days for 28 days    PTSD (post-traumatic stress disorder)  -     [address] [place] [address], Psychiatry, [address] [address], [facility] [place], [address] [place] [address], Psychology, [facility]    Patient is referred to psychiatry for consideration of proper medication balance. This is considered likely short-term and the patient is being referred to psychology for more longer-term counseling type setting. Return in about 6 months (around 7/18/2023), or if symptoms worsen or fail to improve. Patient instructions given kehinded.         Electronically signed by YAMILE Perdomo CNP on 1/18/2023 at 10:58 AM

## 2023-01-19 ENCOUNTER — TELEPHONE (OUTPATIENT)
Dept: FAMILY MEDICINE CLINIC | Age: 58
End: 2023-01-19

## 2023-01-19 DIAGNOSIS — F41.8 DEPRESSION WITH ANXIETY: Primary | ICD-10-CM

## 2023-01-19 DIAGNOSIS — F43.10 PTSD (POST-TRAUMATIC STRESS DISORDER): ICD-10-CM

## 2023-01-19 NOTE — TELEPHONE ENCOUNTER
[facility] has denied Vraylar. They will only cover it if he has tried and failed the following:    Olanzapine  Quetiapine  Ziprasidone  Risperidone  Aripiprazole (may require PA)    Please advise, patient will need notified of change.

## 2023-01-23 RX ORDER — QUETIAPINE FUMARATE 25 MG/1
25 TABLET, FILM COATED ORAL 2 TIMES DAILY
Qty: 60 TABLET | Refills: 3 | Status: SHIPPED | OUTPATIENT
Start: 2023-01-23

## 2023-01-23 NOTE — TELEPHONE ENCOUNTER
Pt informed of Quetiapine Rx Eli Campbell is starting him. Pt is seeing Psych 2/27/23. States he will call back to schedule after he gets his work schedule.

## 2023-01-23 NOTE — TELEPHONE ENCOUNTER
Please call patient and let him know I put in a different medication for him to try for 30 days. He can then follow-up with me in the context of his psychiatry follow-ups. My intention is to get him on Vraylar if at all possible.

## 2023-02-11 ENCOUNTER — PATIENT MESSAGE (OUTPATIENT)
Dept: FAMILY MEDICINE CLINIC | Age: 58
End: 2023-02-11

## 2023-02-11 DIAGNOSIS — E11.65 TYPE 2 DIABETES MELLITUS WITH HYPERGLYCEMIA, WITH LONG-TERM CURRENT USE OF INSULIN (HCC): ICD-10-CM

## 2023-02-11 DIAGNOSIS — Z79.4 TYPE 2 DIABETES MELLITUS WITH HYPERGLYCEMIA, WITH LONG-TERM CURRENT USE OF INSULIN (HCC): ICD-10-CM

## 2023-02-13 NOTE — TELEPHONE ENCOUNTER
From: Veena Wynn  To: Kortney John  Sent: 2/11/2023 8:19 AM EST  Subject: Medication refill    Hi Dana Whitt. I have tried to get dr Sridhar Purdy to refill my Gregg Pulse and [place] and [place]. I am out of both medications. I was wondering if you would be able to resume those medications for me and take it over.  I should have stayed with you in the beginning     Thanks for your help

## 2023-02-15 ENCOUNTER — TELEPHONE (OUTPATIENT)
Dept: FAMILY MEDICINE CLINIC | Age: 58
End: 2023-02-15

## 2023-02-15 DIAGNOSIS — E11.65 TYPE 2 DIABETES MELLITUS WITH HYPERGLYCEMIA, WITH LONG-TERM CURRENT USE OF INSULIN (HCC): Primary | ICD-10-CM

## 2023-02-15 DIAGNOSIS — Z79.4 TYPE 2 DIABETES MELLITUS WITH HYPERGLYCEMIA, WITH LONG-TERM CURRENT USE OF INSULIN (HCC): Primary | ICD-10-CM

## 2023-02-15 NOTE — TELEPHONE ENCOUNTER
[facility] has denied Lar[place]ie. Denial states he must try and fail a 3 month trial of Jardiance. I also had to do a PA for his Januvia and I am still waiting to hear back on that. Please advise.

## 2023-02-15 NOTE — TELEPHONE ENCOUNTER
Januvia was denied by [facility]. Must have a 90 day trial of each of the following:    Negrita Perrin    Please advise.

## 2023-02-27 ENCOUNTER — OFFICE VISIT (OUTPATIENT)
Dept: PSYCHIATRY | Age: 58
End: 2023-02-27

## 2023-02-27 DIAGNOSIS — F33.1 MAJOR DEPRESSIVE DISORDER, RECURRENT EPISODE, MODERATE (HCC): Primary | ICD-10-CM

## 2023-02-27 DIAGNOSIS — F40.00 AGORAPHOBIA: ICD-10-CM

## 2023-02-27 DIAGNOSIS — F41.1 GENERALIZED ANXIETY DISORDER: ICD-10-CM

## 2023-02-27 RX ORDER — ARIPIPRAZOLE 2 MG/1
2 TABLET ORAL DAILY
Qty: 30 TABLET | Refills: 0 | Status: SHIPPED | OUTPATIENT
Start: 2023-02-27 | End: 2023-03-29

## 2023-02-27 NOTE — PATIENT INSTRUCTIONS
-Please take medications as prescribed  -Refrain from alcohol or drug use  -Seek emergency help via the emergency and/or calling 911 should symptoms become severe, worsen, or with other concerning symptoms. Go immediately to the emergency room and/or call 911 with any suicidal or homicidal ideations or if audio/visual hallucinations develop.   -Contact office with any questions or concerns. Crisis phone numbers:  Dahiana Kuhn, and [facility] [phone]. Anuja Araujo and [employer] [address] [phone]. [employer] [phone]. [address] [place] [address] [address] and [address] [phone].

## 2023-02-27 NOTE — PROGRESS NOTES
[address] [place] [address]  Dept: [phone]  Dept Fax: [phone]  Loc: [phone]    Visit Date: 2/27/2023    SUBJECTIVE DATA     CHIEF COMPLAINT:    Chief Complaint   Patient presents with    Anxiety    Depression    New Patient    Psychiatric Evaluation         History obtained from: patient    HISTORY OF PRESENT ILLNESS:    Anahy Lynn is a 59 y.o. male who presents to the office to establish care for medication management of depression and anxiety. Patient was referred by PCP who has been prescribing psychotropic medications. Patient reports he has been taking Prozac current dose for 2 years. Reports his PCP recently started him on Seroquel 2 weeks ago. Reports compliance with Prozac but states he has been taking Seroquel only once at night as it is too sedating for him to take during the day. Denies side effects from medications. Reports Prozac has \"worked the best for me. \"    Patient endorses depression  -Endorses feeling sad and down most days  -Endorses decreased interest  -States concentration is impaired \"somewhat. \"  -Denies changes in appetite  -Endorses feelings of guilt  -Denies feeling hopeless or helpless  -Endorses decreased energy and motivation  -Endorses low self-esteem he states has always been an issue  -Denies thoughts of harm to self or others  -Reports depression started in [facility] and has been episodic since  -Reports this episode of depression started and 2019 when he moved back to [place] from [place]  -[place] I lived in [place] I was depressed and when I lived in [place] I was not depressed, I occasionally feel sad and depressed but nothing like I do here. \"  -Reports he moved from [place] to [place] in 2011 and then moved back to [place] in 2019 because of his wife's family  -Endorses depression does have a seasonal pattern, depression is worse in the fall and winter and improves during the spring and summer months    Patient endorses sleep disturbances  -Denies trouble initiating sleep  -Patient endorses trouble maintaining sleep at times, reports will wake up multiple times a night some nights and has trouble getting back to sleep  -History of sleep apnea, reports he uses CPAP nightly  -Reports feeling rested \"some mornings\"  -Denies bluelight exposure prior to at bedtime  -Endorses maintains a normal sleep routine    Endorses anxiety  -Reports anxiety is \"worse when I am not at home, when I am at home its not bad. \"  -States that when he is out in public he has to be watching everything everybody is doing. States that he he blames the [employer] for this as when he was in [place] he was trying to be hypervigilant of everybody in his surroundings  -He reports that he does not avoid going out to stores and restaurants but at times will have to leave if there it is a large crowd because \"there are too many people for me to pay attention to keep my eye on what everybody is doing. \"  -Patient endorses worrying and trouble controlling worry  -Reports he can be easily irritated and annoyed  -Endorses when he is out in public he feels on edge like something bad will happen  -Reports trouble relaxing at times  -Endorses panic attacks in the past in which she describes he \"will be crying and sweating\" states that these attacks last 1 to 2 minutes in length. Denies past episodes of hypomania/Lyudmila    Trauma  -Reports when in the [employer] he was stationed in [place] and there was a communist party marching out of their station. States that he had strict orders to shoot anyone who tried to charge the station. States being in the [employer] he was also trying to be hypervigilant and always been aware surroundings which she associates with his anxiety now and having to watch everyone when out in public.   States this was traumatic, denies flashbacks or intrusive thoughts or nightmares of past trauma  -Reports his daughter Lina went into cardiac arrest when she was 8 months old and he had to perform CPR on him, states this was traumatic. Denies intrusive thoughts, flashbacks or nightmares. 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 Support-wife    Denies hallucinations, delusions, homicidal ideations or suicidal ideations. He is not in counseling at this time    Patient endorses a past history of alcohol abuse  -Reports from the years of [id number] he consumes alcohol \"all day. \"  -States he would go to work \"drunk. \"  -Reports he \"may have had\" withdrawal symptoms but is unsure  -Denies past seizures or DTs  -Reports he never received treatment for alcohol abuse  -Reports currently drinking alcohol \"socially\" but denies problems with alcohol abuse or binge drinking currently      HPI      PSYCHIATRIC HISTORY:  Patient has had prior care with the following:    [] Psychiatrist    [] Psychologist    [] Other Therapist    [x] None    The patient has had 0 lifetime suicide attempts. Patient reports 0 psych hospital admissions    Past psychiatric medications include: Prozac, Seroquel, Cymbalta, Lexapro     Adverse reactions from psychotropic medications:  Denies       Lifetime Psychiatric Review of Systems       Lyudmila or Hypomania:  no     Panic Attacks:  yes      Phobias:  no     Obsessions and Compulsions:  no     Body or Vocal Tics:  no     Hallucinations:  no     Delusions:  no      Binging/Purging: no     Anger: no     Trauma: yes      SOCIAL HISTORY:  Patient was born in [place], moved to [place] when in the 4th grade and raised by his mother, parents  when he was 1years of age. Had a relationship with Father growing up, would stay with father while growing up. \"Childhood sucked. I felt loved but was forced to grow up earlier than I wanted to.   I was the second oldest but oldest sibling was out of the house so I was the oldest in the house at the time so had to help my mother out. \"  States that he \"tried to be an off from my parents but at times felt I was not going up. 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   CHILDREN:3 (33 years, 31 years and 24 years)   SUBSTANCE USE:    -Tobacco: Denies current use     -Illicit substance: Denies current use    -Alcohol: Endorses past history of alcohol abuse see HPI    -Caffeine: Coffee daily \"about a pot\" last drink at 10 am   Social History     Socioeconomic History    Marital status:      Spouse name: Not on file    Number of children: Not on file    Years of education: Not on file    Highest education level: Not on file   Occupational History    Not on file   Tobacco Use    Smoking status: Former     Packs/day: 1.00     Years: 25.00     Pack years: 25.00     Types: Cigarettes     Start date: 1980     Quit date: 2005     Years since quittin.6    Smokeless tobacco: Never   Vaping Use    Vaping Use: Never used   Substance and Sexual Activity    Alcohol use: Yes     Comment: socially wine; alcohol abuse in the past [id number]    Drug use: Never    Sexual activity: Not on file   Other Topics Concern    Not on file   Social History Narrative    Not on file     Social Determinants of Health     Financial Resource Strain: Low Risk     Difficulty of Paying Living Expenses: Not hard at all   Food Insecurity: No Food Insecurity    Worried About Running Out of Food in the Last Year: Never true    Ran Out of Food in the Last Year: Never true   Transportation Needs: Not on file   Physical Activity: Not on file   Stress: Not on file   Social Connections: Not on file   Intimate Partner Violence: Not on file   Housing Stability: Not on file       FAMILY HISTORY: Family History   Problem Relation Age of Onset    Anxiety Disorder Mother     Heart Disease Mother     Heart Disease Father     No Known Problems Sister     No Known Problems Sister          Psychiatric Family History  See above     PAST MEDICAL HISTORY:    Past Medical History:   Diagnosis Date    Depression with anxiety     Diabetes mellitus ([address]     GERD (gastroesophageal reflux disease)     Hyperlipidemia     Hypertension     PTSD (post-traumatic stress disorder)        PAST SURGICAL HISTORY:    Past Surgical History:   Procedure Laterality Date    BREAST SURGERY  1996    fatty tissue left breat benign    CARDIAC [facility]IZATION  4-[facility]    SHOULDER SURGERY  2017    SHOULDER SURGERY  11/30/2020        Right shoulder arthroscopy cam procedure extensive debridement, BT, CTR, ulnar nerve decompression                      TONSILLECTOMY  1973    addenoidectomy       PREVIOUSMEDICATIONS:  Outpatient Medications Prior to Visit   Medication Sig Dispense Refill    empagliflozin (JARDIANCE) 25 MG tablet Take 1 tablet by mouth daily 90 tablet 3    FLUoxetine (PROZAC) 40 MG capsule TAKE 2 CAPSULES BY MOUTH ONCE DAILY 180 capsule 1    lisinopril (PRINIVIL;ZESTRIL) 10 MG tablet take 1 tablet by mouth once daily 90 tablet 3    omeprazole (PRILOSEC) 40 MG delayed release capsule take 1 capsule by mouth daily 90 capsule 3    acetaminophen (TYLENOL) 500 MG tablet Take 1 tablet by mouth every 4 hours as needed for Pain or Fever 20 tablet 0    dapagliflozin (FARXIGA) 10 MG tablet Take 1 tablet by mouth every morning 90 tablet 1    QUEtiapine (SEROQUEL) 25 MG tablet Take 1 tablet by mouth 2 times daily 60 tablet 3    Continuous Blood Gluc Sensor (FREESTYLE VANESSA 2 SENSOR) MISC apply 1 SENSOR to back OF UPPER ARM REMOVE AND REPLACE every 14 days use with DEVICE to MONITOR BLOOD SUGAR 2 each 11    Insulin Pen Needle (PEN NEEDLES) 31G X 6 MM MISC Use 1 four  times a day (Patient taking differently: Use 1 four times a day) 400 each 1    LANTUS SOLOSTAR 100 UNIT/ML injection pen 80 units daily 15 Adjustable Dose Pre-filled Pen Syringe 3    insulin lispro, 1 Unit Dial, (HUMALOG KWIKPEN) 100 UNIT/ML SOPN Take 4 units at breakfast, 8 units at lunch and 12 units at dinner plus sliding scale for maximum of 50 units a day. 15 Adjustable Dose Pre-filled Pen Syringe 2    cyclobenzaprine (FLEXERIL) 10 MG tablet nightly as needed      Continuous Blood Gluc Sensor (FREESTYLE VANESSA 14 DAY SENSOR) [facility] apply 1 SENSOR to back OF UPPER ARM REMOVE AND REPLACE every 14 days use with DEVICE to MONITOR BLOOD SUGAR 2 each 5     No facility-administered medications prior to visit. ALLERGIES:    Iodides, Penicillins, Rosuvastatin, and Contrast [gadolinium derivatives]    ROS:  Constitutional: Negative for appetite change, diaphoresis, and fever. HENT: Negative for congestion, sore throat and tinnitus. Eyes: Negative for visual disturbance. Respiratory: Negative for cough, shortness of breath and wheezing. Cardiovascular: Negative for chest pain and leg swelling. Gastrointestinal: Negative for nausea, vomiting, diarrhea. Negative for abdominal pain. Genitourinary: Negative for frequency. Musculoskeletal: Negative for arthralgias, myalgias and neck stiffness. Skin: Negative for puritis. Neurological: Negative for dizziness, weakness and headaches. The patient sees Philippe Chance MD as his primary care provider. SPECIALISTS: cardiology [facility])     OBJECTIVE DATA     There were no vitals taken for this visit. Wt Readings from Last 3 Encounters:   01/18/23 200 lb (90.7 kg)   11/09/22 203 lb 12.8 oz (92.4 kg)   10/31/22 206 lb 9.6 oz (93.7 kg)        Physical Exam    Constitutional:  Appears well-developed and well-nourished, no acute distress  HENT:   Head: Normocephalic and atraumatic. Eyes: Conjunctivae are normal. Right eye exhibits no discharge. Left eye exhibits no discharge. No scleral icterus.    Neck: Normal range of motion. Neck supple. Pulmonary/Chest:  Respirations easy and unlabored, no accessory muscle use or no audible wheezing noted. Musculoskeletal: Normal range of motion. Neurological: normal gait and station, no tremor noted. Skin: Skin is warm and dry. Patient is not diaphoretic. AIMS: Not completed this visit will complete at follow-up    Mental Status Evaluation:   Orientation: Alert, oriented, thought content appropriate   Mood:. Anxious and Dysthymic      Affect:  Mood Congruent      Appearance:  Casually Dressed and Clean   Activity:  Cooperative and Good Eye Contact   Gait/Posture: Normal   Speech:  Clear, Fluent, Normal Pitch and Volume, Age and Situation Appropriate   Thought Process: Within Normal Limits   Thought Content: Within Normal Limits   Cognition:  Grossly Intact   Memory: Intact   Insight:  Age Appropriate   Judgment: Age Appropriate   Suicidal Ideations: Denies Suicidal Ideation   Homicidal Ideations: Negative for homicidal ideation   Medication Side Effects: Present       Attention Span Attention span and concentration were age appropriate       Screenings Completed in This Encounter:     Anxiety and Depression:                    DIAGNOSIS AND ASSESSMENT DATA     DIAGNOSIS:   1. Major depressive disorder, recurrent episode, moderate ([address]    2. Generalized anxiety disorder    3. Agoraphobia    Rule out bipolar spectrum disorder  Rule out PTSD  Rule out panic disorder    PLAN   Follow-up:  Return in about 4 weeks (around 3/27/2023), or if symptoms worsen or fail to improve.     Prescriptions for this encounter:  New Prescriptions    ARIPIPRAZOLE (ABILIFY) 2 MG TABLET    Take 1 tablet by mouth daily       Orders Placed This Encounter   Medications    ARIPiprazole (ABILIFY) 2 MG tablet     Sig: Take 1 tablet by mouth daily     Dispense:  30 tablet     Refill:  0         Medications Discontinued During This Encounter   Medication Reason    QUEtiapine (SEROQUEL) 25 MG tablet Therapy completed       Additional orders:  Orders Placed This Encounter   Procedures    Lipid Panel    Hemoglobin A1C       Condition: Patient appears in  no acute distress  Patient is endorsing symptoms of depressive disorder as well as anxiety disorder.  Possible past trauma from  career, will rule out PTSD.  Patient is reporting sedation Seroquel, is only taking once at night.  Discussed treatment options with patient, he is agreeable to switching Seroquel to Abilify for augmentation.  Discussed the importance of individual psychotherapy in managing mood and anxiety.  Advised patient to establish care with a therapist, counseling packet given to patient. Patient is encouraged to utilize nonpharmacologic coping skills such as deep breathing, guided imagery, guided meditation, muscle relaxation, calming music, and/or journaling. The problem of recurrent insomnia is discussed. Avoidance of caffeine sources is strongly encouraged. Sleep hygiene issues are reviewed.  May consider trazodone if sleep disturbances persist.    Medication Adjustments:   -Continue Prozac 80 mg p.o. daily for management of mood and anxiety  -Discontinue Seroquel  -Start Abilify 2 mg p.o. daily for mood augmentation for management of depression    Risks, potential side effects, possible drug-drug interactions, benefits and alternate treatments discussed in detail.     Labs: Reviewed in medical records, lipid panel and hemoglobin A1c ordered.    EKG: Reviewed from 11-23-20 QTc 392    Patient has been instructed to seek emergency help via the emergency and/or calling 911 should symptoms become severe, worsen, or with other concerning symptoms. Patient instructed to go immediately to the emergency room and/or call 911 with any suicidal or homicidal ideations or if audio/visual hallucinations develop.  Patient given crisis center information. Patient stated understanding and agrees.     Support and reassurance given. All questions  answered. Patient stated understanding and is agreeable to treatment plan. Encouraged to call office with any questions or concerns. Provider Signature:  Electronically signed by YAMILE Huerta CNP on 3/1/2023 at 6:21 PM    **This report has been created using voice recognition software. It may contain minor errors which are inherent in voice recognition technology. **

## 2023-03-02 NOTE — TELEPHONE ENCOUNTER
Attempted to call patient number- \"not accepting incoming calls\"  LM on patient wife voicemail to have patient call back to be notified.

## 2023-03-02 NOTE — TELEPHONE ENCOUNTER
I would like the patient to stop taking the Januvia for now as his blood sugar is in pretty good place. Will reevaluate in 3 months.

## 2023-03-20 ENCOUNTER — TELEPHONE (OUTPATIENT)
Dept: FAMILY MEDICINE CLINIC | Age: 58
End: 2023-03-20

## 2023-03-20 NOTE — TELEPHONE ENCOUNTER
No longer seeing Dr Patricia Alvarado, only wants you to take care of his medications. You recently had to make some changes in his medications due to insurance. He is currently taking Jardiance and Lantus Insulin, 80 units. His blood sugars are always high and he has gained 5 lbs. He had to change to [place] due to insurance. He really wants to get off of insulin. He previously took Tejal and did really well and did not need insulin. Insurance will not cover either of those. Here is what we found out previously:  Gwyn Jeannette was denied by [facility]. Must have a 90 day trial of each of the following:     Ayan Ching was denied by [facility], must have a 90 day trial of Jardiance. He has only been on Jardiance since 2-15-23. He is willing to stay on Jardiance and try one of the medications in place of Januvia if it means he can come off of the insulin. Sorry if it is confusing, let me know if you have any questions.

## 2023-03-21 ENCOUNTER — OFFICE VISIT (OUTPATIENT)
Dept: FAMILY MEDICINE CLINIC | Age: 58
End: 2023-03-21
Payer: COMMERCIAL

## 2023-03-21 VITALS
TEMPERATURE: 98.1 F | SYSTOLIC BLOOD PRESSURE: 100 MMHG | WEIGHT: 202 LBS | HEART RATE: 64 BPM | DIASTOLIC BLOOD PRESSURE: 52 MMHG | BODY MASS INDEX: 31.64 KG/M2 | OXYGEN SATURATION: 98 % | RESPIRATION RATE: 16 BRPM

## 2023-03-21 DIAGNOSIS — E11.9 TYPE 2 DIABETES MELLITUS WITHOUT COMPLICATION, WITH LONG-TERM CURRENT USE OF INSULIN (HCC): Primary | ICD-10-CM

## 2023-03-21 DIAGNOSIS — Z79.4 TYPE 2 DIABETES MELLITUS WITHOUT COMPLICATION, WITH LONG-TERM CURRENT USE OF INSULIN (HCC): Primary | ICD-10-CM

## 2023-03-21 PROCEDURE — 1036F TOBACCO NON-USER: CPT | Performed by: NURSE PRACTITIONER

## 2023-03-21 PROCEDURE — G8417 CALC BMI ABV UP PARAM F/U: HCPCS | Performed by: NURSE PRACTITIONER

## 2023-03-21 PROCEDURE — G8427 DOCREV CUR MEDS BY ELIG CLIN: HCPCS | Performed by: NURSE PRACTITIONER

## 2023-03-21 PROCEDURE — 3078F DIAST BP <80 MM HG: CPT | Performed by: NURSE PRACTITIONER

## 2023-03-21 PROCEDURE — 3017F COLORECTAL CA SCREEN DOC REV: CPT | Performed by: NURSE PRACTITIONER

## 2023-03-21 PROCEDURE — G8484 FLU IMMUNIZE NO ADMIN: HCPCS | Performed by: NURSE PRACTITIONER

## 2023-03-21 PROCEDURE — 3074F SYST BP LT 130 MM HG: CPT | Performed by: NURSE PRACTITIONER

## 2023-03-21 PROCEDURE — 2022F DILAT RTA XM EVC RTNOPTHY: CPT | Performed by: NURSE PRACTITIONER

## 2023-03-21 PROCEDURE — 99213 OFFICE O/P EST LOW 20 MIN: CPT | Performed by: NURSE PRACTITIONER

## 2023-03-21 PROCEDURE — 3044F HG A1C LEVEL LT 7.0%: CPT | Performed by: NURSE PRACTITIONER

## 2023-03-21 RX ORDER — BLOOD SUGAR DIAGNOSTIC
1 STRIP MISCELLANEOUS DAILY
Qty: 100 EACH | Refills: 3 | Status: SHIPPED | OUTPATIENT
Start: 2023-03-21

## 2023-03-21 RX ORDER — ERTUGLIFLOZIN 15 MG/1
1 TABLET, FILM COATED ORAL DAILY
Qty: 90 TABLET | Refills: 2 | Status: SHIPPED | OUTPATIENT
Start: 2023-03-21

## 2023-03-21 SDOH — ECONOMIC STABILITY: FOOD INSECURITY: WITHIN THE PAST 12 MONTHS, YOU WORRIED THAT YOUR FOOD WOULD RUN OUT BEFORE YOU GOT MONEY TO BUY MORE.: NEVER TRUE

## 2023-03-21 SDOH — ECONOMIC STABILITY: INCOME INSECURITY: HOW HARD IS IT FOR YOU TO PAY FOR THE VERY BASICS LIKE FOOD, HOUSING, MEDICAL CARE, AND HEATING?: NOT HARD AT ALL

## 2023-03-21 SDOH — ECONOMIC STABILITY: FOOD INSECURITY: WITHIN THE PAST 12 MONTHS, THE FOOD YOU BOUGHT JUST DIDN'T LAST AND YOU DIDN'T HAVE MONEY TO GET MORE.: NEVER TRUE

## 2023-03-21 SDOH — ECONOMIC STABILITY: HOUSING INSECURITY
IN THE LAST 12 MONTHS, WAS THERE A TIME WHEN YOU DID NOT HAVE A STEADY PLACE TO SLEEP OR SLEPT IN A SHELTER (INCLUDING NOW)?: NO

## 2023-03-21 ASSESSMENT — PATIENT HEALTH QUESTIONNAIRE - PHQ9
SUM OF ALL RESPONSES TO PHQ QUESTIONS 1-9: 15
SUM OF ALL RESPONSES TO PHQ QUESTIONS 1-9: 15
3. TROUBLE FALLING OR STAYING ASLEEP: 3
7. TROUBLE CONCENTRATING ON THINGS, SUCH AS READING THE NEWSPAPER OR WATCHING TELEVISION: 2
SUM OF ALL RESPONSES TO PHQ9 QUESTIONS 1 & 2: 4
4. FEELING TIRED OR HAVING LITTLE ENERGY: 3
9. THOUGHTS THAT YOU WOULD BE BETTER OFF DEAD, OR OF HURTING YOURSELF: 0
SUM OF ALL RESPONSES TO PHQ QUESTIONS 1-9: 15
10. IF YOU CHECKED OFF ANY PROBLEMS, HOW DIFFICULT HAVE THESE PROBLEMS MADE IT FOR YOU TO DO YOUR WORK, TAKE CARE OF THINGS AT HOME, OR GET ALONG WITH OTHER PEOPLE: 1
1. LITTLE INTEREST OR PLEASURE IN DOING THINGS: 2
SUM OF ALL RESPONSES TO PHQ QUESTIONS 1-9: 15
6. FEELING BAD ABOUT YOURSELF - OR THAT YOU ARE A FAILURE OR HAVE LET YOURSELF OR YOUR FAMILY DOWN: 3
2. FEELING DOWN, DEPRESSED OR HOPELESS: 2
5. POOR APPETITE OR OVEREATING: 0
8. MOVING OR SPEAKING SO SLOWLY THAT OTHER PEOPLE COULD HAVE NOTICED. OR THE OPPOSITE, BEING SO FIGETY OR RESTLESS THAT YOU HAVE BEEN MOVING AROUND A LOT MORE THAN USUAL: 0

## 2023-03-21 ASSESSMENT — ENCOUNTER SYMPTOMS
EYE DISCHARGE: 0
BACK PAIN: 0
EYE PAIN: 0
ABDOMINAL PAIN: 0
DIARRHEA: 0
EYE REDNESS: 0
COUGH: 0
VOMITING: 0
SORE THROAT: 0
WHEEZING: 0
NAUSEA: 0
TROUBLE SWALLOWING: 0
ALLERGIC/IMMUNOLOGIC NEGATIVE: 1
RHINORRHEA: 0
CONSTIPATION: 0
SHORTNESS OF BREATH: 0

## 2023-03-21 NOTE — TELEPHONE ENCOUNTER
I would have responded directly to the patient if he had put in a [employer] message to me. I am okay taking over his treatment, but I have 1 question before we dive into this, and may be a visit would be helpful for face-to-face communication. I do not know if he has ever been on a GLP-1 such as Ozempic or Trulicity, but with his high level of insulin usage I do not see how he is going to come off of insulin without using a GLP-1. So please find out if he has tried those medications yet and if not that will be our first step as we rearranged the others.

## 2023-03-21 NOTE — TELEPHONE ENCOUNTER
He states any meds that causes Motility causes him to vomit. He is willing to make appt to discuss if needed.

## 2023-03-21 NOTE — PROGRESS NOTES
[address] [facility]  [address] [facility] [address]  Dept: [phone]  Dept Fax: [phone]  Loc: [phone]     Visit Date:  3/21/2023      Patient:  Kirt Somers  YOB: 1965    HPI:     Chief Complaint   Patient presents with    Follow-up     DM         Patient having insurance changes to his medication and needs to discuss it. Would like to try to get off of insulin if at all possible.       Medications    Current Outpatient Medications:     Ertugliflozin L-PyroglutamicAc (STEGLATRO) 15 MG TABS, Take 1 tablet by mouth daily, Disp: 90 tablet, Rfl: 2    Insulin Syringe-Needle U-100 31G X 5/16\" 0.5 ML MISC, 1 each by Does not apply route daily, Disp: 100 each, Rfl: 3    Insulin Pen Needle (PEN NEEDLES) 31G X 6 MM MISC, Use 1 four  times a day, Disp: 400 each, Rfl: 1    LANTUS SOLOSTAR 100 UNIT/ML injection pen, 80 units daily, Disp: 15 Adjustable Dose Pre-filled Pen Syringe, Rfl: 3    ARIPiprazole (ABILIFY) 2 MG tablet, Take 1 tablet by mouth daily, Disp: 30 tablet, Rfl: 0    empagliflozin (JARDIANCE) 25 MG tablet, Take 1 tablet by mouth daily, Disp: 90 tablet, Rfl: 3    FLUoxetine (PROZAC) 40 MG capsule, TAKE 2 CAPSULES BY MOUTH ONCE DAILY, Disp: 180 capsule, Rfl: 1    lisinopril (PRINIVIL;ZESTRIL) 10 MG tablet, take 1 tablet by mouth once daily, Disp: 90 tablet, Rfl: 3    insulin lispro, 1 Unit Dial, (HUMALOG KWIKPEN) 100 UNIT/ML SOPN, Take 4 units at breakfast, 8 units at lunch and 12 units at dinner plus sliding scale for maximum of 50 units a day., Disp: 15 Adjustable Dose Pre-filled Pen Syringe, Rfl: 2    cyclobenzaprine (FLEXERIL) 10 MG tablet, nightly as needed, Disp: , Rfl:     omeprazole (PRILOSEC) 40 MG delayed release capsule, take 1 capsule by mouth daily, Disp: 90 capsule, Rfl: 3    acetaminophen (TYLENOL) 500 MG tablet, Take 1 tablet by mouth every 4 hours as needed for Pain or Fever, Disp: 20 tablet, Rfl: 0    The

## 2023-03-23 DIAGNOSIS — F33.1 MAJOR DEPRESSIVE DISORDER, RECURRENT EPISODE, MODERATE (HCC): ICD-10-CM

## 2023-03-23 RX ORDER — ARIPIPRAZOLE 2 MG/1
TABLET ORAL
Qty: 30 TABLET | Refills: 0 | OUTPATIENT
Start: 2023-03-23

## 2023-03-29 DIAGNOSIS — F33.1 MAJOR DEPRESSIVE DISORDER, RECURRENT EPISODE, MODERATE (HCC): ICD-10-CM

## 2023-03-29 RX ORDER — ARIPIPRAZOLE 2 MG/1
2 TABLET ORAL DAILY
Qty: 30 TABLET | Refills: 0 | Status: SHIPPED | OUTPATIENT
Start: 2023-03-29 | End: 2023-05-01 | Stop reason: SDUPTHER

## 2023-04-18 ENCOUNTER — OFFICE VISIT (OUTPATIENT)
Dept: PULMONOLOGY | Age: 58
End: 2023-04-18
Payer: COMMERCIAL

## 2023-04-18 VITALS
SYSTOLIC BLOOD PRESSURE: 110 MMHG | HEIGHT: 67 IN | TEMPERATURE: 98.8 F | HEART RATE: 57 BPM | DIASTOLIC BLOOD PRESSURE: 60 MMHG | OXYGEN SATURATION: 95 % | BODY MASS INDEX: 32.46 KG/M2 | WEIGHT: 206.8 LBS

## 2023-04-18 DIAGNOSIS — K21.9 GASTROESOPHAGEAL REFLUX DISEASE WITHOUT ESOPHAGITIS: Chronic | ICD-10-CM

## 2023-04-18 DIAGNOSIS — F41.8 DEPRESSION WITH ANXIETY: Chronic | ICD-10-CM

## 2023-04-18 DIAGNOSIS — G47.33 OSA (OBSTRUCTIVE SLEEP APNEA): Primary | ICD-10-CM

## 2023-04-18 DIAGNOSIS — F43.10 PTSD (POST-TRAUMATIC STRESS DISORDER): ICD-10-CM

## 2023-04-18 DIAGNOSIS — I10 PRIMARY HYPERTENSION: Chronic | ICD-10-CM

## 2023-04-18 DIAGNOSIS — Z79.4 TYPE 2 DIABETES MELLITUS WITHOUT COMPLICATION, WITH LONG-TERM CURRENT USE OF INSULIN (HCC): ICD-10-CM

## 2023-04-18 DIAGNOSIS — E66.9 OBESITY (BMI 30.0-34.9): ICD-10-CM

## 2023-04-18 DIAGNOSIS — E11.9 TYPE 2 DIABETES MELLITUS WITHOUT COMPLICATION, WITH LONG-TERM CURRENT USE OF INSULIN (HCC): ICD-10-CM

## 2023-04-18 PROCEDURE — 99204 OFFICE O/P NEW MOD 45 MIN: CPT | Performed by: SPECIALIST

## 2023-04-18 PROCEDURE — 3074F SYST BP LT 130 MM HG: CPT | Performed by: SPECIALIST

## 2023-04-18 PROCEDURE — 3044F HG A1C LEVEL LT 7.0%: CPT | Performed by: SPECIALIST

## 2023-04-18 PROCEDURE — 3078F DIAST BP <80 MM HG: CPT | Performed by: SPECIALIST

## 2023-04-18 NOTE — PROGRESS NOTES
Soft. No distension. No tenderness. Musculoskeletal: Normal range of motion. Lymphadenopathy:  No cervical adenopathy. Neurological: Alert and oriented to person, place, and time. No focal deficits. Skin: Skin is warm and dry. Patient is not diaphoretic. Psychiatric: Normal behavior with normal mood and affect. Diagnostic Data:    Assessment   1. ROCK (obstructive sleep apnea)    2. Obesity (BMI 30.0-34.9)    3. Type 2 diabetes mellitus without complication, with long-term current use of insulin ([address]    4. PTSD (post-traumatic stress disorder)    5. Gastroesophageal reflux disease without esophagitis    6. Primary hypertension    7. Depression with anxiety        Plan   Orders Placed This Encounter   Procedures    Sleep Study with PAP Titration     Standing Status:   Future     Standing Expiration Date:   4/18/2024     Scheduling Instructions:      Need parasomnia montage/ h/o PTSD     Order Specific Question:   Sleep Study Titration Type     Answer:   Split Night Study (Baseline followed by PAP Titration)     Order Specific Question:   Location For Sleep Study     Answer:   [address] [facility]     Order Specific Question:   Select Sleep Lab Location     Answer:    [address]   Will order the parasomnia montage to rule out any other parasomnias including the REM behavior disorder or periodic leg movements and other related. Return in about 3 months (around 7/18/2023). Mask Desensitization and Pre study teaching? Yes  Weight Loss Information Given? Yes  Sleep Hygiene Discussed? Yes  Patient education regarding the sleep disturbances also attached to the MyChart for review.    -He was advised to call [employer] regarding supplies if needed. Discussed with him about the importance of selection of the mask to his comfort.  -He call my office for earlier appointment if needed for worsening of sleep symptoms.  -James Murcia educated about my impression and plan.  Patient verbalizes

## 2023-04-19 ENCOUNTER — TELEPHONE (OUTPATIENT)
Dept: PULMONOLOGY | Age: 58
End: 2023-04-19

## 2023-04-19 NOTE — TELEPHONE ENCOUNTER
Brenda Dubose from [facility] sleep called to let us know that patients sleep studies were too old to get. She states that his sleep studies being back in , were probably paper charts and are too old to obtain.

## 2023-04-23 DIAGNOSIS — F33.1 MAJOR DEPRESSIVE DISORDER, RECURRENT EPISODE, MODERATE (HCC): ICD-10-CM

## 2023-04-25 RX ORDER — ARIPIPRAZOLE 2 MG/1
TABLET ORAL
Qty: 30 TABLET | Refills: 0 | OUTPATIENT
Start: 2023-04-25

## 2023-05-01 ENCOUNTER — OFFICE VISIT (OUTPATIENT)
Dept: PSYCHIATRY | Age: 58
End: 2023-05-01
Payer: COMMERCIAL

## 2023-05-01 DIAGNOSIS — F33.0 MAJOR DEPRESSIVE DISORDER, RECURRENT EPISODE, MILD (HCC): Primary | ICD-10-CM

## 2023-05-01 DIAGNOSIS — F43.10 PTSD (POST-TRAUMATIC STRESS DISORDER): ICD-10-CM

## 2023-05-01 DIAGNOSIS — F41.1 GENERALIZED ANXIETY DISORDER: ICD-10-CM

## 2023-05-01 PROCEDURE — G8417 CALC BMI ABV UP PARAM F/U: HCPCS

## 2023-05-01 PROCEDURE — 3017F COLORECTAL CA SCREEN DOC REV: CPT

## 2023-05-01 PROCEDURE — 1036F TOBACCO NON-USER: CPT

## 2023-05-01 PROCEDURE — 99213 OFFICE O/P EST LOW 20 MIN: CPT

## 2023-05-01 PROCEDURE — G8427 DOCREV CUR MEDS BY ELIG CLIN: HCPCS

## 2023-05-01 RX ORDER — ARIPIPRAZOLE 2 MG/1
2 TABLET ORAL DAILY
Qty: 30 TABLET | Refills: 1 | Status: SHIPPED | OUTPATIENT
Start: 2023-05-01 | End: 2023-06-30

## 2023-05-01 RX ORDER — FLUOXETINE HYDROCHLORIDE 40 MG/1
CAPSULE ORAL
Qty: 180 CAPSULE | Refills: 1 | Status: SHIPPED | OUTPATIENT
Start: 2023-05-01

## 2023-05-01 NOTE — PATIENT INSTRUCTIONS
-Please take medications as prescribed  -Refrain from alcohol or drug use  -Seek emergency help via the emergency and/or calling 911 should symptoms become severe, worsen, or with other concerning symptoms. Go immediately to the emergency room and/or call 911 with any suicidal or homicidal ideations or if audio/visual hallucinations develop.   -Contact office with any questions or concerns. Crisis phone numbers:  Mitch Xavier, and [facility] [phone]. [facility], and [facility] [id number] [place] [address] [phone]. [employer] [phone]. [facility] [address]la [address] [phone].

## 2023-05-01 NOTE — PROGRESS NOTES
[address] [facility] [address]  [facility] [place] [place] [address]  Dept: [phone]  Dept Fax: [phone]  Loc: [phone]    Visit Date: 5/1/2023    SUBJECTIVE DATA     CHIEF COMPLAINT:    Chief Complaint   Patient presents with    Anxiety    Depression    Follow-up         History obtained from: patient    HISTORY OF PRESENT ILLNESS:    Olamide Quinteros is a 59 y.o. male who presents to the office for follow up for medication management of depression and anxiety. Patient reports medication compliance, denies side effects. Reports addition of Abilify at last visit has helped to improve mood. Patient reports depression has significantly improved   -Denies feeling sad and down   -Reports improvement in interests   -Continues to report \"some\" trouble concentrating   -Denies changes in appetite  -Endorses feelings of guilt at times   -Denies feeling hopeless or helpless  -Endorses decreased energy and motivation continues   -Continues to endorses low self-esteem he states has always been an issue  -Denies thoughts of harm to self or others  -Reports depression worsens in the winter months, feels that increase daylight with change of seasons may be helping to improve mood as well     Patient endorses sleep disturbances  -Denies trouble initiating sleep  -Patient continues to endorse trouble maintaining sleep at times   -History of sleep apnea, reports he uses CPAP nightly; is scheduled for a repeat sleep study on May 22nd, he believes his CPAP may not be functioning like it should   -Reports feeling rested \"some mornings\"    Endorses anxiety continues, may be slightly improved   -States he continues to feel anxious in crowed due to having to Clement everyone and what they are doing, and not having control over the situation. \"  -Reports he was in a crowed greenhouse recently and he was having increased anxiety but states he focused on the music and was able

## 2023-05-02 ENCOUNTER — OFFICE VISIT (OUTPATIENT)
Dept: FAMILY MEDICINE CLINIC | Age: 58
End: 2023-05-02
Payer: COMMERCIAL

## 2023-05-02 VITALS
DIASTOLIC BLOOD PRESSURE: 60 MMHG | WEIGHT: 213 LBS | TEMPERATURE: 99.5 F | BODY MASS INDEX: 33.36 KG/M2 | RESPIRATION RATE: 16 BRPM | OXYGEN SATURATION: 97 % | HEART RATE: 64 BPM | SYSTOLIC BLOOD PRESSURE: 102 MMHG

## 2023-05-02 DIAGNOSIS — E11.9 TYPE 2 DIABETES MELLITUS WITHOUT COMPLICATION, WITH LONG-TERM CURRENT USE OF INSULIN (HCC): ICD-10-CM

## 2023-05-02 DIAGNOSIS — Z00.00 ENCOUNTER FOR WELL ADULT EXAM WITHOUT ABNORMAL FINDINGS: Primary | ICD-10-CM

## 2023-05-02 DIAGNOSIS — Z79.4 TYPE 2 DIABETES MELLITUS WITHOUT COMPLICATION, WITH LONG-TERM CURRENT USE OF INSULIN (HCC): ICD-10-CM

## 2023-05-02 PROCEDURE — 3074F SYST BP LT 130 MM HG: CPT | Performed by: NURSE PRACTITIONER

## 2023-05-02 PROCEDURE — 99386 PREV VISIT NEW AGE 40-64: CPT | Performed by: NURSE PRACTITIONER

## 2023-05-02 PROCEDURE — 3078F DIAST BP <80 MM HG: CPT | Performed by: NURSE PRACTITIONER

## 2023-05-02 RX ORDER — BLOOD-GLUCOSE SENSOR
EACH MISCELLANEOUS
COMMUNITY
Start: 2023-04-10

## 2023-05-02 ASSESSMENT — ENCOUNTER SYMPTOMS
NAUSEA: 0
VOMITING: 0
CONSTIPATION: 0
EYE REDNESS: 0
SORE THROAT: 0
DIARRHEA: 0
EYE DISCHARGE: 0
EYE PAIN: 0
ALLERGIC/IMMUNOLOGIC NEGATIVE: 1
BACK PAIN: 0
RHINORRHEA: 0
WHEEZING: 0
SHORTNESS OF BREATH: 0
TROUBLE SWALLOWING: 0
COUGH: 0
ABDOMINAL PAIN: 0
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
## 2023-05-02 NOTE — PROGRESS NOTES
Well Adult Note  Name: Facundo Agosto Date: 2023   MRN: [id number] Sex: Male   Age: 59 y.o. Ethnicity: Non- / Non    : 1965 Race: White (non-)      Dheeraj Gutierrez is here for well adult exam.  History:  Patient continues to struggle with his blood sugar levels due to insurance nonpayment of many diabetic medications. He recently is tried Steglatro but states his blood sugar is completely unaffected and he is now using more insulin every day, up to 80 units twice daily. Review of Systems   Constitutional:  Negative for activity change, fatigue and fever. HENT:  Negative for congestion, ear pain, rhinorrhea, sore throat and trouble swallowing. Eyes:  Negative for pain, discharge and redness. Respiratory:  Negative for cough, shortness of breath and wheezing. Cardiovascular: Negative. Gastrointestinal:  Negative for abdominal pain, constipation, diarrhea, nausea and vomiting. Endocrine: Negative. Genitourinary:  Negative for dysuria, frequency and urgency. Musculoskeletal:  Negative for arthralgias, back pain and myalgias. Skin:  Negative for rash. Allergic/Immunologic: Negative. Neurological:  Negative for dizziness, tremors, weakness and headaches. Hematological: Negative. Psychiatric/Behavioral:  Negative for dysphoric mood and sleep disturbance. The patient is not nervous/anxious. Allergies   Allergen Reactions    Iodides Other (See Comments)    Penicillins Anaphylaxis and Other (See Comments)    Rosuvastatin Itching and Other (See Comments)     Other reaction(s): Rash    Contrast [Gadolinium Derivatives] Rash     Red Man Syndrome         Prior to Visit Medications    Medication Sig Taking?  Authorizing Provider   ARIPiprazole (ABILIFY) 2 MG tablet Take 1 tablet by mouth daily Yes YAMILE Holt CNP   FLUoxetine (PROZAC) 40 MG capsule TAKE 2 CAPSULES BY MOUTH ONCE DAILY Yes YAMILE Holt

## 2023-05-04 ENCOUNTER — NURSE ONLY (OUTPATIENT)
Dept: LAB | Age: 58
End: 2023-05-04

## 2023-05-04 DIAGNOSIS — Z79.4 TYPE 2 DIABETES MELLITUS WITHOUT COMPLICATION, WITH LONG-TERM CURRENT USE OF INSULIN (HCC): ICD-10-CM

## 2023-05-04 DIAGNOSIS — F41.1 GENERALIZED ANXIETY DISORDER: ICD-10-CM

## 2023-05-04 DIAGNOSIS — E11.9 TYPE 2 DIABETES MELLITUS WITHOUT COMPLICATION, WITH LONG-TERM CURRENT USE OF INSULIN (HCC): ICD-10-CM

## 2023-05-04 DIAGNOSIS — F33.1 MAJOR DEPRESSIVE DISORDER, RECURRENT EPISODE, MODERATE (HCC): ICD-10-CM

## 2023-05-04 LAB
CHOLEST SERPL-MCNC: 250 MG/DL (ref 100–199)
DEPRECATED MEAN GLUCOSE BLD GHB EST-ACNC: 144 MG/DL (ref 70–126)
HBA1C MFR BLD HPLC: 6.8 % (ref 4.4–6.4)
HDLC SERPL-MCNC: 52 MG/DL
LDLC SERPL CALC-MCNC: 179 MG/DL
TRIGL SERPL-MCNC: 97 MG/DL (ref 0–199)

## 2023-05-07 LAB — INSULIN HUMAN AB SER-ACNC: NORMAL

## 2023-05-08 LAB — CYCLIC CITRULLINATED PEPTIDE ANTIBODY IGG: 0.8 U/ML (ref 0–7)

## 2023-05-08 RX ORDER — BLOOD-GLUCOSE SENSOR
EACH MISCELLANEOUS
Qty: 2 EACH | Refills: 11 | Status: SHIPPED | OUTPATIENT
Start: 2023-05-08

## 2023-05-21 DIAGNOSIS — F43.10 PTSD (POST-TRAUMATIC STRESS DISORDER): ICD-10-CM

## 2023-05-21 DIAGNOSIS — F41.8 DEPRESSION WITH ANXIETY: ICD-10-CM

## 2023-05-22 RX ORDER — QUETIAPINE FUMARATE 25 MG/1
TABLET, FILM COATED ORAL
Qty: 60 TABLET | Refills: 3 | OUTPATIENT
Start: 2023-05-22

## 2023-06-05 ENCOUNTER — PATIENT MESSAGE (OUTPATIENT)
Dept: FAMILY MEDICINE CLINIC | Age: 58
End: 2023-06-05

## 2023-06-06 NOTE — TELEPHONE ENCOUNTER
Januvia 100mg daily and Farxiga 10mg daily #90/3rf was escribed to [facility]. Originally under [facility] pharmacist was informed to cancel those and Rx's were resent under Dr. Ez Moon. PT [employer] sensors Rx is still good until next May. Pt informed per [employer].

## 2023-06-06 NOTE — TELEPHONE ENCOUNTER
Patient called regarding this- he states he would rather go back on farxiga and Januvia if it can get authorized.     He is currently on Steglatro and jardiance but asked if we can send in Tejal again to [address] and do a PA

## 2023-07-03 RX ORDER — ARIPIPRAZOLE 2 MG/1
TABLET ORAL
Qty: 30 TABLET | Refills: 1 | OUTPATIENT
Start: 2023-07-03

## 2023-07-10 RX ORDER — ARIPIPRAZOLE 2 MG/1
TABLET ORAL
Qty: 30 TABLET | Refills: 1 | OUTPATIENT
Start: 2023-07-10

## 2023-07-17 RX ORDER — ARIPIPRAZOLE 2 MG/1
TABLET ORAL
Qty: 30 TABLET | Refills: 1 | OUTPATIENT
Start: 2023-07-17

## 2023-07-18 ENCOUNTER — TELEMEDICINE (OUTPATIENT)
Dept: PSYCHIATRY | Age: 58
End: 2023-07-18
Payer: COMMERCIAL

## 2023-07-18 DIAGNOSIS — F41.1 GENERALIZED ANXIETY DISORDER: ICD-10-CM

## 2023-07-18 DIAGNOSIS — F33.42 RECURRENT MAJOR DEPRESSIVE DISORDER, IN FULL REMISSION (HCC): Primary | ICD-10-CM

## 2023-07-18 DIAGNOSIS — G47.00 INSOMNIA, UNSPECIFIED TYPE: ICD-10-CM

## 2023-07-18 PROCEDURE — 99214 OFFICE O/P EST MOD 30 MIN: CPT

## 2023-07-18 RX ORDER — ARIPIPRAZOLE 2 MG/1
2 TABLET ORAL DAILY
Qty: 30 TABLET | Refills: 2 | Status: SHIPPED | OUTPATIENT
Start: 2023-07-18 | End: 2023-10-16

## 2023-07-18 RX ORDER — FLUOXETINE HYDROCHLORIDE 40 MG/1
CAPSULE ORAL
Qty: 180 CAPSULE | Refills: 2 | Status: SHIPPED | OUTPATIENT
Start: 2023-07-18

## 2023-07-18 NOTE — PROGRESS NOTES
[address] [address]  Dept: [phone]  Dept Fax: [phone]  Loc: [phone]    Visit Date: 7/18/2023    SUBJECTIVE DATA     CHIEF COMPLAINT:    Chief Complaint   Patient presents with    Anxiety    Depression    Follow-up         History obtained from: patient    TELEPSYCHIATRY VISIT -- Audio/Visual (During [facility]-88 public health emergency)     Brandon Wilkinson, was evaluated through a synchronous (real-time) audio-video encounter. The patient (or guardian if applicable) is aware that this is a billable  service, which includes applicable co-pays. This Virtual Visit was conducted with patient's (and/or legal guardian's) consent. The visit was conducted pursuant to the emergency declaration under the [address] and the [employer] and [employer] General Act. Patient identification was verified, and a caregiver was present when appropriate. The patient was located in a  state where the provider was licensed to provide care. HISTORY OF PRESENT ILLNESS:    Brandon Wilkinson is a 59 y.o. male who presents to the office for follow up for medication management of depression and anxiety. Patient reports medication compliance, denies side effects. Reports patients are working effectively to manage mood and anxiety.     Reports mood is overall stable  -Denies feeling sad or down   -Denies anhedonia  -Denies changes in concentration  -Denies changes in appetite  -Endorses feelings of guilt at times   -Denies feeling hopeless or helpless  -Reports energy and motivation are overall stable  -Continues to endorses low self-esteem he states has always been an issue  -Denies thoughts of harm to self or others    Patient endorses sleep disturbances on occasion  -Denies trouble initiating sleep  -Patient continues to endorse trouble

## 2023-07-30 ENCOUNTER — HOSPITAL ENCOUNTER (OUTPATIENT)
Dept: SLEEP CENTER | Age: 58
Discharge: HOME | End: 2023-08-01
Payer: COMMERCIAL

## 2023-07-30 DIAGNOSIS — Z79.4 TYPE 2 DIABETES MELLITUS WITHOUT COMPLICATION, WITH LONG-TERM CURRENT USE OF INSULIN (HCC): ICD-10-CM

## 2023-07-30 DIAGNOSIS — E11.9 TYPE 2 DIABETES MELLITUS WITHOUT COMPLICATION, WITH LONG-TERM CURRENT USE OF INSULIN (HCC): ICD-10-CM

## 2023-07-30 DIAGNOSIS — G47.33 OSA (OBSTRUCTIVE SLEEP APNEA): ICD-10-CM

## 2023-07-30 DIAGNOSIS — E66.9 OBESITY (BMI 30.0-34.9): ICD-10-CM

## 2023-07-30 DIAGNOSIS — F43.10 PTSD (POST-TRAUMATIC STRESS DISORDER): ICD-10-CM

## 2023-07-30 DIAGNOSIS — F41.8 DEPRESSION WITH ANXIETY: Chronic | ICD-10-CM

## 2023-07-30 PROCEDURE — 95811 POLYSOM 6/>YRS CPAP 4/> PARM: CPT

## 2023-07-31 DIAGNOSIS — E11.9 TYPE 2 DIABETES MELLITUS WITHOUT COMPLICATION, WITH LONG-TERM CURRENT USE OF INSULIN (HCC): Primary | ICD-10-CM

## 2023-07-31 DIAGNOSIS — G47.33 OSA ON CPAP: ICD-10-CM

## 2023-07-31 DIAGNOSIS — F41.8 DEPRESSION WITH ANXIETY: ICD-10-CM

## 2023-07-31 DIAGNOSIS — Z79.4 TYPE 2 DIABETES MELLITUS WITHOUT COMPLICATION, WITH LONG-TERM CURRENT USE OF INSULIN (HCC): Primary | ICD-10-CM

## 2023-07-31 DIAGNOSIS — I10 PRIMARY HYPERTENSION: ICD-10-CM

## 2023-07-31 DIAGNOSIS — Z99.89 OSA ON CPAP: ICD-10-CM

## 2023-07-31 LAB — STATUS: NORMAL

## 2023-08-01 NOTE — PROGRESS NOTES
[place] [address]                               SLEEP STUDY REPORT    PATIENT NAME: Danilo Johnson                      :        1965  MED REC NO:   [id number]                           ROOM:  ACCOUNT NO:   [de-identified]                           ADMIT DATE: 2023  PROVIDER:     Yessica Prado MD    DATE OF STUDY:  2023    REFERRING PROVIDER:  Skinny Ortiz MD    The patient's height is 67 inches, weight is 206 pounds with a BMI of  32.3. HISTORY:  The patient is a 26-year-old gentleman who was initially  evaluated by Dr. Skinny Ortiz on 2023. The patient currently  suffering with hypersomnia with [place] Sleepiness Score of 15. The  patient is having a poor quality sleep. The patient had associated  comorbidities including type 2 diabetes mellitus, PTSD and essential  hypertension, on treatment with medications along with depression. The  patient was scheduled for split-night sleep study to diagnose and treat  sleep apnea. METHODS:  The patient underwent digital polysomnography in compliance  with the standards and specifications from the AASM Manual including the  simultaneous recording of 3 EEG channels (F4-M1, C4-M1, and O2-M1 with  back up electrodes F3-M2, C3-M2, and O1-M2), 2 EOG channels (E1-M2, and  E2-M1,), EMG (chin, left & right leg), EKG, Nonin pulse oximetry with   less than 2 second averaging time, body position, airflow recorded by  oral-nasal thermal sensor and nasal air pressure transducer, plus  respiratory effort recorded by calibrated respiratory inductance  plethysmography (RIP), flow volume loop, sound and video. Sleep staging  and scoring followed the standard put forth by the American Academy of  Sleep Medicine and utilized the 1B obstructive hypopnea event  desaturation of 4 percent or greater.     INTERPRETATION:  This is a split-night sleep study which is
helpful to ensure that the correct pressure has been established.

## 2023-08-03 ENCOUNTER — TELEPHONE (OUTPATIENT)
Dept: SLEEP CENTER | Age: 58
End: 2023-08-03

## 2023-08-16 ENCOUNTER — OFFICE VISIT (OUTPATIENT)
Dept: FAMILY MEDICINE CLINIC | Age: 58
End: 2023-08-16
Payer: COMMERCIAL

## 2023-08-16 VITALS
SYSTOLIC BLOOD PRESSURE: 132 MMHG | OXYGEN SATURATION: 96 % | WEIGHT: 215 LBS | TEMPERATURE: 97.8 F | DIASTOLIC BLOOD PRESSURE: 60 MMHG | RESPIRATION RATE: 16 BRPM | BODY MASS INDEX: 33.67 KG/M2 | HEART RATE: 52 BPM

## 2023-08-16 DIAGNOSIS — E11.9 TYPE 2 DIABETES MELLITUS WITHOUT COMPLICATION, WITH LONG-TERM CURRENT USE OF INSULIN (HCC): ICD-10-CM

## 2023-08-16 DIAGNOSIS — K21.9 GASTROESOPHAGEAL REFLUX DISEASE WITHOUT ESOPHAGITIS: Chronic | ICD-10-CM

## 2023-08-16 DIAGNOSIS — Z79.4 TYPE 2 DIABETES MELLITUS WITHOUT COMPLICATION, WITH LONG-TERM CURRENT USE OF INSULIN (HCC): ICD-10-CM

## 2023-08-16 DIAGNOSIS — R53.83 OTHER FATIGUE: ICD-10-CM

## 2023-08-16 DIAGNOSIS — R35.0 URINARY FREQUENCY: Primary | ICD-10-CM

## 2023-08-16 DIAGNOSIS — R81 GLYCOSURIA: ICD-10-CM

## 2023-08-16 DIAGNOSIS — N32.81 OVERACTIVE BLADDER: ICD-10-CM

## 2023-08-16 LAB
BILIRUBIN, POC: ABNORMAL
BLOOD URINE, POC: ABNORMAL
CLARITY, POC: CLEAR
COLOR, POC: YELLOW
GLUCOSE URINE, POC: >=1000
HBA1C MFR BLD: 7.4 %
KETONES, POC: ABNORMAL
LEUKOCYTE EST, POC: ABNORMAL
NITRITE, POC: ABNORMAL
PH, POC: 7
PROTEIN, POC: ABNORMAL
SPECIFIC GRAVITY, POC: 1.01
UROBILINOGEN, POC: 0.2

## 2023-08-16 PROCEDURE — 3075F SYST BP GE 130 - 139MM HG: CPT | Performed by: NURSE PRACTITIONER

## 2023-08-16 PROCEDURE — 3078F DIAST BP <80 MM HG: CPT | Performed by: NURSE PRACTITIONER

## 2023-08-16 PROCEDURE — 81003 URINALYSIS AUTO W/O SCOPE: CPT | Performed by: NURSE PRACTITIONER

## 2023-08-16 PROCEDURE — 83037 HB GLYCOSYLATED A1C HOME DEV: CPT | Performed by: NURSE PRACTITIONER

## 2023-08-16 PROCEDURE — 3051F HG A1C>EQUAL 7.0%<8.0%: CPT | Performed by: NURSE PRACTITIONER

## 2023-08-16 PROCEDURE — 99214 OFFICE O/P EST MOD 30 MIN: CPT | Performed by: NURSE PRACTITIONER

## 2023-08-16 RX ORDER — OMEPRAZOLE 40 MG/1
40 CAPSULE, DELAYED RELEASE ORAL DAILY
Qty: 90 CAPSULE | Refills: 3 | Status: SHIPPED | OUTPATIENT
Start: 2023-08-16

## 2023-08-16 RX ORDER — DEXAMETHASONE 1 MG
1 TABLET ORAL ONCE
Qty: 1 TABLET | Refills: 0 | Status: SHIPPED | OUTPATIENT
Start: 2023-08-16 | End: 2023-08-16

## 2023-08-16 ASSESSMENT — ENCOUNTER SYMPTOMS
SHORTNESS OF BREATH: 0
TROUBLE SWALLOWING: 0
DIARRHEA: 0
ALLERGIC/IMMUNOLOGIC NEGATIVE: 1
WHEEZING: 0
BACK PAIN: 0
RHINORRHEA: 0
EYE REDNESS: 0
COUGH: 0
ABDOMINAL PAIN: 0
CONSTIPATION: 0
SORE THROAT: 0
NAUSEA: 0
EYE PAIN: 0
VOMITING: 0
EYE DISCHARGE: 0

## 2023-08-17 ENCOUNTER — NURSE ONLY (OUTPATIENT)
Dept: LAB | Age: 58
End: 2023-08-17

## 2023-08-17 DIAGNOSIS — R35.0 URINARY FREQUENCY: ICD-10-CM

## 2023-08-17 DIAGNOSIS — E11.9 TYPE 2 DIABETES MELLITUS WITHOUT COMPLICATION, WITH LONG-TERM CURRENT USE OF INSULIN (HCC): ICD-10-CM

## 2023-08-17 DIAGNOSIS — Z79.4 TYPE 2 DIABETES MELLITUS WITHOUT COMPLICATION, WITH LONG-TERM CURRENT USE OF INSULIN (HCC): ICD-10-CM

## 2023-08-17 DIAGNOSIS — R53.83 OTHER FATIGUE: ICD-10-CM

## 2023-08-17 LAB
25(OH)D3 SERPL-MCNC: 28 NG/ML (ref 30–100)
CORTIS SERPL-MCNC: 0.75 UG/DL
CORTISOL COLLECTION INFO: NORMAL
CREAT UR-MCNC: 83.9 MG/DL
MICROALBUMIN UR-MCNC: < 1.2 MG/DL
MICROALBUMIN/CREAT RATIO PNL UR: 14 MG/G (ref 0–30)
PSA SERPL-MCNC: 0.63 NG/ML (ref 0–1)
T3 TOTAL: 103 NG/DL (ref 60–181)
TSH SERPL DL<=0.005 MIU/L-ACNC: 1.32 UIU/ML (ref 0.4–4.2)
VIT B12 SERPL-MCNC: 575 PG/ML (ref 211–911)

## 2023-08-19 LAB — TESTOSTERONE FREE: NORMAL

## 2023-08-20 LAB — EPSTEIN-BARR VIRUS ANTIBODIES: NORMAL

## 2023-08-23 DIAGNOSIS — E11.9 TYPE 2 DIABETES MELLITUS WITHOUT COMPLICATION, WITH LONG-TERM CURRENT USE OF INSULIN (HCC): ICD-10-CM

## 2023-08-23 DIAGNOSIS — Z79.4 TYPE 2 DIABETES MELLITUS WITHOUT COMPLICATION, WITH LONG-TERM CURRENT USE OF INSULIN (HCC): ICD-10-CM

## 2023-08-23 RX ORDER — INSULIN GLARGINE 100 [IU]/ML
INJECTION, SOLUTION SUBCUTANEOUS
Qty: 15 ADJUSTABLE DOSE PRE-FILLED PEN SYRINGE | Refills: 3 | Status: SHIPPED | OUTPATIENT
Start: 2023-08-23

## 2023-08-28 ENCOUNTER — TELEPHONE (OUTPATIENT)
Dept: FAMILY MEDICINE CLINIC | Age: 58
End: 2023-08-28

## 2023-08-28 DIAGNOSIS — N32.81 OVERACTIVE BLADDER: Primary | ICD-10-CM

## 2023-08-28 LAB — COXSACKIE B PANEL: NORMAL

## 2023-08-28 NOTE — TELEPHONE ENCOUNTER
----- Message from [employer] Alex sent at 8/28/2023  7:40 AM EDT -----  Regarding: Adding Myrbetiq to prescription list and refill  Contact: [phone]  Can you please send a script to [facility] [address] for Myrbetiq. It is helping with my overactive bladder. I greatly appreciate you.     Amber Mcneil

## 2023-09-12 ENCOUNTER — TELEMEDICINE (OUTPATIENT)
Dept: PSYCHIATRY | Age: 58
End: 2023-09-12
Payer: COMMERCIAL

## 2023-09-12 DIAGNOSIS — F33.41 RECURRENT MAJOR DEPRESSIVE DISORDER, IN PARTIAL REMISSION (HCC): Primary | ICD-10-CM

## 2023-09-12 DIAGNOSIS — F41.1 GENERALIZED ANXIETY DISORDER: ICD-10-CM

## 2023-09-12 DIAGNOSIS — Z56.6 STRESS AT WORK: ICD-10-CM

## 2023-09-12 DIAGNOSIS — F43.10 PTSD (POST-TRAUMATIC STRESS DISORDER): ICD-10-CM

## 2023-09-12 PROCEDURE — 99214 OFFICE O/P EST MOD 30 MIN: CPT

## 2023-09-12 RX ORDER — FLUOXETINE HYDROCHLORIDE 40 MG/1
CAPSULE ORAL
Qty: 180 CAPSULE | Refills: 2 | Status: SHIPPED | OUTPATIENT
Start: 2023-09-12

## 2023-09-12 RX ORDER — ARIPIPRAZOLE 2 MG/1
2 TABLET ORAL DAILY
Qty: 30 TABLET | Refills: 2 | Status: SHIPPED | OUTPATIENT
Start: 2023-09-12 | End: 2023-12-11

## 2023-09-12 SDOH — HEALTH STABILITY - MENTAL HEALTH: OTHER PHYSICAL AND MENTAL STRAIN RELATED TO WORK: Z56.6

## 2023-09-12 NOTE — PATIENT INSTRUCTIONS
-Please take medications as prescribed  -Refrain from alcohol or drug use  -Seek emergency help via the emergency and/or calling 911 should symptoms become severe, worsen, or with other concerning symptoms. Go immediately to the emergency room and/or call 911 with any suicidal or homicidal ideations or if audio/visual hallucinations develop.   -Contact office with any questions or concerns. Crisis phone numbers:  Judy Castro, and [facility] [phone]. Ani Holliday and [place] [address] [phone]. [facility] [phone]. [address] [place] [address]. Roula Dent [phone].

## 2023-09-12 NOTE — PROGRESS NOTES
[address] [address]  Dept: [phone]  Dept Fax: [phone]  Loc: [phone]    Visit Date: 9/12/2023    SUBJECTIVE DATA     CHIEF COMPLAINT:    Chief Complaint   Patient presents with    Depression    Anxiety    Follow-up         History obtained from: patient    TELEPSYCHIATRY VISIT -- Audio/Visual (During [facility]-64 public health emergency)     Cely Rod, was evaluated through a synchronous (real-time) audio-video encounter. The patient (or guardian if applicable) is aware that this is a billable  service, which includes applicable co-pays. This Virtual Visit was conducted with patient's (and/or legal guardian's) consent. The visit was conducted pursuant to the emergency declaration under the [address] authority and the [employer] and [employer] General Act. Patient identification was verified, and a caregiver was present when appropriate. The patient was located in a  state where the provider was licensed to provide care. HISTORY OF PRESENT ILLNESS:    Cely Rod is a 59 y.o. male who presents to the office for follow up for medication management of depression and anxiety. Patient reports medication compliance, denies side effects. Reports patients are working effectively to manage mood and anxiety.     Reports mood is overall stable  -Denies feeling sad or down or depressed   -Denies anhedonia  -Denies changes in concentration  -Denies changes in appetite  -Endorses feelings of guilt at times   -Denies feeling hopeless or helpless  -Reports energy and motivation are \"ok\"  -Denies thoughts of harm to self or others    Patient endorses sleep disturbances on occasion  -Denies trouble initiating sleep  -Patient continues to endorse trouble maintaining sleep at times   -History of sleep apnea, he has recently gotten a

## 2023-10-09 RX ORDER — BLOOD-GLUCOSE SENSOR
EACH MISCELLANEOUS
Qty: 2 EACH | Refills: 11 | Status: SHIPPED | OUTPATIENT
Start: 2023-10-09

## 2023-10-13 RX ORDER — ARIPIPRAZOLE 2 MG/1
2 TABLET ORAL DAILY
Qty: 90 TABLET | OUTPATIENT
Start: 2023-10-13

## 2023-11-07 ENCOUNTER — TELEMEDICINE (OUTPATIENT)
Dept: PSYCHIATRY | Age: 58
End: 2023-11-07
Payer: COMMERCIAL

## 2023-11-07 DIAGNOSIS — F43.10 PTSD (POST-TRAUMATIC STRESS DISORDER): ICD-10-CM

## 2023-11-07 DIAGNOSIS — F41.1 GENERALIZED ANXIETY DISORDER: ICD-10-CM

## 2023-11-07 DIAGNOSIS — F33.41 RECURRENT MAJOR DEPRESSIVE DISORDER, IN PARTIAL REMISSION (HCC): Primary | ICD-10-CM

## 2023-11-07 DIAGNOSIS — F43.9 STRESS AT HOME: ICD-10-CM

## 2023-11-07 PROCEDURE — 99214 OFFICE O/P EST MOD 30 MIN: CPT

## 2023-11-07 NOTE — PROGRESS NOTES
[address] [address]  Dept: [phone]  Dept Fax: [phone]: [phone]    Visit Date: 11/7/2023    SUBJECTIVE DATA     CHIEF COMPLAINT:    Chief Complaint   Patient presents with    Depression    Anxiety    Follow-up         History obtained from: patient    TELEPSYCHIATRY VISIT -- Audio/Visual (During [facility]-58 public health emergency)     Jaden Wright, was evaluated through a synchronous (real-time) audio-video encounter. The patient (or guardian if applicable) is aware that this is a billable  service, which includes applicable co-pays. This Virtual Visit was conducted with patient's (and/or legal guardian's) consent. The visit was conducted pursuant to the emergency declaration under the [address] and the [employer] and [employer] General Act. Patient identification was verified, and a caregiver was present when appropriate. The patient was located in a  state where the provider was licensed to provide care. HISTORY OF PRESENT ILLNESS:    Jaden Wright is a 59 y.o. male who presents to the office for follow up for medication management of depression and anxiety. Patient reports medication compliance, denies side effects. Reports patients are working effectively to manage mood and anxiety. Reports mood is overall stable  -Denies feeling sad or down or depressed   -Denies anhedonia  -Denies changes in concentration  -Denies changes in appetite  -Endorses feelings of guilt at times   -Denies feeling hopeless or helpless  -Reports energy and motivation are \"ok, somewhat decreased with recent time change. \"  -Denies thoughts of harm to self or others  -Reports a history of mood worsening during fall/winter months     Patient endorses sleep disturbances on occasion  -Denies trouble initiating

## 2023-11-08 PROBLEM — F43.9 STRESS AT HOME: Status: ACTIVE | Noted: 2023-11-08

## 2023-11-08 RX ORDER — ARIPIPRAZOLE 2 MG/1
2 TABLET ORAL DAILY
Qty: 30 TABLET | Refills: 2 | Status: SHIPPED | OUTPATIENT
Start: 2023-11-08 | End: 2024-02-06

## 2023-11-08 RX ORDER — FLUOXETINE HYDROCHLORIDE 40 MG/1
CAPSULE ORAL
Qty: 180 CAPSULE | Refills: 2 | Status: SHIPPED | OUTPATIENT
Start: 2023-11-08

## 2023-11-08 RX ORDER — TRAZODONE HYDROCHLORIDE 50 MG/1
TABLET ORAL
Qty: 30 TABLET | Refills: 0 | Status: SHIPPED | OUTPATIENT
Start: 2023-11-08

## 2023-11-08 NOTE — PATIENT INSTRUCTIONS
-Please take medications as prescribed  -Refrain from alcohol or drug use  -Seek emergency help via the emergency and/or calling 911 should symptoms become severe, worsen, or with other concerning symptoms. Go immediately to the emergency room and/or call 911 with any suicidal or homicidal ideations or if audio/visual hallucinations develop.   -Contact office with any questions or concerns. Crisis phone numbers:  Gibran Dudley, and On license of [facility] [phone]. Tasha Sorto and [address] [phone]. [facility] [phone]. [address] [place] [address]. Roula Willett [phone].

## 2023-11-14 ENCOUNTER — TELEPHONE (OUTPATIENT)
Dept: PSYCHIATRY | Age: 58
End: 2023-11-14

## 2023-11-14 RX ORDER — ARIPIPRAZOLE 5 MG/1
5 TABLET ORAL DAILY
Qty: 30 TABLET | Refills: 0 | Status: SHIPPED | OUTPATIENT
Start: 2023-11-14 | End: 2023-12-14

## 2023-11-14 NOTE — TELEPHONE ENCOUNTER
I will increase Abilify to 5 mg PO daily. Please move his f/u appointment to roughly 4 weeks from today. Is he having any suicidal/homicidal thoughts? If so I would advise he present to the nearest ER for further evaluation.

## 2023-11-14 NOTE — TELEPHONE ENCOUNTER
Arnav wrote into the office via [employer]:    Good Morning Higinio.      It is more difficult than I thought it would be. Please increase my ability to help me. I would greatly appreciate it    Please advise. He labeled the message-increase in Abilify. He does not return until 01/23/24. He was last seen on 11/07/23.

## 2023-11-15 NOTE — TELEPHONE ENCOUNTER
I spoke with Andrew Jarquin; he was able to read this provider's message from yesterday and notes that he was able to  the Abilify 5mg medication. He is scheduled for 12/12/23 as well to further discuss. He reports no Si/Hi thoughts.

## 2023-11-16 ENCOUNTER — PATIENT MESSAGE (OUTPATIENT)
Dept: FAMILY MEDICINE CLINIC | Age: 58
End: 2023-11-16

## 2023-11-16 DIAGNOSIS — Z79.4 TYPE 2 DIABETES MELLITUS WITHOUT COMPLICATION, WITH LONG-TERM CURRENT USE OF INSULIN (HCC): ICD-10-CM

## 2023-11-16 DIAGNOSIS — E11.9 TYPE 2 DIABETES MELLITUS WITHOUT COMPLICATION, WITH LONG-TERM CURRENT USE OF INSULIN (HCC): ICD-10-CM

## 2023-11-16 RX ORDER — INSULIN LISPRO 100 [IU]/ML
35-45 INJECTION, SOLUTION INTRAVENOUS; SUBCUTANEOUS
Qty: 15 ADJUSTABLE DOSE PRE-FILLED PEN SYRINGE | Refills: 2 | Status: SHIPPED | OUTPATIENT
Start: 2023-11-16

## 2023-11-16 NOTE — TELEPHONE ENCOUNTER
From: Roxana Rangel  To:  Rima Hernandez  Sent: 11/16/2023 9:43 AM EST  Subject: Refill of Humalog    Can you please order humalog quick pen 35-45 units TID to [facility] outpatient pharmacy     Thank you

## 2023-11-27 ENCOUNTER — OFFICE VISIT (OUTPATIENT)
Dept: PULMONOLOGY | Age: 58
End: 2023-11-27
Payer: COMMERCIAL

## 2023-11-27 VITALS
TEMPERATURE: 97.4 F | SYSTOLIC BLOOD PRESSURE: 124 MMHG | OXYGEN SATURATION: 94 % | BODY MASS INDEX: 35.56 KG/M2 | DIASTOLIC BLOOD PRESSURE: 76 MMHG | HEART RATE: 62 BPM | HEIGHT: 67 IN | WEIGHT: 226.6 LBS

## 2023-11-27 DIAGNOSIS — G47.33 OSA ON CPAP: Primary | ICD-10-CM

## 2023-11-27 DIAGNOSIS — E66.9 OBESITY (BMI 35.0-39.9 WITHOUT COMORBIDITY): ICD-10-CM

## 2023-11-27 PROCEDURE — 99213 OFFICE O/P EST LOW 20 MIN: CPT | Performed by: NURSE PRACTITIONER

## 2023-11-27 PROCEDURE — 3078F DIAST BP <80 MM HG: CPT | Performed by: NURSE PRACTITIONER

## 2023-11-27 PROCEDURE — 3074F SYST BP LT 130 MM HG: CPT | Performed by: NURSE PRACTITIONER

## 2023-11-27 ASSESSMENT — ENCOUNTER SYMPTOMS
ALLERGIC/IMMUNOLOGIC NEGATIVE: 1
RESPIRATORY NEGATIVE: 1
NAUSEA: 0
WHEEZING: 0
STRIDOR: 0
EYES NEGATIVE: 1
DIARRHEA: 0
GASTROINTESTINAL NEGATIVE: 1
CHEST TIGHTNESS: 0
VOMITING: 0

## 2023-11-27 NOTE — PROGRESS NOTES
[facility] [address]         [phone]  11/27/2023   Chief Complaint   Patient presents with    Follow-up     6 mo ROCK f/u w download after setup 9/5/23, split night 7/30/23. Pt of Dr. Christine Pereira    PAP Download:   Original or initial AHI: UNKNOWN     Date of initial study: 2007      Compliant  100%     Noncompliant 0%     PAP Type CPAP Level  55peL96   Avg Hrs/Day 8 hours 34 minutes  AHI: 1.8   Recorded compliance dates 10/23/23-11/21/23     Machine/Mfg:   [x] ResMed    [] Respironics/Dreamstation   Interface:   [] Nasal    [] Nasal pillows   [] FFM      Provider:      [x] -JASON     []Lola     [] Brielle    [] Valdemar Watts    [] Doretha               [] [employer]      [] Adaptive    [] [address]:      [] Other    Neck Size: 15  Mallampati 4  ESS:  12  SAQLI: 70    Here is a scan of the most recent download:                    Presentation:   El Byrd presents for sleep medicine follow up for obstructive sleep apnea  Since the last visit, El Byrd has been set up on his CPAP therapy and is doing great with compliancy and is feeling better with therapy. AHI is well controlled   Awake and alert today in the office   BMI 35  Trazodone use at night per psych provider  Patient states he would probably feel better but feels some of his other medications give him fatigue he feels     Equipment issues: The pressure is  acceptable, the mask is acceptable     Sleep issues:  Do you feel better? Yes  More rested? Yes   Better concentration? yes    Progress History:   Since last visit any new medical issues? No  New ER or hospital visits? No  Any new or changes in medicines? No  Any new sleep medicines? No    Review of Systems -   Review of Systems   Constitutional: Negative. Negative for chills, fever and unexpected weight change. HENT: Negative. Eyes: Negative. Respiratory: Negative. Negative for chest tightness, wheezing and stridor.     Cardiovascular:

## 2023-12-04 RX ORDER — TRAZODONE HYDROCHLORIDE 50 MG/1
TABLET ORAL
Qty: 30 TABLET | Refills: 0 | Status: SHIPPED | OUTPATIENT
Start: 2023-12-04 | End: 2023-12-12 | Stop reason: SDUPTHER

## 2023-12-04 RX ORDER — ARIPIPRAZOLE 5 MG/1
5 TABLET ORAL DAILY
Qty: 30 TABLET | Refills: 0 | OUTPATIENT
Start: 2023-12-04 | End: 2024-01-03

## 2023-12-04 NOTE — TELEPHONE ENCOUNTER
Trazodone sent, patient will have enough Abilify to last through 12/14/2023, will refill at his follow up appointment as dose may be adjusted.

## 2023-12-04 NOTE — TELEPHONE ENCOUNTER
Patient is requesting refills of the following medications:    Aripiprazole 5mg  #30 with no refills to the [facility]. Previous Rx sent 11/14/23.    Trazodone 50mg  #30 with no refills to the [facility]. Previous Rx sent 11/08/23.    Last visit 11/07/23  Next visit 12/12/23    Pending your approval for a 30 day supply with no refills.

## 2023-12-05 ENCOUNTER — TELEPHONE (OUTPATIENT)
Dept: FAMILY MEDICINE CLINIC | Age: 58
End: 2023-12-05

## 2023-12-05 NOTE — TELEPHONE ENCOUNTER
Pt states his insurance will not [facility], but will cover DexCom. He is requesting Rx's be sent to [facility]. Last seen TM 8/16/23  FUENTESP.

## 2023-12-06 ENCOUNTER — PATIENT MESSAGE (OUTPATIENT)
Dept: FAMILY MEDICINE CLINIC | Age: 58
End: 2023-12-06

## 2023-12-06 DIAGNOSIS — E11.9 TYPE 2 DIABETES MELLITUS WITHOUT COMPLICATION, WITH LONG-TERM CURRENT USE OF INSULIN (HCC): Primary | ICD-10-CM

## 2023-12-06 DIAGNOSIS — Z79.4 TYPE 2 DIABETES MELLITUS WITHOUT COMPLICATION, WITH LONG-TERM CURRENT USE OF INSULIN (HCC): Primary | ICD-10-CM

## 2023-12-06 RX ORDER — PROCHLORPERAZINE 25 MG/1
1 SUPPOSITORY RECTAL
Qty: 3 EACH | Refills: 5 | Status: SHIPPED | OUTPATIENT
Start: 2023-12-06 | End: 2023-12-08 | Stop reason: CLARIF

## 2023-12-06 RX ORDER — PROCHLORPERAZINE 25 MG/1
1 SUPPOSITORY RECTAL DAILY
Qty: 1 EACH | Refills: 0 | Status: SHIPPED | OUTPATIENT
Start: 2023-12-06 | End: 2023-12-08 | Stop reason: CLARIF

## 2023-12-06 RX ORDER — PROCHLORPERAZINE 25 MG/1
1 SUPPOSITORY RECTAL DAILY
Qty: 1 EACH | Refills: 3 | Status: SHIPPED | OUTPATIENT
Start: 2023-12-06 | End: 2023-12-08 | Stop reason: CLARIF

## 2023-12-08 RX ORDER — BLOOD-GLUCOSE SENSOR
EACH MISCELLANEOUS
Qty: 3 EACH | Refills: 3 | Status: SHIPPED | OUTPATIENT
Start: 2023-12-08

## 2023-12-12 ENCOUNTER — TELEMEDICINE (OUTPATIENT)
Dept: PSYCHIATRY | Age: 58
End: 2023-12-12
Payer: COMMERCIAL

## 2023-12-12 DIAGNOSIS — F43.9 STRESS AT HOME: ICD-10-CM

## 2023-12-12 DIAGNOSIS — F43.10 PTSD (POST-TRAUMATIC STRESS DISORDER): ICD-10-CM

## 2023-12-12 DIAGNOSIS — F41.1 GENERALIZED ANXIETY DISORDER: ICD-10-CM

## 2023-12-12 DIAGNOSIS — G47.00 INSOMNIA, UNSPECIFIED TYPE: ICD-10-CM

## 2023-12-12 DIAGNOSIS — F33.0 MAJOR DEPRESSIVE DISORDER, RECURRENT EPISODE, MILD (HCC): Primary | ICD-10-CM

## 2023-12-12 PROCEDURE — 99214 OFFICE O/P EST MOD 30 MIN: CPT

## 2023-12-12 RX ORDER — ARIPIPRAZOLE 5 MG/1
5 TABLET ORAL DAILY
Qty: 30 TABLET | Refills: 2 | Status: SHIPPED | OUTPATIENT
Start: 2023-12-12 | End: 2024-03-11

## 2023-12-12 RX ORDER — TRAZODONE HYDROCHLORIDE 50 MG/1
TABLET ORAL
Qty: 30 TABLET | Refills: 2 | Status: SHIPPED | OUTPATIENT
Start: 2023-12-12

## 2023-12-12 NOTE — PATIENT INSTRUCTIONS
-Please take medications as prescribed  -Refrain from alcohol or drug use  -Seek emergency help via the emergency and/or calling 911 should symptoms become severe, worsen, or with other concerning symptoms. Go immediately to the emergency room and/or call 911 with any suicidal or homicidal ideations or if audio/visual hallucinations develop.   -Contact office with any questions or concerns. Crisis phone numbers:  Tamra Jay, and [facility] [phone]. Elvie Kunz and [address] [phone]. [facility] [phone]. [address] [place] [address]. Roula Bender [phone].

## 2023-12-12 NOTE — PROGRESS NOTES
BREAST SURGERY  1996    fatty tissue left breat benign    CARDIAC CATHETERIZATION  4-2008    [facility]    SHOULDER SURGERY  2017    SHOULDER SURGERY  11/30/2020        Right shoulder arthroscopy cam procedure extensive debridement, BT, CTR, ulnar nerve decompression                      TONSILLECTOMY  1973    addenoidectomy       PREVIOUSMEDICATIONS:  Outpatient Medications Prior to Visit   Medication Sig Dispense Refill    Continuous Blood Gluc Sensor (FREESTYLE VANESSA 3 SENSOR) MISC USE 1 SENSOR TO CHECK BLOOD SUGAR EVERY 14 DAYS 2 each 11    insulin lispro, 1 Unit Dial, (HUMALOG KWIKPEN) 100 UNIT/ML SOPN Inject 35-45 Units into the skin 3 times daily (before meals) 15 Adjustable Dose Pre-filled Pen Syringe 2    FLUoxetine (PROZAC) 40 MG capsule TAKE 2 CAPSULES BY MOUTH ONCE DAILY 180 capsule 2    mirabegron (MYRBETRIQ) 25 MG TB24 Take 1 tablet by mouth daily 90 tablet 3    LANTUS SOLOSTAR 100 UNIT/ML injection pen 80 units daily 15 Adjustable Dose Pre-filled Pen Syringe 3    omeprazole (PRILOSEC) 40 MG delayed release capsule Take 1 capsule by mouth daily 90 capsule 3    dapagliflozin (FARXIGA) 10 MG tablet Take 1 tablet by mouth every morning 90 tablet 3    SITagliptin (JANUVIA) 100 MG tablet Take 1 tablet by mouth daily 90 tablet 3    Insulin Syringe-Needle U-100 31G X 5/16\" 0.5 ML MISC 1 each by Does not apply route daily 100 each 3    Insulin Pen Needle (PEN NEEDLES) 31G X 6 MM MISC Use 1 four  times a day 400 each 1    lisinopril (PRINIVIL;ZESTRIL) 10 MG tablet take 1 tablet by mouth once daily 90 tablet 3    acetaminophen (TYLENOL) 500 MG tablet Take 1 tablet by mouth every 4 hours as needed for Pain or Fever 20 tablet 0    traZODone (DESYREL) 50 MG tablet Can take 1/2-1 tablet as needed at bedtime for sleep 30 tablet 0    ARIPiprazole (ABILIFY) 5 MG tablet Take 1 tablet by mouth daily 30 tablet 0     No facility-administered medications prior to visit.        ALLERGIES:    Iodides, Penicillins,

## 2024-01-10 DIAGNOSIS — E11.9 TYPE 2 DIABETES MELLITUS WITHOUT COMPLICATION, WITH LONG-TERM CURRENT USE OF INSULIN (HCC): ICD-10-CM

## 2024-01-10 DIAGNOSIS — Z79.4 TYPE 2 DIABETES MELLITUS WITHOUT COMPLICATION, WITH LONG-TERM CURRENT USE OF INSULIN (HCC): ICD-10-CM

## 2024-01-11 RX ORDER — INSULIN GLARGINE 100 [IU]/ML
INJECTION, SOLUTION SUBCUTANEOUS
Qty: 15 ADJUSTABLE DOSE PRE-FILLED PEN SYRINGE | Refills: 3 | Status: SHIPPED | OUTPATIENT
Start: 2024-01-11

## 2024-01-13 ASSESSMENT — PATIENT HEALTH QUESTIONNAIRE - PHQ9
9. THOUGHTS THAT YOU WOULD BE BETTER OFF DEAD, OR OF HURTING YOURSELF: NOT AT ALL
6. FEELING BAD ABOUT YOURSELF - OR THAT YOU ARE A FAILURE OR HAVE LET YOURSELF OR YOUR FAMILY DOWN: 0
2. FEELING DOWN, DEPRESSED OR HOPELESS: NOT AT ALL
SUM OF ALL RESPONSES TO PHQ QUESTIONS 1-9: 3
6. FEELING BAD ABOUT YOURSELF - OR THAT YOU ARE A FAILURE OR HAVE LET YOURSELF OR YOUR FAMILY DOWN: NOT AT ALL
SUM OF ALL RESPONSES TO PHQ QUESTIONS 1-9: 3
5. POOR APPETITE OR OVEREATING: NOT AT ALL
10. IF YOU CHECKED OFF ANY PROBLEMS, HOW DIFFICULT HAVE THESE PROBLEMS MADE IT FOR YOU TO DO YOUR WORK, TAKE CARE OF THINGS AT HOME, OR GET ALONG WITH OTHER PEOPLE: SOMEWHAT DIFFICULT
8. MOVING OR SPEAKING SO SLOWLY THAT OTHER PEOPLE COULD HAVE NOTICED. OR THE OPPOSITE - BEING SO FIDGETY OR RESTLESS THAT YOU HAVE BEEN MOVING AROUND A LOT MORE THAN USUAL: NOT AT ALL
7. TROUBLE CONCENTRATING ON THINGS, SUCH AS READING THE NEWSPAPER OR WATCHING TELEVISION: 0
1. LITTLE INTEREST OR PLEASURE IN DOING THINGS: 1
8. MOVING OR SPEAKING SO SLOWLY THAT OTHER PEOPLE COULD HAVE NOTICED. OR THE OPPOSITE, BEING SO FIGETY OR RESTLESS THAT YOU HAVE BEEN MOVING AROUND A LOT MORE THAN USUAL: 0
10. IF YOU CHECKED OFF ANY PROBLEMS, HOW DIFFICULT HAVE THESE PROBLEMS MADE IT FOR YOU TO DO YOUR WORK, TAKE CARE OF THINGS AT HOME, OR GET ALONG WITH OTHER PEOPLE: 1
5. POOR APPETITE OR OVEREATING: 0
1. LITTLE INTEREST OR PLEASURE IN DOING THINGS: SEVERAL DAYS
SUM OF ALL RESPONSES TO PHQ QUESTIONS 1-9: 3
9. THOUGHTS THAT YOU WOULD BE BETTER OFF DEAD, OR OF HURTING YOURSELF: 0
2. FEELING DOWN, DEPRESSED OR HOPELESS: 0
7. TROUBLE CONCENTRATING ON THINGS, SUCH AS READING THE NEWSPAPER OR WATCHING TELEVISION: NOT AT ALL
3. TROUBLE FALLING OR STAYING ASLEEP: SEVERAL DAYS
SUM OF ALL RESPONSES TO PHQ QUESTIONS 1-9: 3
SUM OF ALL RESPONSES TO PHQ9 QUESTIONS 1 & 2: 1
4. FEELING TIRED OR HAVING LITTLE ENERGY: 1
SUM OF ALL RESPONSES TO PHQ QUESTIONS 1-9: 3
4. FEELING TIRED OR HAVING LITTLE ENERGY: SEVERAL DAYS
3. TROUBLE FALLING OR STAYING ASLEEP: 1

## 2024-01-15 ENCOUNTER — OFFICE VISIT (OUTPATIENT)
Dept: FAMILY MEDICINE CLINIC | Age: 59
End: 2024-01-15
Payer: COMMERCIAL

## 2024-01-15 VITALS
HEART RATE: 57 BPM | BODY MASS INDEX: 34.81 KG/M2 | HEIGHT: 67 IN | SYSTOLIC BLOOD PRESSURE: 122 MMHG | OXYGEN SATURATION: 97 % | DIASTOLIC BLOOD PRESSURE: 60 MMHG | WEIGHT: 221.8 LBS | RESPIRATION RATE: 16 BRPM

## 2024-01-15 DIAGNOSIS — Z00.00 ENCOUNTER FOR WELL ADULT EXAM WITHOUT ABNORMAL FINDINGS: Primary | ICD-10-CM

## 2024-01-15 DIAGNOSIS — E11.9 TYPE 2 DIABETES MELLITUS WITHOUT COMPLICATION, WITH LONG-TERM CURRENT USE OF INSULIN (HCC): ICD-10-CM

## 2024-01-15 DIAGNOSIS — Z79.4 TYPE 2 DIABETES MELLITUS WITHOUT COMPLICATION, WITH LONG-TERM CURRENT USE OF INSULIN (HCC): ICD-10-CM

## 2024-01-15 LAB — HBA1C MFR BLD: 7.2 %

## 2024-01-15 PROCEDURE — 83036 HEMOGLOBIN GLYCOSYLATED A1C: CPT | Performed by: NURSE PRACTITIONER

## 2024-01-15 PROCEDURE — 99396 PREV VISIT EST AGE 40-64: CPT | Performed by: NURSE PRACTITIONER

## 2024-01-15 PROCEDURE — 3078F DIAST BP <80 MM HG: CPT | Performed by: NURSE PRACTITIONER

## 2024-01-15 PROCEDURE — 3074F SYST BP LT 130 MM HG: CPT | Performed by: NURSE PRACTITIONER

## 2024-01-15 ASSESSMENT — ENCOUNTER SYMPTOMS
NAUSEA: 0
VOMITING: 0
BACK PAIN: 0
WHEEZING: 0
ABDOMINAL PAIN: 0
CONSTIPATION: 0
EYE DISCHARGE: 0
SORE THROAT: 0
EYE REDNESS: 0
EYE PAIN: 0
SHORTNESS OF BREATH: 0
COUGH: 0
TROUBLE SWALLOWING: 0
RHINORRHEA: 0
ALLERGIC/IMMUNOLOGIC NEGATIVE: 1
DIARRHEA: 0

## 2024-01-15 NOTE — PROGRESS NOTES
Well Adult Note  Name: Arnav Johnson Today’s Date: 1/15/2024   MRN: [id number] Sex: Male   Age: 58 y.o. Ethnicity: Non- / Non    : 1965 Race: White (non-)      Arnav Johnson is here for well adult exam.  History:  Patient doing well, blood sugars are very stable and level.  He is adjusted his Abilify downward a little bit as he thinks he has developed a slight intention tremor in his right hand.  No new injuries or illnesses to report.    Review of Systems   Constitutional:  Negative for activity change, fatigue and fever.   HENT:  Negative for congestion, ear pain, rhinorrhea, sore throat and trouble swallowing.    Eyes:  Negative for pain, discharge and redness.   Respiratory:  Negative for cough, shortness of breath and wheezing.    Cardiovascular: Negative.    Gastrointestinal:  Negative for abdominal pain, constipation, diarrhea, nausea and vomiting.   Endocrine: Negative.    Genitourinary:  Negative for dysuria, frequency and urgency.   Musculoskeletal:  Negative for arthralgias, back pain and myalgias.   Skin:  Negative for rash.   Allergic/Immunologic: Negative.    Neurological:  Negative for dizziness, tremors, weakness and headaches.   Hematological: Negative.    Psychiatric/Behavioral:  Negative for dysphoric mood and sleep disturbance. The patient is not nervous/anxious.        Allergies   Allergen Reactions    Iodides Other (See Comments)    Penicillins Anaphylaxis and Other (See Comments)    Rosuvastatin Itching and Other (See Comments)     Other reaction(s): Rash    Contrast [Gadolinium Derivatives] Rash     Red Man Syndrome         Prior to Visit Medications    Medication Sig Taking? Authorizing Provider   LANREGINA SOLOSTAR 100 UNIT/ML injection pen 80 units daily Yes Andre Shelton APRN - CNP   traZODone (DESYREL) 50 MG tablet Can take 1/2-1 tablet as needed at bedtime for sleep Yes Beverly Cruz APRN - CNP   ARIPiprazole (ABILIFY) 5 MG tablet Take 1 tablet by

## 2024-01-23 ENCOUNTER — TELEPHONE (OUTPATIENT)
Dept: PSYCHIATRY | Age: 59
End: 2024-01-23

## 2024-01-23 NOTE — TELEPHONE ENCOUNTER
Noted, we can continue the Abilify 2 mg PO daily. I apologize again for running behind today. If he needs a refill or feels meds need adjusted prior to follow up please advise him to reach out to the office.     If having SI, HI, hallucinations or delusions please advise to present to ED.

## 2024-01-23 NOTE — TELEPHONE ENCOUNTER
Arnav wanted to update this provider on his medications since he was unable to keep his appt today (provider running behind and Arnav opted to r/s due to needing to leave for work). He states he had cut back on the Abilify to 2 mg from 5 mg. He states he tried the 5mg dose for approximately one month but it caused him to feel too spaced out.   He went back to the 2mg dose last week and is doing better.

## 2024-01-30 ENCOUNTER — TELEMEDICINE (OUTPATIENT)
Dept: PSYCHIATRY | Age: 59
End: 2024-01-30
Payer: COMMERCIAL

## 2024-01-30 DIAGNOSIS — F41.1 GENERALIZED ANXIETY DISORDER: ICD-10-CM

## 2024-01-30 DIAGNOSIS — I10 ESSENTIAL HYPERTENSION: ICD-10-CM

## 2024-01-30 DIAGNOSIS — F43.9 STRESS AT HOME: ICD-10-CM

## 2024-01-30 DIAGNOSIS — F43.10 PTSD (POST-TRAUMATIC STRESS DISORDER): ICD-10-CM

## 2024-01-30 DIAGNOSIS — F33.0 MAJOR DEPRESSIVE DISORDER, RECURRENT EPISODE, MILD (HCC): Primary | ICD-10-CM

## 2024-01-30 PROCEDURE — 99214 OFFICE O/P EST MOD 30 MIN: CPT

## 2024-01-30 RX ORDER — FLUOXETINE HYDROCHLORIDE 40 MG/1
CAPSULE ORAL
Qty: 180 CAPSULE | Refills: 2 | Status: SHIPPED | OUTPATIENT
Start: 2024-01-30

## 2024-01-30 RX ORDER — LISINOPRIL 10 MG/1
10 TABLET ORAL DAILY
Qty: 90 TABLET | Refills: 3 | Status: SHIPPED | OUTPATIENT
Start: 2024-01-30

## 2024-01-30 NOTE — PROGRESS NOTES
center information. Patient stated understanding and agrees.   Arnav Johnson, was evaluated through a synchronous (real-time) audio-video encounter. The patient (or guardian if applicable) is aware that this is a billable service, which includes applicable co-pays. This Virtual Visit was conducted with patient's (and/or legal guardian's) consent. Patient identification was verified, and a caregiver was present when appropriate.   The patient was located at Home: [address] [place] [address]  Provider was located at Home (Appt Dept State): [place]         Total time spent for this encounter: Not billed by time                 Provider Signature:  Electronically signed by YAMILE Chester CNP on 1/30/2024 at 10:34 AM    **This report has been created using voice recognition software. It may contain minor errors which are inherent in voice recognition technology.**

## 2024-01-30 NOTE — PATIENT INSTRUCTIONS
-Please take medications as prescribed  -Refrain from alcohol or drug use  -Seek emergency help via the emergency and/or calling 911 should symptoms become severe, worsen, or with other concerning symptoms.Go immediately to the emergency room and/or call 911 with any suicidal or homicidal ideations or if audio/visual hallucinations develop.   -Contact office with any questions or concerns.     Crisis phone numbers:  [facility] [phone].  [facility] [phone]  [facility] [phone].  [facility] [phone].  [facility] [phone].  [facility] [phone].

## 2024-03-07 ENCOUNTER — TELEPHONE (OUTPATIENT)
Dept: PULMONOLOGY | Age: 59
End: 2024-03-07

## 2024-03-07 NOTE — TELEPHONE ENCOUNTER
[facility] called regarding this pt getting a Mandibular Retainer. They wanted to know if you thought it would be okay for him to have the retainer or if you thought it would possibly interfere with his CPAP? Please advise, thank you.

## 2024-03-21 RX ORDER — BLOOD-GLUCOSE SENSOR
EACH MISCELLANEOUS
Qty: 2 EACH | Refills: 11 | Status: SHIPPED | OUTPATIENT
Start: 2024-03-21

## 2024-05-18 ENCOUNTER — HOSPITAL ENCOUNTER (EMERGENCY)
Age: 59
Discharge: HOME | End: 2024-05-18
Payer: COMMERCIAL

## 2024-05-18 VITALS
WEIGHT: 217 LBS | OXYGEN SATURATION: 96 % | HEIGHT: 67 IN | SYSTOLIC BLOOD PRESSURE: 170 MMHG | RESPIRATION RATE: 16 BRPM | HEART RATE: 53 BPM | DIASTOLIC BLOOD PRESSURE: 88 MMHG | BODY MASS INDEX: 34.06 KG/M2 | TEMPERATURE: 98 F

## 2024-05-18 DIAGNOSIS — S02.5XXA CLOSED FRACTURE OF TOOTH, INITIAL ENCOUNTER: ICD-10-CM

## 2024-05-18 DIAGNOSIS — K08.89 PAIN, DENTAL: Primary | ICD-10-CM

## 2024-05-18 PROCEDURE — 99213 OFFICE O/P EST LOW 20 MIN: CPT

## 2024-05-18 PROCEDURE — 6370000000 HC RX 637 (ALT 250 FOR IP): Performed by: NURSE PRACTITIONER

## 2024-05-18 PROCEDURE — 99213 OFFICE O/P EST LOW 20 MIN: CPT | Performed by: NURSE PRACTITIONER

## 2024-05-18 RX ORDER — HYDROCODONE BITARTRATE AND ACETAMINOPHEN 5; 325 MG/1; MG/1
1 TABLET ORAL EVERY 6 HOURS PRN
Qty: 10 TABLET | Refills: 0 | Status: SHIPPED | OUTPATIENT
Start: 2024-05-18 | End: 2024-05-21

## 2024-05-18 RX ORDER — LIDOCAINE HYDROCHLORIDE 20 MG/ML
5 SOLUTION OROPHARYNGEAL
Qty: 200 ML | Refills: 0 | Status: SHIPPED | OUTPATIENT
Start: 2024-05-18

## 2024-05-18 RX ORDER — HYDROCODONE BITARTRATE AND ACETAMINOPHEN 5; 325 MG/1; MG/1
1 TABLET ORAL ONCE
Status: COMPLETED | OUTPATIENT
Start: 2024-05-18 | End: 2024-05-18

## 2024-05-18 RX ORDER — CLINDAMYCIN HYDROCHLORIDE 150 MG/1
CAPSULE ORAL
COMMUNITY
Start: 2024-05-16

## 2024-05-18 RX ADMIN — HYDROCODONE BITARTRATE AND ACETAMINOPHEN 1 TABLET: 5; 325 TABLET ORAL at 19:08

## 2024-05-18 ASSESSMENT — ENCOUNTER SYMPTOMS
DIARRHEA: 0
NAUSEA: 0
VOMITING: 0
EYE PAIN: 0
SHORTNESS OF BREATH: 0
BLOOD IN STOOL: 0
SINUS PRESSURE: 0
CONSTIPATION: 0
RHINORRHEA: 0
COUGH: 0
ABDOMINAL PAIN: 0
WHEEZING: 0

## 2024-05-18 ASSESSMENT — PAIN DESCRIPTION - PAIN TYPE: TYPE: ACUTE PAIN

## 2024-05-18 ASSESSMENT — PAIN DESCRIPTION - ONSET: ONSET: ON-GOING

## 2024-05-18 ASSESSMENT — PAIN DESCRIPTION - FREQUENCY: FREQUENCY: CONTINUOUS

## 2024-05-18 ASSESSMENT — PAIN DESCRIPTION - DESCRIPTORS: DESCRIPTORS: THROBBING

## 2024-05-18 ASSESSMENT — PAIN DESCRIPTION - LOCATION: LOCATION: TEETH

## 2024-05-18 ASSESSMENT — PAIN DESCRIPTION - ORIENTATION: ORIENTATION: LEFT

## 2024-05-18 ASSESSMENT — PAIN SCALES - GENERAL: PAINLEVEL_OUTOF10: 7

## 2024-05-18 ASSESSMENT — PAIN - FUNCTIONAL ASSESSMENT: PAIN_FUNCTIONAL_ASSESSMENT: 0-10

## 2024-05-18 NOTE — ED PROVIDER NOTES
[facility] URGENT CARE  UrgentCare Encounter      CHIEFCOMPLAINT       Chief Complaint   Patient presents with    Dental Pain     Left lower        Nurses Notes reviewed and I agree except as noted in the HPI.  HISTORY OF PRESENT ILLNESS   Arnav Johnson is a 58 y.o. male who presents to urgent care with complaint of dental pain and gingival swelling on the left lower side.  Patient states that he had a fractured tooth and had a cap placed over it 2 weeks ago.  He states that he was seen by his dentist about 4 days ago and started on clindamycin for possible infection.  He states that since that time he has continued to have worsening pain despite taking 800 mg ibuprofen and using over-the-counter Tylenol.  He denies other symptoms including fever, difficulty breathing, difficulty swallowing, chest pain or shortness of breath.    REVIEW OF SYSTEMS     Review of Systems   Constitutional:  Negative for appetite change, chills, fatigue, fever and unexpected weight change.   HENT:  Positive for dental problem. Negative for ear pain, rhinorrhea and sinus pressure.    Eyes:  Negative for pain and visual disturbance.   Respiratory:  Negative for cough, shortness of breath and wheezing.    Cardiovascular:  Negative for chest pain, palpitations and leg swelling.   Gastrointestinal:  Negative for abdominal pain, blood in stool, constipation, diarrhea, nausea and vomiting.   Genitourinary:  Negative for dysuria, frequency and hematuria.   Musculoskeletal:  Negative for arthralgias and joint swelling.   Skin:  Negative for rash.   Neurological:  Negative for dizziness, syncope, weakness, light-headedness and headaches.   Hematological:  Does not bruise/bleed easily.       PAST MEDICAL HISTORY         Diagnosis Date    Depression with anxiety     Diabetes mellitus (HCC)     GERD (gastroesophageal reflux disease)     Hyperlipidemia     Hypertension     PTSD (post-traumatic stress disorder)        SURGICAL HISTORY    tablet Take 1 tablet by mouth every 4 hours as needed for Pain or Fever, Disp-20 tablet, R-0Normal             ALLERGIES     Patient is is allergic to iodides, penicillins, rosuvastatin, and contrast [gadolinium derivatives].    FAMILY HISTORY     Patient'sfamily history includes Anxiety Disorder in his mother; Heart Disease in his father and mother; No Known Problems in his sister and sister.    SOCIAL HISTORY     Patient  reports that he quit smoking about 18 years ago. His smoking use included cigarettes. He started smoking about 43 years ago. He has a 25.0 pack-year smoking history. He has never used smokeless tobacco. He reports current alcohol use. He reports that he does not use drugs.    PHYSICAL EXAM     ED TRIAGE VITALS  BP: (!) 170/88, Temp: 98 °F (36.7 °C), Pulse: 53, Respirations: 16, SpO2: 96 %  Physical Exam  Vitals and nursing note reviewed.   Constitutional:       General: He is not in acute distress.     Appearance: He is well-developed. He is not ill-appearing, toxic-appearing or diaphoretic.   HENT:      Head: Normocephalic and atraumatic.      Mouth/Throat:      Dentition: Dental tenderness and gingival swelling present.     Cardiovascular:      Rate and Rhythm: Normal rate.      Heart sounds: No murmur heard.     No gallop.   Pulmonary:      Effort: Pulmonary effort is normal. No respiratory distress.      Breath sounds: No decreased breath sounds, wheezing, rhonchi or rales.   Musculoskeletal:         General: Normal range of motion.      Cervical back: Normal range of motion.   Skin:     General: Skin is warm and dry.   Neurological:      Mental Status: He is alert and oriented to person, place, and time.         DIAGNOSTIC RESULTS   Labs:No results found for this visit on 05/18/24.    IMAGING:  No orders to display     URGENT CARE COURSE:        MDM      Patient presents to urgent care with complaint of dental pain and gingival swelling on the left lower side.  On examination, patient does

## 2024-05-18 NOTE — DISCHARGE INSTRUCTIONS
Go to ER for worsening symptoms, inability to swallow, chest pain, shortness of breath or inability to keep liquids down.  Follow-up with your dentist.  May take Tylenol as needed for pain.

## 2024-05-28 ENCOUNTER — OFFICE VISIT (OUTPATIENT)
Dept: PULMONOLOGY | Age: 59
End: 2024-05-28
Payer: COMMERCIAL

## 2024-05-28 VITALS
TEMPERATURE: 98.7 F | BODY MASS INDEX: 33.68 KG/M2 | SYSTOLIC BLOOD PRESSURE: 116 MMHG | WEIGHT: 214.6 LBS | HEIGHT: 67 IN | DIASTOLIC BLOOD PRESSURE: 60 MMHG | HEART RATE: 52 BPM | OXYGEN SATURATION: 95 %

## 2024-05-28 DIAGNOSIS — G47.33 OSA ON CPAP: Primary | ICD-10-CM

## 2024-05-28 DIAGNOSIS — E66.9 OBESITY (BMI 30.0-34.9): ICD-10-CM

## 2024-05-28 PROCEDURE — 3078F DIAST BP <80 MM HG: CPT | Performed by: NURSE PRACTITIONER

## 2024-05-28 PROCEDURE — 3074F SYST BP LT 130 MM HG: CPT | Performed by: NURSE PRACTITIONER

## 2024-05-28 PROCEDURE — 99214 OFFICE O/P EST MOD 30 MIN: CPT | Performed by: NURSE PRACTITIONER

## 2024-05-28 ASSESSMENT — ENCOUNTER SYMPTOMS
EYES NEGATIVE: 1
DIARRHEA: 0
VOMITING: 0
WHEEZING: 0
GASTROINTESTINAL NEGATIVE: 1
NAUSEA: 0
RESPIRATORY NEGATIVE: 1
STRIDOR: 0
CHEST TIGHTNESS: 0
ALLERGIC/IMMUNOLOGIC NEGATIVE: 1

## 2024-05-28 NOTE — PROGRESS NOTES
[facility]      Arnav Johnson         [id number]  5/28/2024   Chief Complaint   Patient presents with    Follow-up     [facility]OCK 6 month f/u with [facility] download.        Pt of Dr. Bates    PAP Download:   Original or initial AHI: unk     Date of initial study: 2007      Compliant  100%     Noncompliant 0 %     PAP Type CPAP Level  15   Avg Hrs/Day 9 hours 12 minutes  AHI: 0.7   Leaks : 95 th percentile: 13.0   Recorded compliance dates , 4/28/24  to 5/27/24   Machine/Mfg:   [x] ResMed    [] Respironics/Dreamstation   Interface:   [] Nasal    [] Nasal pillows   [x] FFM      Provider:      [x] [facility]     [][facility]     [] [facility]    [] [facility]    [] [facility]               [] [facility]      [] [facility]    [] [place]:      [] Other    Neck Size: 15  Mallampati 4  ESS:  10  SAQLI: 82    Here is a scan of the most recent download:                      Presentation:   Arnav presents for 1 yearsle medicine follow up for obstructive sleep apnea  Since the last visit, Arnav has been compliant with his PAP therapy and continues to see benefit from use.  Awake and alert today   AHI is well controlled   Trazodone use at night per different provider     Progress History:   Since last visit any new medical issues? No  Any trouble with Machine No  Any new sleep medicines? no  Trouble Falling Asleep No  Trouble Staying Asleep No  Snoring no    Equipment issues:  The pressure is  acceptable, the mask is acceptable     Review of Systems -   Review of Systems   Constitutional: Negative.  Negative for chills, fever and unexpected weight change.   HENT: Negative.     Eyes: Negative.    Respiratory: Negative.  Negative for chest tightness, wheezing and stridor.    Cardiovascular:  Negative for chest pain and leg swelling.   Gastrointestinal: Negative.  Negative for diarrhea, nausea and vomiting.   Endocrine: Negative.    Genitourinary: Negative.  Negative for dysuria.   Musculoskeletal:

## 2024-07-01 RX ORDER — DAPAGLIFLOZIN 10 MG/1
10 TABLET, FILM COATED ORAL EVERY MORNING
Qty: 90 TABLET | Refills: 3 | Status: SHIPPED | OUTPATIENT
Start: 2024-07-01

## 2024-08-06 DIAGNOSIS — E11.9 TYPE 2 DIABETES MELLITUS WITHOUT COMPLICATION, WITH LONG-TERM CURRENT USE OF INSULIN (HCC): ICD-10-CM

## 2024-08-06 DIAGNOSIS — Z79.4 TYPE 2 DIABETES MELLITUS WITHOUT COMPLICATION, WITH LONG-TERM CURRENT USE OF INSULIN (HCC): ICD-10-CM

## 2024-08-06 RX ORDER — INSULIN GLARGINE 100 [IU]/ML
INJECTION, SOLUTION SUBCUTANEOUS
Qty: 15 ADJUSTABLE DOSE PRE-FILLED PEN SYRINGE | Refills: 3 | Status: SHIPPED | OUTPATIENT
Start: 2024-08-06

## 2024-08-06 NOTE — TELEPHONE ENCOUNTER
LOV: 1/15/2024   [facility]      Insulin Protocol [id number]/06/2024 01:18 PM   Protocol Details Last creatinine level resulted within the past 12 months    Last HgbA1c resulted within the past 6 months    Visit with authorizing provider in past 6 months or upcoming 90 days

## 2024-09-09 ENCOUNTER — TELEPHONE (OUTPATIENT)
Dept: FAMILY MEDICINE CLINIC | Age: 59
End: 2024-09-09

## 2024-09-10 DIAGNOSIS — Z79.4 TYPE 2 DIABETES MELLITUS WITHOUT COMPLICATION, WITH LONG-TERM CURRENT USE OF INSULIN (HCC): Primary | ICD-10-CM

## 2024-09-10 DIAGNOSIS — E11.9 TYPE 2 DIABETES MELLITUS WITHOUT COMPLICATION, WITH LONG-TERM CURRENT USE OF INSULIN (HCC): Primary | ICD-10-CM

## 2024-09-10 RX ORDER — BLOOD-GLUCOSE SENSOR
1 EACH MISCELLANEOUS
Qty: 6 EACH | Refills: 3 | Status: SHIPPED | OUTPATIENT
Start: 2024-09-10 | End: 2024-10-08

## 2024-09-10 NOTE — ED PROVIDER NOTES
September 10, 2024      Eric Vitale  [address] [address]    Dear Eric:     You have been made inactive on our Liver transplant waitlist as of 9/10/2024. Your waitlist status has changed because:     [x]  Financial/insurance complications     While you are inactive on our transplant waitlist, you will continue to accumulate wait time, but you are currently not eligible for a transplant if a matching organ becomes available for you. Please contact your transplant coordinator for any questions related to your current waitlist status [phone].    Our organ transplant waiting list is overseen by the [employer] ([employer]). [employer] is a national non-profit organization that administers the nation's only transplant organ waiting list.  We are including the [employer] ([employer]) Patient Information Letter, as required. You may call [employer] to discuss problems or concerns you have experienced with any transplant center. Also, we encourage you to ask any member of the Transplant Team here at [facility] to answer any questions you may have.     Please do not hesitate to contact us with any questions or concerns.     Sincerely,      Yaya Hong MD   Abdominal Transplant Program  [facility]    cc: (Not currently on dialysis)     Enclosure: [employer] Patient Information Letter   MEDICAL HISTORY         Diagnosis Date    Depression with anxiety     Diabetes mellitus (HCC)     GERD (gastroesophageal reflux disease)     Hyperlipidemia     Hypertension        SURGICAL HISTORY     Patient  has a past surgical history that includes Breast surgery (1996); Tonsillectomy (1973); Cardiac catheterization (4-2008); and shoulder surgery (2017). CURRENT MEDICATIONS       Previous Medications    ALBUTEROL (PROVENTIL) (2.5 MG/3ML) 0.083% NEBULIZER SOLUTION    Take 3 mLs by nebulization every 6 hours as needed for Wheezing    ALBUTEROL SULFATE (PROAIR RESPICLICK) 709 (90 BASE) MCG/ACT AEROSOL POWDER INHALATION    Inhale 2 puffs into the lungs every 4 hours as needed for Wheezing or Shortness of Breath    ATORVASTATIN (LIPITOR) 40 MG TABLET    Take 1 tablet by mouth daily    BENZONATATE (TESSALON PERLES) 100 MG CAPSULE    Take 1 capsule by mouth 3 times daily as needed for Cough    DAPAGLIFLOZIN (FARXIGA) 10 MG TABLET    Take 1 tablet by mouth every morning    FLUOXETINE (PROZAC) 40 MG CAPSULE    Take 1 capsule by mouth daily    INSULIN LISPRO (HUMALOG KWIKPEN) 100 UNIT/ML PEN    Inject 35-45 Units into the skin 3 times daily (before meals)    INSULIN PEN NEEDLE (PEN NEEDLES) 31G X 6 MM MISC    1 each by Does not apply route 5 times daily    LANTUS SOLOSTAR 100 UNIT/ML INJECTION PEN    Inject 100 Units into the skin nightly    LISINOPRIL (PRINIVIL;ZESTRIL) 10 MG TABLET    Take 1 tablet by mouth daily    OMEPRAZOLE (PRILOSEC) 40 MG DELAYED RELEASE CAPSULE    Take 1 capsule by mouth daily    SITAGLIPTIN (JANUVIA) 100 MG TABLET    Take 1 tablet by mouth daily       ALLERGIES     Patient is is allergic to penicillins; lipitor; and contrast [gadolinium derivatives]. FAMILY HISTORY     Patient's family history is not on file. SOCIAL HISTORY     Patient  reports that he quit smoking about 14 years ago. His smoking use included cigarettes. He started smoking about 39 years ago.  He has a 25.00 pack-year smoking history. He has never used smokeless tobacco. He reports current alcohol use. PHYSICAL EXAM     ED TRIAGE VITALS  BP: (!) 157/74, Temp: 98.6 °F (37 °C), Pulse: 64, Resp: 18, SpO2: 97 %  Physical Exam  Vitals signs and nursing note reviewed. Constitutional:       General: He is awake. He is not in acute distress. Appearance: Normal appearance. He is not ill-appearing, toxic-appearing or diaphoretic. Interventions: He is not intubated. HENT:      Head: Normocephalic and atraumatic. Right Ear: Hearing, tympanic membrane, ear canal and external ear normal.      Left Ear: Hearing, ear canal and external ear normal. Tympanic membrane is bulging. Nose: Rhinorrhea present. Mouth/Throat:      Lips: Pink. No lesions. Mouth: Mucous membranes are moist.      Pharynx: Oropharynx is clear. Uvula midline. Posterior oropharyngeal erythema and uvula swelling present. No pharyngeal swelling or oropharyngeal exudate. Tonsils: No tonsillar exudate or tonsillar abscesses. Eyes:      Extraocular Movements: Extraocular movements intact. Conjunctiva/sclera: Conjunctivae normal.   Pulmonary:      Effort: No tachypnea, bradypnea, accessory muscle usage, prolonged expiration, respiratory distress or retractions. He is not intubated. Breath sounds: No stridor, decreased air movement or transmitted upper airway sounds. Examination of the right-lower field reveals rhonchi. Examination of the left-lower field reveals rhonchi. Rhonchi present. No decreased breath sounds, wheezing or rales. Skin:     General: Skin is warm. Neurological:      General: No focal deficit present. Mental Status: He is alert and oriented to person, place, and time. Psychiatric:         Mood and Affect: Mood normal.         Behavior: Behavior normal. Behavior is cooperative. Thought Content:  Thought content normal.         Judgment: Judgment normal.         DIAGNOSTIC RESULTS   Labs:No results

## 2024-10-14 ENCOUNTER — OFFICE VISIT (OUTPATIENT)
Dept: FAMILY MEDICINE CLINIC | Age: 59
End: 2024-10-14
Payer: COMMERCIAL

## 2024-10-14 VITALS
HEART RATE: 64 BPM | BODY MASS INDEX: 34.3 KG/M2 | WEIGHT: 219 LBS | SYSTOLIC BLOOD PRESSURE: 122 MMHG | TEMPERATURE: 98.7 F | DIASTOLIC BLOOD PRESSURE: 60 MMHG | RESPIRATION RATE: 16 BRPM

## 2024-10-14 DIAGNOSIS — Z79.4 TYPE 2 DIABETES MELLITUS WITHOUT COMPLICATION, WITH LONG-TERM CURRENT USE OF INSULIN (HCC): ICD-10-CM

## 2024-10-14 DIAGNOSIS — J20.9 ACUTE BRONCHITIS, UNSPECIFIED ORGANISM: Primary | ICD-10-CM

## 2024-10-14 DIAGNOSIS — E11.9 TYPE 2 DIABETES MELLITUS WITHOUT COMPLICATION, WITH LONG-TERM CURRENT USE OF INSULIN (HCC): ICD-10-CM

## 2024-10-14 DIAGNOSIS — E11.65 HYPERGLYCEMIA DUE TO DIABETES MELLITUS (HCC): ICD-10-CM

## 2024-10-14 LAB
HBA1C MFR BLD: 8 %
Lab: NORMAL
QC PASS/FAIL: NORMAL
SARS-COV-2 RDRP RESP QL NAA+PROBE: NEGATIVE

## 2024-10-14 PROCEDURE — 3052F HG A1C>EQUAL 8.0%<EQUAL 9.0%: CPT | Performed by: NURSE PRACTITIONER

## 2024-10-14 PROCEDURE — 3074F SYST BP LT 130 MM HG: CPT | Performed by: NURSE PRACTITIONER

## 2024-10-14 PROCEDURE — 87635 SARS-COV-2 COVID-19 AMP PRB: CPT | Performed by: NURSE PRACTITIONER

## 2024-10-14 PROCEDURE — 99214 OFFICE O/P EST MOD 30 MIN: CPT | Performed by: NURSE PRACTITIONER

## 2024-10-14 PROCEDURE — 83036 HEMOGLOBIN GLYCOSYLATED A1C: CPT | Performed by: NURSE PRACTITIONER

## 2024-10-14 PROCEDURE — 3078F DIAST BP <80 MM HG: CPT | Performed by: NURSE PRACTITIONER

## 2024-10-14 RX ORDER — DOXYCYCLINE HYCLATE 100 MG
100 TABLET ORAL 2 TIMES DAILY
Qty: 20 TABLET | Refills: 0 | Status: SHIPPED | OUTPATIENT
Start: 2024-10-14 | End: 2024-10-24

## 2024-10-14 SDOH — ECONOMIC STABILITY: FOOD INSECURITY: WITHIN THE PAST 12 MONTHS, THE FOOD YOU BOUGHT JUST DIDN'T LAST AND YOU DIDN'T HAVE MONEY TO GET MORE.: NEVER TRUE

## 2024-10-14 SDOH — ECONOMIC STABILITY: FOOD INSECURITY: WITHIN THE PAST 12 MONTHS, YOU WORRIED THAT YOUR FOOD WOULD RUN OUT BEFORE YOU GOT MONEY TO BUY MORE.: NEVER TRUE

## 2024-10-14 SDOH — ECONOMIC STABILITY: INCOME INSECURITY: HOW HARD IS IT FOR YOU TO PAY FOR THE VERY BASICS LIKE FOOD, HOUSING, MEDICAL CARE, AND HEATING?: NOT HARD AT ALL

## 2024-10-14 ASSESSMENT — ENCOUNTER SYMPTOMS
EYE REDNESS: 0
SHORTNESS OF BREATH: 1
SORE THROAT: 1
BACK PAIN: 0
ABDOMINAL PAIN: 0
TROUBLE SWALLOWING: 0
COUGH: 1
NAUSEA: 0
EYE PAIN: 0
EYE DISCHARGE: 0
VOMITING: 0
DIARRHEA: 0
CONSTIPATION: 0
RHINORRHEA: 0
WHEEZING: 0
HEARTBURN: 0
ALLERGIC/IMMUNOLOGIC NEGATIVE: 1
HEMOPTYSIS: 0

## 2024-10-14 NOTE — PROGRESS NOTES
[facility] PROFESSIONAL SERVS  [facility]  [address] [id number]  Dept: [phone]  Dept Fax: [phone]  Loc: [phone]     Visit Date:  10/14/2024      Patient:  Arnav Johnson  YOB: 1965    HPI:     Chief Complaint   Patient presents with    Fatigue     Coughing, yellow mucus, headache- symptoms started last Thursday.       Cough  This is a new problem. The current episode started in the past 7 days. The problem has been gradually worsening. The problem occurs every few minutes. The cough is Non-productive. Associated symptoms include chills, ear pain, headaches, myalgias, nasal congestion, a sore throat and shortness of breath. Pertinent negatives include no chest pain, ear congestion, eye redness, fever, heartburn, hemoptysis, postnasal drip, rash, rhinorrhea, sweats, weight loss or wheezing. The symptoms are aggravated by lying down.       Medications    Current Outpatient Medications:     doxycycline hyclate (VIBRA-TABS) 100 MG tablet, Take 1 tablet by mouth 2 times daily for 10 days, Disp: 20 tablet, Rfl: 0    LANTUS SOLOSTAR 100 UNIT/ML injection pen, 80 units daily, Disp: 15 Adjustable Dose Pre-filled Pen Syringe, Rfl: 3    SITagliptin (JANUVIA) 100 MG tablet, Take 1 tablet by mouth daily, Disp: 90 tablet, Rfl: 3    dapagliflozin (FARXIGA) 10 MG tablet, Take 1 tablet by mouth every morning, Disp: 90 tablet, Rfl: 3    clindamycin (CLEOCIN) 150 MG capsule, , Disp: , Rfl:     lidocaine viscous hcl (XYLOCAINE) 2 % SOLN solution, Take 5 mLs by mouth every 3 hours as needed for Dental Pain, Disp: 200 mL, Rfl: 0    Continuous Blood Gluc Sensor (FREESTYLE VANESSA 3 SENSOR) [facility], USE 1 SENSOR TO CHECK BLOOD SUGAR EVERY 14 DAYS, Disp: 2 each, Rfl: 11    FLUoxetine (PROZAC) 40 MG capsule, TAKE 2 CAPSULES BY MOUTH ONCE DAILY, Disp: 180 capsule, Rfl: 2    lisinopril (PRINIVIL;ZESTRIL) 10 MG tablet, Take 1 tablet by mouth daily, Disp: 90 tablet, Rfl:

## 2024-10-28 DIAGNOSIS — K21.9 GASTROESOPHAGEAL REFLUX DISEASE WITHOUT ESOPHAGITIS: Chronic | ICD-10-CM

## 2024-10-28 DIAGNOSIS — Z79.4 TYPE 2 DIABETES MELLITUS WITHOUT COMPLICATION, WITH LONG-TERM CURRENT USE OF INSULIN (HCC): ICD-10-CM

## 2024-10-28 DIAGNOSIS — E11.9 TYPE 2 DIABETES MELLITUS WITHOUT COMPLICATION, WITH LONG-TERM CURRENT USE OF INSULIN (HCC): ICD-10-CM

## 2024-10-28 RX ORDER — INSULIN GLARGINE 100 [IU]/ML
INJECTION, SOLUTION SUBCUTANEOUS
Qty: 15 ADJUSTABLE DOSE PRE-FILLED PEN SYRINGE | Refills: 3 | Status: SHIPPED | OUTPATIENT
Start: 2024-10-28

## 2024-10-28 RX ORDER — DAPAGLIFLOZIN 10 MG/1
10 TABLET, FILM COATED ORAL EVERY MORNING
Qty: 90 TABLET | Refills: 3 | Status: SHIPPED | OUTPATIENT
Start: 2024-10-28

## 2024-10-28 RX ORDER — OMEPRAZOLE 40 MG/1
40 CAPSULE, DELAYED RELEASE ORAL DAILY
Qty: 90 CAPSULE | Refills: 3 | Status: SHIPPED | OUTPATIENT
Start: 2024-10-28

## 2024-12-03 RX ORDER — FLUOXETINE 40 MG/1
CAPSULE ORAL
Qty: 60 CAPSULE | Refills: 0 | Status: SHIPPED | OUTPATIENT
Start: 2024-12-03

## 2024-12-03 NOTE — TELEPHONE ENCOUNTER
Arnav is requesting a medication refill via [facility] for Prozac 40mg;#180 with 2 refills; last sent on 01/30/24. At the time of the original request he did not have an appointment, he has been scheduled for 12/19/24. He has not been seen since 01/2024 with a cancellation and no show in between. He asked if he can have a prescription to cover him until the 19th.     Medication is pending your approval for a 30 day supply with 0 refills; please advise otherwise.

## 2024-12-16 DIAGNOSIS — Z79.4 TYPE 2 DIABETES MELLITUS WITHOUT COMPLICATION, WITH LONG-TERM CURRENT USE OF INSULIN (HCC): ICD-10-CM

## 2024-12-16 DIAGNOSIS — E11.9 TYPE 2 DIABETES MELLITUS WITHOUT COMPLICATION, WITH LONG-TERM CURRENT USE OF INSULIN (HCC): ICD-10-CM

## 2024-12-16 RX ORDER — INSULIN GLARGINE 100 [IU]/ML
INJECTION, SOLUTION SUBCUTANEOUS
Qty: 15 ADJUSTABLE DOSE PRE-FILLED PEN SYRINGE | Refills: 3 | Status: SHIPPED | OUTPATIENT
Start: 2024-12-16

## 2024-12-19 ENCOUNTER — OFFICE VISIT (OUTPATIENT)
Dept: PSYCHIATRY | Age: 59
End: 2024-12-19
Payer: COMMERCIAL

## 2024-12-19 ENCOUNTER — LAB (OUTPATIENT)
Dept: LAB | Age: 59
End: 2024-12-19

## 2024-12-19 DIAGNOSIS — G47.00 INSOMNIA, UNSPECIFIED TYPE: ICD-10-CM

## 2024-12-19 DIAGNOSIS — F33.0 MAJOR DEPRESSIVE DISORDER, RECURRENT EPISODE, MILD (HCC): ICD-10-CM

## 2024-12-19 DIAGNOSIS — F43.10 PTSD (POST-TRAUMATIC STRESS DISORDER): ICD-10-CM

## 2024-12-19 DIAGNOSIS — F41.1 GENERALIZED ANXIETY DISORDER: ICD-10-CM

## 2024-12-19 PROBLEM — F33.41 RECURRENT MAJOR DEPRESSIVE DISORDER, IN PARTIAL REMISSION (HCC): Status: RESOLVED | Noted: 2023-09-12 | Resolved: 2024-12-19

## 2024-12-19 LAB
25(OH)D3 SERPL-MCNC: 79 NG/ML (ref 30–100)
ALBUMIN SERPL BCG-MCNC: 4.3 G/DL (ref 3.5–5.1)
ALP SERPL-CCNC: 90 U/L (ref 38–126)
ALT SERPL W/O P-5'-P-CCNC: 15 U/L (ref 11–66)
ANION GAP SERPL CALC-SCNC: 11 MEQ/L (ref 8–16)
AST SERPL-CCNC: 10 U/L (ref 5–40)
BASOPHILS ABSOLUTE: 0 THOU/MM3 (ref 0–0.1)
BASOPHILS NFR BLD AUTO: 0.6 %
BILIRUB SERPL-MCNC: 0.3 MG/DL (ref 0.3–1.2)
BUN SERPL-MCNC: 16 MG/DL (ref 7–22)
CALCIUM SERPL-MCNC: 9.3 MG/DL (ref 8.5–10.5)
CHLORIDE SERPL-SCNC: 104 MEQ/L (ref 98–111)
CHOLEST SERPL-MCNC: 253 MG/DL (ref 100–199)
CO2 SERPL-SCNC: 23 MEQ/L (ref 23–33)
CREAT SERPL-MCNC: 0.8 MG/DL (ref 0.4–1.2)
DEPRECATED RDW RBC AUTO: 45.9 FL (ref 35–45)
EOSINOPHIL NFR BLD AUTO: 0.6 %
EOSINOPHILS ABSOLUTE: 0 THOU/MM3 (ref 0–0.4)
ERYTHROCYTE [DISTWIDTH] IN BLOOD BY AUTOMATED COUNT: 13.2 % (ref 11.5–14.5)
FOLATE SERPL-MCNC: 14.5 NG/ML (ref 4.8–24.2)
GFR SERPL CREATININE-BSD FRML MDRD: > 90 ML/MIN/1.73M2
GLUCOSE SERPL-MCNC: 116 MG/DL (ref 70–108)
HCT VFR BLD AUTO: 44.6 % (ref 42–52)
HDLC SERPL-MCNC: 39 MG/DL
HGB BLD-MCNC: 14.4 GM/DL (ref 14–18)
IMM GRANULOCYTES # BLD AUTO: 0.01 THOU/MM3 (ref 0–0.07)
IMM GRANULOCYTES NFR BLD AUTO: 0.1 %
LDLC SERPL CALC-MCNC: 190 MG/DL
LYMPHOCYTES ABSOLUTE: 1.4 THOU/MM3 (ref 1–4.8)
LYMPHOCYTES NFR BLD AUTO: 20.5 %
MCH RBC QN AUTO: 30.5 PG (ref 26–33)
MCHC RBC AUTO-ENTMCNC: 32.3 GM/DL (ref 32.2–35.5)
MCV RBC AUTO: 94.5 FL (ref 80–94)
MONOCYTES ABSOLUTE: 0.8 THOU/MM3 (ref 0.4–1.3)
MONOCYTES NFR BLD AUTO: 11.4 %
NEUTROPHILS ABSOLUTE: 4.6 THOU/MM3 (ref 1.8–7.7)
NEUTROPHILS NFR BLD AUTO: 66.8 %
NRBC BLD AUTO-RTO: 0 /100 WBC
PLATELET # BLD AUTO: 304 THOU/MM3 (ref 130–400)
PMV BLD AUTO: 9.8 FL (ref 9.4–12.4)
POTASSIUM SERPL-SCNC: 4.3 MEQ/L (ref 3.5–5.2)
PROT SERPL-MCNC: 6.7 G/DL (ref 6.1–8)
RBC # BLD AUTO: 4.72 MILL/MM3 (ref 4.7–6.1)
SODIUM SERPL-SCNC: 138 MEQ/L (ref 135–145)
TRIGL SERPL-MCNC: 120 MG/DL (ref 0–199)
TSH SERPL DL<=0.005 MIU/L-ACNC: 1.43 UIU/ML (ref 0.4–4.2)
VIT B12 SERPL-MCNC: 383 PG/ML (ref 211–911)
WBC # BLD AUTO: 6.9 THOU/MM3 (ref 4.8–10.8)

## 2024-12-19 PROCEDURE — 99214 OFFICE O/P EST MOD 30 MIN: CPT

## 2024-12-19 ASSESSMENT — PATIENT HEALTH QUESTIONNAIRE - PHQ9
3. TROUBLE FALLING OR STAYING ASLEEP: NEARLY EVERY DAY
SUM OF ALL RESPONSES TO PHQ QUESTIONS 1-9: 16
5. POOR APPETITE OR OVEREATING: MORE THAN HALF THE DAYS
1. LITTLE INTEREST OR PLEASURE IN DOING THINGS: NEARLY EVERY DAY
SUM OF ALL RESPONSES TO PHQ QUESTIONS 1-9: 16
2. FEELING DOWN, DEPRESSED OR HOPELESS: SEVERAL DAYS
8. MOVING OR SPEAKING SO SLOWLY THAT OTHER PEOPLE COULD HAVE NOTICED. OR THE OPPOSITE, BEING SO FIGETY OR RESTLESS THAT YOU HAVE BEEN MOVING AROUND A LOT MORE THAN USUAL: NOT AT ALL
SUM OF ALL RESPONSES TO PHQ QUESTIONS 1-9: 16
10. IF YOU CHECKED OFF ANY PROBLEMS, HOW DIFFICULT HAVE THESE PROBLEMS MADE IT FOR YOU TO DO YOUR WORK, TAKE CARE OF THINGS AT HOME, OR GET ALONG WITH OTHER PEOPLE: VERY DIFFICULT
4. FEELING TIRED OR HAVING LITTLE ENERGY: NEARLY EVERY DAY
SUM OF ALL RESPONSES TO PHQ9 QUESTIONS 1 & 2: 4
7. TROUBLE CONCENTRATING ON THINGS, SUCH AS READING THE NEWSPAPER OR WATCHING TELEVISION: NEARLY EVERY DAY
6. FEELING BAD ABOUT YOURSELF - OR THAT YOU ARE A FAILURE OR HAVE LET YOURSELF OR YOUR FAMILY DOWN: SEVERAL DAYS
9. THOUGHTS THAT YOU WOULD BE BETTER OFF DEAD, OR OF HURTING YOURSELF: NOT AT ALL
SUM OF ALL RESPONSES TO PHQ QUESTIONS 1-9: 16

## 2024-12-19 ASSESSMENT — ANXIETY QUESTIONNAIRES
4. TROUBLE RELAXING: NOT AT ALL
2. NOT BEING ABLE TO STOP OR CONTROL WORRYING: MORE THAN HALF THE DAYS
5. BEING SO RESTLESS THAT IT IS HARD TO SIT STILL: NOT AT ALL
GAD7 TOTAL SCORE: 8
7. FEELING AFRAID AS IF SOMETHING AWFUL MIGHT HAPPEN: MORE THAN HALF THE DAYS
1. FEELING NERVOUS, ANXIOUS, OR ON EDGE: MORE THAN HALF THE DAYS
6. BECOMING EASILY ANNOYED OR IRRITABLE: NOT AT ALL
IF YOU CHECKED OFF ANY PROBLEMS ON THIS QUESTIONNAIRE, HOW DIFFICULT HAVE THESE PROBLEMS MADE IT FOR YOU TO DO YOUR WORK, TAKE CARE OF THINGS AT HOME, OR GET ALONG WITH OTHER PEOPLE: SOMEWHAT DIFFICULT
3. WORRYING TOO MUCH ABOUT DIFFERENT THINGS: MORE THAN HALF THE DAYS

## 2024-12-19 NOTE — PATIENT INSTRUCTIONS
-Please take medications as prescribed  -Refrain from alcohol or drug use  -Seek emergency help via the emergency and/or calling 911 should symptoms become severe, worsen, or with other concerning symptoms.Go immediately to the emergency room and/or call 911 with any suicidal or homicidal ideations or if audio/visual hallucinations develop.   -Contact office with any questions or concerns.     Crisis phone numbers:  [facility] [phone].  [facility] [phone]  [facility] [phone].  [facility] [phone].  [facility] [phone].  [facility] [phone].

## 2024-12-19 NOTE — PROGRESS NOTES
[facility] [place] [facility]  [facility]  [address]  [facility] [address]  Dept: [phone]  Dept Fax: [phone]  Loc: [phone]    Visit Date: 12/19/2024    SUBJECTIVE DATA     CHIEF COMPLAINT:    Chief Complaint   Patient presents with    Anxiety    Depression    Follow-up         History obtained from: patient          HISTORY OF PRESENT ILLNESS:    Arnav Johnson is a 59 y.o. male who presents to the office for follow up for medication management of depression and anxiety. Last seen 1-2024  Patient reports medication compliance, denies side effects.     Reports mood is \"ok\"   -Denies feeling sad or down or depressed   -Denies anhedonia, Reports a decrease in interests   -Reports poor concentration, states he has been more \"forgetful lately.\"   -Denies changes in appetite  -Endorses feelings of guilt at times   -Denies feeling hopeless or helpless  -Reports energy and motivation are low  -Denies thoughts of harm to self or others  -Reports a history of mood worsening during fall/winter months     Sleep  -Denies trouble initiating. Reports trouble maintaining sleep, states he is able to return to sleep without difficulty  -Reports feels rested \"sometimes\"   -He continues working 2nd shift will occasionally get mandated to work 16 hours shifts  -States he continues sleeping about 9-11 hours broken \"I sleep from about 12am to 7 am then I lay back down at 10 am and sleep until about noon.\"  -History of sleep apnea, reports compliance with CPAP       Endorses anxiety is overall stable  -Patient reports worrying at times but states he is able to control worry  -Denies being easily irritated or annoyed  -Reports feeling on edge at times, usually when in public but states he has not been avoiding going out to public places as he was in the past  -Denies recent panic attacks     Support-wife, family     Denies hallucinations, delusions, homicidal ideations or

## 2024-12-26 DIAGNOSIS — F33.1 MODERATE EPISODE OF RECURRENT MAJOR DEPRESSIVE DISORDER (HCC): Primary | ICD-10-CM

## 2024-12-26 NOTE — TELEPHONE ENCOUNTER
PA denied due to quantity limitations. Rx pending as two separate Rx's to see if insurance will cover.     Requested Prescriptions     Pending Prescriptions Disp Refills    VORTIoxetine (TRINTELLIX) 5 MG tablet 9 tablet 0     Sig: Take 1 tablet by mouth daily for 9 days Then increase to 10 mg tablet once daily.    VORTIoxetine (TRINTELLIX) 10 MG TABS tablet 30 tablet 0     Sig: Take 1 tablet by mouth daily       Date of last visit: 12/19/2024  Date of next visit (if applicable):1/16/2025  Pharmacy Name: [facility],  Cari Raymundo MA

## 2025-01-16 ENCOUNTER — OFFICE VISIT (OUTPATIENT)
Dept: PSYCHIATRY | Age: 60
End: 2025-01-16
Payer: COMMERCIAL

## 2025-01-16 ENCOUNTER — TELEPHONE (OUTPATIENT)
Dept: FAMILY MEDICINE CLINIC | Age: 60
End: 2025-01-16

## 2025-01-16 ENCOUNTER — TELEPHONE (OUTPATIENT)
Dept: PSYCHIATRY | Age: 60
End: 2025-01-16

## 2025-01-16 DIAGNOSIS — F43.10 PTSD (POST-TRAUMATIC STRESS DISORDER): ICD-10-CM

## 2025-01-16 DIAGNOSIS — R93.1 ELEVATED CORONARY ARTERY CALCIUM SCORE: Primary | ICD-10-CM

## 2025-01-16 DIAGNOSIS — G47.00 INSOMNIA, UNSPECIFIED TYPE: ICD-10-CM

## 2025-01-16 DIAGNOSIS — F33.1 MODERATE EPISODE OF RECURRENT MAJOR DEPRESSIVE DISORDER (HCC): ICD-10-CM

## 2025-01-16 DIAGNOSIS — F41.1 GENERALIZED ANXIETY DISORDER: Primary | ICD-10-CM

## 2025-01-16 PROCEDURE — 99214 OFFICE O/P EST MOD 30 MIN: CPT

## 2025-01-16 ASSESSMENT — PATIENT HEALTH QUESTIONNAIRE - PHQ9
SUM OF ALL RESPONSES TO PHQ9 QUESTIONS 1 & 2: 4
SUM OF ALL RESPONSES TO PHQ QUESTIONS 1-9: 19
10. IF YOU CHECKED OFF ANY PROBLEMS, HOW DIFFICULT HAVE THESE PROBLEMS MADE IT FOR YOU TO DO YOUR WORK, TAKE CARE OF THINGS AT HOME, OR GET ALONG WITH OTHER PEOPLE: VERY DIFFICULT
2. FEELING DOWN, DEPRESSED OR HOPELESS: MORE THAN HALF THE DAYS
9. THOUGHTS THAT YOU WOULD BE BETTER OFF DEAD, OR OF HURTING YOURSELF: SEVERAL DAYS
SUM OF ALL RESPONSES TO PHQ QUESTIONS 1-9: 19
SUM OF ALL RESPONSES TO PHQ QUESTIONS 1-9: 18
8. MOVING OR SPEAKING SO SLOWLY THAT OTHER PEOPLE COULD HAVE NOTICED. OR THE OPPOSITE, BEING SO FIGETY OR RESTLESS THAT YOU HAVE BEEN MOVING AROUND A LOT MORE THAN USUAL: MORE THAN HALF THE DAYS
3. TROUBLE FALLING OR STAYING ASLEEP: MORE THAN HALF THE DAYS
5. POOR APPETITE OR OVEREATING: MORE THAN HALF THE DAYS
1. LITTLE INTEREST OR PLEASURE IN DOING THINGS: MORE THAN HALF THE DAYS
SUM OF ALL RESPONSES TO PHQ QUESTIONS 1-9: 19
6. FEELING BAD ABOUT YOURSELF - OR THAT YOU ARE A FAILURE OR HAVE LET YOURSELF OR YOUR FAMILY DOWN: MORE THAN HALF THE DAYS
7. TROUBLE CONCENTRATING ON THINGS, SUCH AS READING THE NEWSPAPER OR WATCHING TELEVISION: NEARLY EVERY DAY
4. FEELING TIRED OR HAVING LITTLE ENERGY: NEARLY EVERY DAY

## 2025-01-16 ASSESSMENT — COLUMBIA-SUICIDE SEVERITY RATING SCALE - C-SSRS
2. HAVE YOU ACTUALLY HAD ANY THOUGHTS OF KILLING YOURSELF?: NO
6. HAVE YOU EVER DONE ANYTHING, STARTED TO DO ANYTHING, OR PREPARED TO DO ANYTHING TO END YOUR LIFE?: NO
1. WITHIN THE PAST MONTH, HAVE YOU WISHED YOU WERE DEAD OR WISHED YOU COULD GO TO SLEEP AND NOT WAKE UP?: YES

## 2025-01-16 ASSESSMENT — ANXIETY QUESTIONNAIRES
1. FEELING NERVOUS, ANXIOUS, OR ON EDGE: MORE THAN HALF THE DAYS
GAD7 TOTAL SCORE: 9
IF YOU CHECKED OFF ANY PROBLEMS ON THIS QUESTIONNAIRE, HOW DIFFICULT HAVE THESE PROBLEMS MADE IT FOR YOU TO DO YOUR WORK, TAKE CARE OF THINGS AT HOME, OR GET ALONG WITH OTHER PEOPLE: SOMEWHAT DIFFICULT
6. BECOMING EASILY ANNOYED OR IRRITABLE: SEVERAL DAYS
4. TROUBLE RELAXING: NOT AT ALL
2. NOT BEING ABLE TO STOP OR CONTROL WORRYING: MORE THAN HALF THE DAYS
3. WORRYING TOO MUCH ABOUT DIFFERENT THINGS: MORE THAN HALF THE DAYS
5. BEING SO RESTLESS THAT IT IS HARD TO SIT STILL: NOT AT ALL
7. FEELING AFRAID AS IF SOMETHING AWFUL MIGHT HAPPEN: MORE THAN HALF THE DAYS

## 2025-01-16 NOTE — TELEPHONE ENCOUNTER
Pt informed the referral to Dr. Calzada was faxed. States he has an appointment with  on 1/23/25 at 11:15

## 2025-01-16 NOTE — PATIENT INSTRUCTIONS
-Please take medications as prescribed  -Refrain from alcohol or drug use  -Seek emergency help via the emergency and/or calling 911 should symptoms become severe, worsen, or with other concerning symptoms.Go immediately to the emergency room and/or call 911 with any suicidal or homicidal ideations or if audio/visual hallucinations develop.   -Contact office with any questions or concerns.     Crisis phone numbers:  [facility] [phone].  [facility] [phone]  [facility] [phone].  [facility] [phone].  [facility] [phone].  [facility] [phone].

## 2025-01-16 NOTE — TELEPHONE ENCOUNTER
Thank you, patient thought he was taking 10 mg PO daily. Will increase dose to 10 mg daily and any further titration will be discussed at f/u.

## 2025-01-16 NOTE — TELEPHONE ENCOUNTER
Please clarify with pharmacy, patient stated at today's visit he did  last prescription of Trintellix sent but was only given a 9 tablets and has been taking 10 mg PO daily for about 5 days now. The 15 mg/day was increase in dose after patient tapers and d/c Prozac, but if insurance will not cover 15 mg/day then I will increase to 20 mg daily at f/u visit in 3 weeks and patient can continue 10 mg /day until then.

## 2025-01-16 NOTE — TELEPHONE ENCOUNTER
Patient had CT calcium scoring that showed 70% blockage; patient requesting referral to Dr. Calzada; ok for referral?

## 2025-01-16 NOTE — PROGRESS NOTES
[facility]  [facility]  [address]  [facility] [address]  Dept: [phone]  Dept Fax: [phone]  Loc: [phone]    Visit Date: 1/16/2025    SUBJECTIVE DATA     CHIEF COMPLAINT:    Chief Complaint   Patient presents with    Anxiety    Depression    Follow-up         History obtained from: patient          HISTORY OF PRESENT ILLNESS:    Arnav Johnson is a 59 y.o. male who presents to the office for follow up for medication management of depression and anxiety. Last seen 1-2024  Patient reports medication compliance, denies side effects.   States he just started cross taper of prozac to trintellix \"over the weekend.\"     Reports mood is \"ok\"   -Endorses feeling sad , down and  depressed   -Denies anhedonia, Reports a decrease in interests   -Reports poor concentration, states he continues to struggle with poor memory   -Denies changes in appetite  -Endorses feelings of guilt at times   -Denies feeling hopeless or helpless  -Reports energy and motivation are low  -Reports fleeting passive suicidal ideations states \"but that isn't really anything new, they are always kind of there. They aren't any worse.\" He denies active suicidal ideations, plan or intent. Reports protective factors include - family and \"wanting to live.\"   -Reports a history of mood worsening during fall/winter months     Sleep  -Denies trouble initiating sleep. Reports trouble maintaining sleep, states he is able to return to sleep without difficulty  -Reports feels rested \"sometimes\"   -He continues working 2nd shift, is not getting mandated to work ot due to position change   -States he continues sleeping about 9-11 hours broken \"I sleep from about 12am to 7 am then I lay back down at 10 am and sleep until about noon.\"  -History of sleep apnea, reports compliance with CPAP       Endorses anxiety is overall stable  -Patient reports worrying at times but states he is able to

## 2025-01-16 NOTE — TELEPHONE ENCOUNTER
Francisco Javier called in with questions with patients Trintellix.    Francisco Javier stated he had received a prescription today for Trintellix 10mg to take 1.5 tablets daily. Francisco Javier stated patients insurance will only cover 1 tablet daily.Francisco Javier stated you can't split the 10mg tablets.  Francisco Javier stated also patient didn't  the last Trintellix 10mg prescription that was sent on 12/27/24. Francisco Javier was wondering if this was a increase in dose.    Next visit 02/03/25    Pending your advice

## 2025-02-10 ENCOUNTER — OFFICE VISIT (OUTPATIENT)
Dept: PSYCHIATRY | Age: 60
End: 2025-02-10
Payer: COMMERCIAL

## 2025-02-10 DIAGNOSIS — G47.00 INSOMNIA, UNSPECIFIED TYPE: ICD-10-CM

## 2025-02-10 DIAGNOSIS — F43.10 PTSD (POST-TRAUMATIC STRESS DISORDER): ICD-10-CM

## 2025-02-10 DIAGNOSIS — F33.1 MODERATE EPISODE OF RECURRENT MAJOR DEPRESSIVE DISORDER (HCC): Primary | ICD-10-CM

## 2025-02-10 DIAGNOSIS — F41.1 GENERALIZED ANXIETY DISORDER: ICD-10-CM

## 2025-02-10 DIAGNOSIS — F43.9 STRESS AT HOME: ICD-10-CM

## 2025-02-10 PROCEDURE — 1036F TOBACCO NON-USER: CPT

## 2025-02-10 PROCEDURE — G8427 DOCREV CUR MEDS BY ELIG CLIN: HCPCS

## 2025-02-10 PROCEDURE — 99214 OFFICE O/P EST MOD 30 MIN: CPT

## 2025-02-10 PROCEDURE — 3017F COLORECTAL CA SCREEN DOC REV: CPT

## 2025-02-10 PROCEDURE — G8417 CALC BMI ABV UP PARAM F/U: HCPCS

## 2025-02-10 ASSESSMENT — PATIENT HEALTH QUESTIONNAIRE - PHQ9
2. FEELING DOWN, DEPRESSED OR HOPELESS: MORE THAN HALF THE DAYS
3. TROUBLE FALLING OR STAYING ASLEEP: SEVERAL DAYS
9. THOUGHTS THAT YOU WOULD BE BETTER OFF DEAD, OR OF HURTING YOURSELF: SEVERAL DAYS
7. TROUBLE CONCENTRATING ON THINGS, SUCH AS READING THE NEWSPAPER OR WATCHING TELEVISION: MORE THAN HALF THE DAYS
1. LITTLE INTEREST OR PLEASURE IN DOING THINGS: MORE THAN HALF THE DAYS
SUM OF ALL RESPONSES TO PHQ QUESTIONS 1-9: 13
SUM OF ALL RESPONSES TO PHQ QUESTIONS 1-9: 14
4. FEELING TIRED OR HAVING LITTLE ENERGY: NEARLY EVERY DAY
SUM OF ALL RESPONSES TO PHQ QUESTIONS 1-9: 14
8. MOVING OR SPEAKING SO SLOWLY THAT OTHER PEOPLE COULD HAVE NOTICED. OR THE OPPOSITE, BEING SO FIGETY OR RESTLESS THAT YOU HAVE BEEN MOVING AROUND A LOT MORE THAN USUAL: NOT AT ALL
5. POOR APPETITE OR OVEREATING: SEVERAL DAYS
6. FEELING BAD ABOUT YOURSELF - OR THAT YOU ARE A FAILURE OR HAVE LET YOURSELF OR YOUR FAMILY DOWN: MORE THAN HALF THE DAYS
SUM OF ALL RESPONSES TO PHQ QUESTIONS 1-9: 14
SUM OF ALL RESPONSES TO PHQ9 QUESTIONS 1 & 2: 4
10. IF YOU CHECKED OFF ANY PROBLEMS, HOW DIFFICULT HAVE THESE PROBLEMS MADE IT FOR YOU TO DO YOUR WORK, TAKE CARE OF THINGS AT HOME, OR GET ALONG WITH OTHER PEOPLE: VERY DIFFICULT

## 2025-02-10 ASSESSMENT — ANXIETY QUESTIONNAIRES
4. TROUBLE RELAXING: SEVERAL DAYS
2. NOT BEING ABLE TO STOP OR CONTROL WORRYING: MORE THAN HALF THE DAYS
5. BEING SO RESTLESS THAT IT IS HARD TO SIT STILL: MORE THAN HALF THE DAYS
GAD7 TOTAL SCORE: 13
3. WORRYING TOO MUCH ABOUT DIFFERENT THINGS: MORE THAN HALF THE DAYS
6. BECOMING EASILY ANNOYED OR IRRITABLE: NEARLY EVERY DAY
IF YOU CHECKED OFF ANY PROBLEMS ON THIS QUESTIONNAIRE, HOW DIFFICULT HAVE THESE PROBLEMS MADE IT FOR YOU TO DO YOUR WORK, TAKE CARE OF THINGS AT HOME, OR GET ALONG WITH OTHER PEOPLE: EXTREMELY DIFFICULT
1. FEELING NERVOUS, ANXIOUS, OR ON EDGE: MORE THAN HALF THE DAYS
7. FEELING AFRAID AS IF SOMETHING AWFUL MIGHT HAPPEN: SEVERAL DAYS

## 2025-02-10 ASSESSMENT — COLUMBIA-SUICIDE SEVERITY RATING SCALE - C-SSRS
2. HAVE YOU ACTUALLY HAD ANY THOUGHTS OF KILLING YOURSELF?: NO
1. WITHIN THE PAST MONTH, HAVE YOU WISHED YOU WERE DEAD OR WISHED YOU COULD GO TO SLEEP AND NOT WAKE UP?: YES
6. HAVE YOU EVER DONE ANYTHING, STARTED TO DO ANYTHING, OR PREPARED TO DO ANYTHING TO END YOUR LIFE?: NO

## 2025-02-10 NOTE — PATIENT INSTRUCTIONS
-Please take medications as prescribed  -Refrain from alcohol or drug use  -Seek emergency help via the emergency and/or calling 911 should symptoms become severe, worsen, or with other concerning symptoms.Go immediately to the emergency room and/or call 911 with any suicidal or homicidal ideations or if audio/visual hallucinations develop.   -Contact office with any questions or concerns.     Crisis phone numbers -228 Crisis Line  [facility] [phone].  [facility] [phone]  [facility] [phone].  [facility] [phone].  [facility] [phone].  [facility] [phone].

## 2025-02-10 NOTE — PROGRESS NOTES
Dispense:  14 tablet     Refill:  0         Medications Discontinued During This Encounter   Medication Reason    FLUoxetine (PROZAC) 20 MG capsule Patient Choice    VORTIoxetine (TRINTELLIX) 10 MG TABS tablet REORDER    VORTIoxetine (TRINTELLIX) 5 MG tablet                      Additional orders:  No orders of the defined types were placed in this encounter.      Condition: Patient appears in  no acute distress  Patient endorses symptoms of depression and anxiety have slightly worsened with trintellix.   Patient reports poor quality of sleep, spent time discussing sleep hygiene with patient.  He follows with sleep medicine for treatment of sleep apnea and reports compliance with CPAP.  Discussed the importance of individual psychotherapy and management of anxiety and mood, he declines need at this time. Patient is encouraged to utilize nonpharmacologic coping skills such as deep breathing, guided imagery, guided meditation, muscle relaxation, calming music, and/or journaling.  Advised starting light therapy, provided instructions for 10,000 lux light x 20-30 minutes once daily prior to 10 am to avoid interference with sleep.  Discussed treatment options with patient including, discussed titration of Trintellix as mood/anxiety may not be well controlled on current dose or switching to another SSRI/SNRI. As discussed in HPI he would like to restart Prozac. Discussed possible referral for TMS, he states he would like to consider this option prior to referral being places.  Patient provided with resources on TMS.  Discussed risk of serotonin syndrome with cross taper and educated patient on signs and symptoms of serotonin syndrome and advised patient to present to the nearest ER should these symptoms occurs, patient verbalizes understanding.    Support and reassurance given. All questions answered. Patient stated understanding and is agreeable to treatment plan. Encouraged to call office with any questions or

## 2025-02-12 LAB
A/G RATIO: 1.9 (ref 1.5–2.5)
ALBUMIN: 4.1 G/DL (ref 3.5–5)
ALP BLD-CCNC: 85 IU/L (ref 41–137)
ALT SERPL-CCNC: 33 IU/L (ref 10–40)
ANION GAP SERPL CALCULATED.3IONS-SCNC: 8 MMOL/L (ref 4–12)
AST SERPL-CCNC: 17 IU/L (ref 15–41)
BASOPHILS ABSOLUTE: 100 /CMM (ref 0–200)
BASOPHILS RELATIVE PERCENT: 1 % (ref 0–2)
BILIRUB SERPL-MCNC: 0.3 MG/DL (ref 0.2–1)
BUN BLDV-MCNC: 21 MG/DL (ref 7–20)
CALCIUM SERPL-MCNC: 8.9 MG/DL (ref 8.8–10.5)
CHLORIDE BLD-SCNC: 105 MEQ/L (ref 101–111)
CHOLESTEROL, TOTAL: 229 MG/DL
CHOLESTEROL/HDL RELATIVE RISK: 5.8 (ref 4–5)
CO2: 26 MEQ/L (ref 21–32)
CREAT SERPL-MCNC: 0.91 MG/DL (ref 0.6–1.3)
CREATININE CLEARANCE: >60
DIRECT-LDL / HDL RISK: 4.2
EOSINOPHILS ABSOLUTE: 300 /CMM (ref 0–500)
EOSINOPHILS RELATIVE PERCENT: 3.6 % (ref 0–6)
GLUCOSE: 169 MG/DL (ref 70–110)
HCT VFR BLD CALC: 43 % (ref 40–49)
HDLC SERPL-MCNC: 39 MG/DL
HEMOGLOBIN: 14.2 GM/DL (ref 13.5–16.5)
INR BLD: 0.87 (ref 0.9–1.2)
LDL CHOLESTEROL DIRECT: 167 MG/DL
LYMPHOCYTES ABSOLUTE: 2300 /CMM (ref 1000–4800)
LYMPHOCYTES RELATIVE PERCENT: 29.4 % (ref 15–45)
MAGNESIUM: 2.1 MG/DL (ref 1.8–2.5)
MCH RBC QN AUTO: 30.7 PG (ref 27.5–33)
MCHC RBC AUTO-ENTMCNC: 32.9 GM/DL (ref 33–36)
MCV RBC AUTO: 93.3 CU MIC (ref 80–97)
MONOCYTES ABSOLUTE: 1100 /CMM (ref 0–800)
MONOCYTES RELATIVE PERCENT: 13.6 % (ref 2–10)
NEUTROPHILS ABSOLUTE: 4100 /CMM (ref 1800–7700)
NEUTROPHILS RELATIVE PERCENT: 52.4 % (ref 40–70)
NUCLEATED RBCS: 0.3 /100 WBC
PDW BLD-RTO: 14.2 % (ref 12–16)
PLATELET # BLD: 314 TH/CMM (ref 150–400)
POTASSIUM SERPL-SCNC: 4.2 MEQ/L (ref 3.6–5)
PROTHROMBIN TIME: 10.3 SEC (ref 9.6–13.3)
RBC # BLD: 4.61 MIL/CMM (ref 4.5–6)
SODIUM BLD-SCNC: 139 MEQ/L (ref 135–145)
TOTAL PROTEIN: 6.3 G/DL (ref 6.2–8)
TRIGL SERPL-MCNC: 179 MG/DL
TSH REFLEX: 2.66 MCIU/ML (ref 0.49–4.67)
VLDLC SERPL CALC-MCNC: 35 MG/DL
WBC # BLD: 7.9 TH/CMM (ref 4.4–10.5)

## 2025-02-13 PROBLEM — F33.0 MAJOR DEPRESSIVE DISORDER, RECURRENT EPISODE, MILD (HCC): Status: RESOLVED | Noted: 2023-05-01 | Resolved: 2025-02-13

## 2025-02-13 PROBLEM — F33.1 MODERATE EPISODE OF RECURRENT MAJOR DEPRESSIVE DISORDER (HCC): Status: ACTIVE | Noted: 2025-02-13

## 2025-02-14 LAB
ACTIVATED CLOTTING TIME: 175 SECONDS (ref 74–137)
ACTIVATED CLOTTING TIME: 175 SECONDS (ref 74–137)
ACTIVATED CLOTTING TIME: 273 SECONDS (ref 74–137)

## 2025-03-02 DIAGNOSIS — I10 ESSENTIAL HYPERTENSION: ICD-10-CM

## 2025-03-03 RX ORDER — LISINOPRIL 10 MG/1
10 TABLET ORAL DAILY
Qty: 90 TABLET | Refills: 3 | Status: SHIPPED | OUTPATIENT
Start: 2025-03-03

## 2025-03-07 LAB
ACTIVATED CLOTTING TIME: 175 SECONDS (ref 74–137)
ACTIVATED CLOTTING TIME: 302 SECONDS (ref 74–137)
METER GLUCOSE: 222 MG/DL (ref 70–110)

## 2025-03-10 LAB
ALBUMIN: 4 G/DL (ref 3.5–5)
ALP BLD-CCNC: 79 IU/L (ref 41–137)
ALT SERPL-CCNC: 20 IU/L (ref 10–40)
AST SERPL-CCNC: 15 IU/L (ref 15–41)
BILIRUB SERPL-MCNC: 0.3 MG/DL (ref 0.2–1)
BILIRUBIN DIRECT: 0.1 MG/DL (ref 0.1–0.2)
CHOLESTEROL, TOTAL: 165 MG/DL
CHOLESTEROL/HDL RELATIVE RISK: 4 (ref 4–5)
DIRECT-LDL / HDL RISK: 2.4
HDLC SERPL-MCNC: 41 MG/DL
LDL CHOLESTEROL DIRECT: 101 MG/DL
TOTAL PROTEIN: 6.1 G/DL (ref 6.2–8)
TRIGL SERPL-MCNC: 196 MG/DL
VLDLC SERPL CALC-MCNC: 39 MG/DL

## 2025-03-13 ENCOUNTER — OFFICE VISIT (OUTPATIENT)
Dept: PSYCHIATRY | Age: 60
End: 2025-03-13
Payer: COMMERCIAL

## 2025-03-13 DIAGNOSIS — G47.00 INSOMNIA, UNSPECIFIED TYPE: ICD-10-CM

## 2025-03-13 DIAGNOSIS — F43.9 STRESS AT HOME: ICD-10-CM

## 2025-03-13 DIAGNOSIS — F33.1 MODERATE EPISODE OF RECURRENT MAJOR DEPRESSIVE DISORDER (HCC): Primary | ICD-10-CM

## 2025-03-13 DIAGNOSIS — F41.1 GENERALIZED ANXIETY DISORDER: ICD-10-CM

## 2025-03-13 PROCEDURE — 3017F COLORECTAL CA SCREEN DOC REV: CPT

## 2025-03-13 PROCEDURE — 99214 OFFICE O/P EST MOD 30 MIN: CPT

## 2025-03-13 PROCEDURE — 1036F TOBACCO NON-USER: CPT

## 2025-03-13 PROCEDURE — G8427 DOCREV CUR MEDS BY ELIG CLIN: HCPCS

## 2025-03-13 PROCEDURE — G8417 CALC BMI ABV UP PARAM F/U: HCPCS

## 2025-03-13 RX ORDER — BUSPIRONE HYDROCHLORIDE 5 MG/1
5 TABLET ORAL 2 TIMES DAILY
Qty: 60 TABLET | Refills: 1 | Status: SHIPPED | OUTPATIENT
Start: 2025-03-13 | End: 2025-05-12

## 2025-03-13 RX ORDER — FLUOXETINE HYDROCHLORIDE 40 MG/1
80 CAPSULE ORAL DAILY
Qty: 60 CAPSULE | Refills: 2 | Status: SHIPPED | OUTPATIENT
Start: 2025-03-13 | End: 2025-06-11

## 2025-03-13 ASSESSMENT — PATIENT HEALTH QUESTIONNAIRE - PHQ9
SUM OF ALL RESPONSES TO PHQ QUESTIONS 1-9: 12
2. FEELING DOWN, DEPRESSED OR HOPELESS: MORE THAN HALF THE DAYS
8. MOVING OR SPEAKING SO SLOWLY THAT OTHER PEOPLE COULD HAVE NOTICED. OR THE OPPOSITE, BEING SO FIGETY OR RESTLESS THAT YOU HAVE BEEN MOVING AROUND A LOT MORE THAN USUAL: SEVERAL DAYS
7. TROUBLE CONCENTRATING ON THINGS, SUCH AS READING THE NEWSPAPER OR WATCHING TELEVISION: SEVERAL DAYS
9. THOUGHTS THAT YOU WOULD BE BETTER OFF DEAD, OR OF HURTING YOURSELF: NOT AT ALL
4. FEELING TIRED OR HAVING LITTLE ENERGY: MORE THAN HALF THE DAYS
3. TROUBLE FALLING OR STAYING ASLEEP: MORE THAN HALF THE DAYS
SUM OF ALL RESPONSES TO PHQ QUESTIONS 1-9: 12
6. FEELING BAD ABOUT YOURSELF - OR THAT YOU ARE A FAILURE OR HAVE LET YOURSELF OR YOUR FAMILY DOWN: SEVERAL DAYS
10. IF YOU CHECKED OFF ANY PROBLEMS, HOW DIFFICULT HAVE THESE PROBLEMS MADE IT FOR YOU TO DO YOUR WORK, TAKE CARE OF THINGS AT HOME, OR GET ALONG WITH OTHER PEOPLE: SOMEWHAT DIFFICULT
SUM OF ALL RESPONSES TO PHQ QUESTIONS 1-9: 12
SUM OF ALL RESPONSES TO PHQ QUESTIONS 1-9: 12
5. POOR APPETITE OR OVEREATING: SEVERAL DAYS
1. LITTLE INTEREST OR PLEASURE IN DOING THINGS: MORE THAN HALF THE DAYS

## 2025-03-13 ASSESSMENT — ANXIETY QUESTIONNAIRES
1. FEELING NERVOUS, ANXIOUS, OR ON EDGE: MORE THAN HALF THE DAYS
2. NOT BEING ABLE TO STOP OR CONTROL WORRYING: MORE THAN HALF THE DAYS
4. TROUBLE RELAXING: MORE THAN HALF THE DAYS
6. BECOMING EASILY ANNOYED OR IRRITABLE: MORE THAN HALF THE DAYS
IF YOU CHECKED OFF ANY PROBLEMS ON THIS QUESTIONNAIRE, HOW DIFFICULT HAVE THESE PROBLEMS MADE IT FOR YOU TO DO YOUR WORK, TAKE CARE OF THINGS AT HOME, OR GET ALONG WITH OTHER PEOPLE: VERY DIFFICULT
5. BEING SO RESTLESS THAT IT IS HARD TO SIT STILL: MORE THAN HALF THE DAYS
3. WORRYING TOO MUCH ABOUT DIFFERENT THINGS: MORE THAN HALF THE DAYS
7. FEELING AFRAID AS IF SOMETHING AWFUL MIGHT HAPPEN: SEVERAL DAYS
GAD7 TOTAL SCORE: 13

## 2025-03-13 NOTE — PROGRESS NOTES
(bakgisella)     OBJECTIVE DATA     There were no vitals taken for this visit.    Wt Readings from Last 3 Encounters:   10/14/24 99.3 kg (219 lb)   05/28/24 97.3 kg (214 lb 9.6 oz)   05/18/24 98.4 kg (217 lb)          Mental Status Evaluation:   Orientation: Alert, oriented, thought content appropriate   Mood:. Anxious and Depressed      Affect:  Mood Congruent      Appearance:  Casually Dressed and Clean   Activity:  Cooperative and Good Eye Contact   Gait/Posture: Normal   Speech:  Clear, Fluent, Normal Pitch and Volume, Age and Situation Appropriate   Thought Process:  Within Normal Limits   Thought Content:  Within Normal Limits   Cognition:  Grossly Intact   Memory: Intact   Insight:  Age Appropriate   Judgment: Age Appropriate   Suicidal Ideations: Denies Suicidal Ideation   Homicidal Ideations: Negative for homicidal ideation   Medication Side Effects: Absent       Attention Span Attention span and concentration were age appropriate       Screenings Completed in This Encounter:     Anxiety and Depression:           PHQ-2 Over the past 2 weeks, how often have you been bothered by any of the following problems?  Little interest or pleasure in doing things: More than half the days  Feeling down, depressed, or hopeless: More than half the days  PHQ-2 Score: 4  PHQ-9 Over the past 2 weeks, how often have you been bothered by any of the following problems?  Trouble falling or staying asleep, or sleeping too much: More than half the days  Feeling tired or having little energy: More than half the days  Poor appetite or overeating: Several days  Feeling bad about yourself - or that you are a failure or have let yourself or your family down: Several days  Trouble concentrating on things, such as reading the newspaper or watching television: Several days  Moving or speaking so slowly that other people could have noticed. Or the opposite - being so fidgety or restless that you have been moving around a lot more than usual:

## 2025-03-13 NOTE — PATIENT INSTRUCTIONS
-Please take medications as prescribed  -Refrain from alcohol or drug use  -Seek emergency help via the emergency and/or calling 911 should symptoms become severe, worsen, or with other concerning symptoms.Go immediately to the emergency room and/or call 911 with any suicidal or homicidal ideations or if audio/visual hallucinations develop.   -Contact office with any questions or concerns.     Crisis phone numbers -061 Crisis Line  [facility] [phone].  [facility] [phone]  [facility] [phone].  [facility] [phone].  [facility] [phone].  [facility] [phone].

## 2025-04-01 DIAGNOSIS — E11.9 TYPE 2 DIABETES MELLITUS WITHOUT COMPLICATION, WITH LONG-TERM CURRENT USE OF INSULIN: Primary | ICD-10-CM

## 2025-04-01 DIAGNOSIS — Z79.4 TYPE 2 DIABETES MELLITUS WITHOUT COMPLICATION, WITH LONG-TERM CURRENT USE OF INSULIN: Primary | ICD-10-CM

## 2025-04-01 RX ORDER — HYDROCHLOROTHIAZIDE 12.5 MG/1
CAPSULE ORAL
Qty: 6 EACH | Refills: 3 | Status: SHIPPED | OUTPATIENT
Start: 2025-04-01

## 2025-04-10 DIAGNOSIS — Z79.4 TYPE 2 DIABETES MELLITUS WITHOUT COMPLICATION, WITH LONG-TERM CURRENT USE OF INSULIN: ICD-10-CM

## 2025-04-10 DIAGNOSIS — E11.9 TYPE 2 DIABETES MELLITUS WITHOUT COMPLICATION, WITH LONG-TERM CURRENT USE OF INSULIN: ICD-10-CM

## 2025-04-10 RX ORDER — DAPAGLIFLOZIN 10 MG/1
10 TABLET, FILM COATED ORAL EVERY MORNING
Qty: 90 TABLET | Refills: 3 | OUTPATIENT
Start: 2025-04-10

## 2025-04-10 RX ORDER — INSULIN GLARGINE 100 [IU]/ML
INJECTION, SOLUTION SUBCUTANEOUS
Qty: 15 ADJUSTABLE DOSE PRE-FILLED PEN SYRINGE | Refills: 0 | Status: SHIPPED | OUTPATIENT
Start: 2025-04-10

## 2025-04-10 RX ORDER — INSULIN LISPRO 100 [IU]/ML
35-45 INJECTION, SOLUTION INTRAVENOUS; SUBCUTANEOUS
Qty: 15 ADJUSTABLE DOSE PRE-FILLED PEN SYRINGE | Refills: 0 | Status: SHIPPED | OUTPATIENT
Start: 2025-04-10

## 2025-04-10 NOTE — TELEPHONE ENCOUNTER
Last seen 10/14/24 for bronchitis. That office visit states to follow up in 6 months, no appt scheduled.     Farxiga and Januvia were filled 10/28/24 for 1 year. Those 2 request were refused.     Lantus and Humalog Rx are pending, sent pt a message to schedule an appt.

## 2025-04-16 ENCOUNTER — OFFICE VISIT (OUTPATIENT)
Dept: FAMILY MEDICINE CLINIC | Age: 60
End: 2025-04-16
Payer: COMMERCIAL

## 2025-04-16 VITALS
SYSTOLIC BLOOD PRESSURE: 110 MMHG | HEART RATE: 52 BPM | WEIGHT: 225 LBS | TEMPERATURE: 98.6 F | DIASTOLIC BLOOD PRESSURE: 54 MMHG | RESPIRATION RATE: 16 BRPM | BODY MASS INDEX: 35.24 KG/M2

## 2025-04-16 DIAGNOSIS — Z79.4 TYPE 2 DIABETES MELLITUS WITHOUT COMPLICATION, WITH LONG-TERM CURRENT USE OF INSULIN: ICD-10-CM

## 2025-04-16 DIAGNOSIS — Z00.00 WELL ADULT EXAM: Primary | ICD-10-CM

## 2025-04-16 DIAGNOSIS — E11.9 TYPE 2 DIABETES MELLITUS WITHOUT COMPLICATION, WITH LONG-TERM CURRENT USE OF INSULIN: ICD-10-CM

## 2025-04-16 LAB
CREAT UR-MCNC: 66.5 MG/DL
HBA1C MFR BLD: 8.4 %
MICROALBUMIN UR-MCNC: < 2 MG/DL
MICROALBUMIN/CREAT RATIO PNL UR: 30 MG/G (ref 0–30)

## 2025-04-16 PROCEDURE — 83036 HEMOGLOBIN GLYCOSYLATED A1C: CPT | Performed by: NURSE PRACTITIONER

## 2025-04-16 PROCEDURE — 99396 PREV VISIT EST AGE 40-64: CPT | Performed by: NURSE PRACTITIONER

## 2025-04-16 PROCEDURE — 3078F DIAST BP <80 MM HG: CPT | Performed by: NURSE PRACTITIONER

## 2025-04-16 PROCEDURE — 3074F SYST BP LT 130 MM HG: CPT | Performed by: NURSE PRACTITIONER

## 2025-04-16 RX ORDER — INSULIN GLARGINE 100 [IU]/ML
INJECTION, SOLUTION SUBCUTANEOUS
Qty: 15 ADJUSTABLE DOSE PRE-FILLED PEN SYRINGE | Refills: 2 | Status: SHIPPED | OUTPATIENT
Start: 2025-04-16

## 2025-04-16 RX ORDER — FENOFIBRATE 145 MG/1
145 TABLET, COATED ORAL DAILY
Qty: 90 TABLET | Refills: 3 | Status: CANCELLED | OUTPATIENT
Start: 2025-04-16 | End: 2026-04-11

## 2025-04-16 RX ORDER — INSULIN LISPRO 100 [IU]/ML
35-45 INJECTION, SOLUTION INTRAVENOUS; SUBCUTANEOUS
Qty: 15 ADJUSTABLE DOSE PRE-FILLED PEN SYRINGE | Refills: 2 | Status: SHIPPED | OUTPATIENT
Start: 2025-04-16

## 2025-04-16 SDOH — ECONOMIC STABILITY: TRANSPORTATION INSECURITY
IN THE PAST 12 MONTHS, HAS THE LACK OF TRANSPORTATION KEPT YOU FROM MEDICAL APPOINTMENTS OR FROM GETTING MEDICATIONS?: NO

## 2025-04-16 SDOH — ECONOMIC STABILITY: INCOME INSECURITY: IN THE LAST 12 MONTHS, WAS THERE A TIME WHEN YOU WERE NOT ABLE TO PAY THE MORTGAGE OR RENT ON TIME?: NO

## 2025-04-16 SDOH — ECONOMIC STABILITY: TRANSPORTATION INSECURITY
IN THE PAST 12 MONTHS, HAS LACK OF TRANSPORTATION KEPT YOU FROM MEETINGS, WORK, OR FROM GETTING THINGS NEEDED FOR DAILY LIVING?: NO

## 2025-04-16 SDOH — ECONOMIC STABILITY: FOOD INSECURITY: WITHIN THE PAST 12 MONTHS, YOU WORRIED THAT YOUR FOOD WOULD RUN OUT BEFORE YOU GOT MONEY TO BUY MORE.: NEVER TRUE

## 2025-04-16 SDOH — ECONOMIC STABILITY: FOOD INSECURITY: WITHIN THE PAST 12 MONTHS, THE FOOD YOU BOUGHT JUST DIDN'T LAST AND YOU DIDN'T HAVE MONEY TO GET MORE.: NEVER TRUE

## 2025-04-16 ASSESSMENT — ENCOUNTER SYMPTOMS
TROUBLE SWALLOWING: 0
EYE PAIN: 0
EYE REDNESS: 0
CONSTIPATION: 0
EYE DISCHARGE: 0
ABDOMINAL PAIN: 0
SHORTNESS OF BREATH: 0
WHEEZING: 0
NAUSEA: 0
RHINORRHEA: 0
DIARRHEA: 0
VOMITING: 0
SORE THROAT: 0
COUGH: 0
BACK PAIN: 0
ALLERGIC/IMMUNOLOGIC NEGATIVE: 1

## 2025-04-16 NOTE — PROGRESS NOTES
[facility]  [address] [id number]  Dept: [phone]  Dept Fax: [phone]  Loc: [phone]     Visit Date:  4/16/2025      Patient:  Arnav Johnson  YOB: 1965    HPI:     Chief Complaint   Patient presents with    6 Month Follow-Up    Health Maintenance     Due for diabetic foot exam and microalbumin lab. Done diabetic eye exam this past year at [facility] on [address].        Patient here for annual well adult exam and diabetic follow-up      History of Present Illness  The patient presents for evaluation of his feet.    He reports no current health concerns and has been managing well. He has a history of three stent placements.    Medications    Current Outpatient Medications:     insulin lispro, 1 Unit Dial, (HUMALOG KWIKPEN) 100 UNIT/ML SOPN, Inject 35-45 Units into the skin 3 times daily (before meals), Disp: 15 Adjustable Dose Pre-filled Pen Syringe, Rfl: 2    LANTUS SOLOSTAR 100 UNIT/ML injection pen, 80 units daily, Disp: 15 Adjustable Dose Pre-filled Pen Syringe, Rfl: 2    Continuous Glucose Sensor (FREESTYLE VANESSA 3 PLUS SENSOR) MISC, Change sensor every 15 days, Disp: 6 each, Rfl: 3    FLUoxetine (PROZAC) 40 MG capsule, Take 2 capsules by mouth daily Taking 80 mg PO daily, Disp: 60 capsule, Rfl: 2    busPIRone (BUSPAR) 5 MG tablet, Take 1 tablet by mouth 2 times daily, Disp: 60 tablet, Rfl: 1    lisinopril (PRINIVIL;ZESTRIL) 10 MG tablet, Take 1 tablet by mouth daily, Disp: 90 tablet, Rfl: 3    SITagliptin (JANUVIA) 100 MG tablet, Take 1 tablet by mouth daily, Disp: 90 tablet, Rfl: 3    dapagliflozin (FARXIGA) 10 MG tablet, Take 1 tablet by mouth every morning, Disp: 90 tablet, Rfl: 3    omeprazole (PRILOSEC) 40 MG delayed release capsule, Take 1 capsule by mouth daily, Disp: 90 capsule, Rfl: 3    Insulin Syringe-Needle U-100 31G X 5/16\" 0.5 ML MISC, 1 each by Does not apply route daily, Disp: 100

## 2025-05-05 DIAGNOSIS — Z79.4 UNCONTROLLED TYPE 2 DIABETES MELLITUS WITH HYPERGLYCEMIA, WITH LONG-TERM CURRENT USE OF INSULIN (HCC): ICD-10-CM

## 2025-05-05 DIAGNOSIS — E11.9 TYPE 2 DIABETES MELLITUS WITHOUT COMPLICATION, WITH LONG-TERM CURRENT USE OF INSULIN (HCC): Primary | ICD-10-CM

## 2025-05-05 DIAGNOSIS — E11.65 UNCONTROLLED TYPE 2 DIABETES MELLITUS WITH HYPERGLYCEMIA, WITH LONG-TERM CURRENT USE OF INSULIN (HCC): ICD-10-CM

## 2025-05-05 DIAGNOSIS — Z79.4 TYPE 2 DIABETES MELLITUS WITHOUT COMPLICATION, WITH LONG-TERM CURRENT USE OF INSULIN (HCC): Primary | ICD-10-CM

## 2025-05-05 RX ORDER — INSULIN PMP CART,AUT,G6/7,CNTR
1 EACH SUBCUTANEOUS
Qty: 2 EACH | Refills: 5 | Status: SHIPPED | OUTPATIENT
Start: 2025-05-05 | End: 2025-05-07 | Stop reason: SDUPTHER

## 2025-05-05 RX ORDER — INSULIN PMP CART,AUT,G6/7,CNTR
1 EACH SUBCUTANEOUS DAILY
Qty: 1 KIT | Refills: 0 | Status: SHIPPED | OUTPATIENT
Start: 2025-05-05 | End: 2025-05-05 | Stop reason: SDUPTHER

## 2025-05-05 RX ORDER — INSULIN PMP CART,AUT,G6/7,CNTR
1 EACH SUBCUTANEOUS
Qty: 2 EACH | Refills: 5 | Status: SHIPPED | OUTPATIENT
Start: 2025-05-05 | End: 2025-05-05 | Stop reason: SDUPTHER

## 2025-05-05 RX ORDER — INSULIN PMP CART,AUT,G6/7,CNTR
1 EACH SUBCUTANEOUS DAILY
Qty: 1 KIT | Refills: 0 | Status: SHIPPED | OUTPATIENT
Start: 2025-05-05

## 2025-05-07 DIAGNOSIS — E11.65 UNCONTROLLED TYPE 2 DIABETES MELLITUS WITH HYPERGLYCEMIA, WITH LONG-TERM CURRENT USE OF INSULIN (HCC): ICD-10-CM

## 2025-05-07 DIAGNOSIS — Z79.4 UNCONTROLLED TYPE 2 DIABETES MELLITUS WITH HYPERGLYCEMIA, WITH LONG-TERM CURRENT USE OF INSULIN (HCC): ICD-10-CM

## 2025-05-07 RX ORDER — INSULIN PMP CART,AUT,G6/7,CNTR
1 EACH SUBCUTANEOUS
Qty: 15 EACH | Refills: 5 | Status: SHIPPED | OUTPATIENT
Start: 2025-05-07

## 2025-05-08 ENCOUNTER — OFFICE VISIT (OUTPATIENT)
Dept: PSYCHIATRY | Age: 60
End: 2025-05-08
Payer: COMMERCIAL

## 2025-05-08 DIAGNOSIS — G47.00 INSOMNIA, UNSPECIFIED TYPE: ICD-10-CM

## 2025-05-08 DIAGNOSIS — F33.0 MILD EPISODE OF RECURRENT MAJOR DEPRESSIVE DISORDER: Primary | ICD-10-CM

## 2025-05-08 DIAGNOSIS — F43.10 PTSD (POST-TRAUMATIC STRESS DISORDER): ICD-10-CM

## 2025-05-08 DIAGNOSIS — F41.1 GENERALIZED ANXIETY DISORDER: ICD-10-CM

## 2025-05-08 PROBLEM — F43.9 STRESS AT HOME: Status: RESOLVED | Noted: 2023-11-08 | Resolved: 2025-05-08

## 2025-05-08 PROCEDURE — 99214 OFFICE O/P EST MOD 30 MIN: CPT

## 2025-05-08 PROCEDURE — 1036F TOBACCO NON-USER: CPT

## 2025-05-08 PROCEDURE — G8427 DOCREV CUR MEDS BY ELIG CLIN: HCPCS

## 2025-05-08 PROCEDURE — G8417 CALC BMI ABV UP PARAM F/U: HCPCS

## 2025-05-08 PROCEDURE — 3017F COLORECTAL CA SCREEN DOC REV: CPT

## 2025-05-08 ASSESSMENT — ANXIETY QUESTIONNAIRES
5. BEING SO RESTLESS THAT IT IS HARD TO SIT STILL: SEVERAL DAYS
4. TROUBLE RELAXING: SEVERAL DAYS
GAD7 TOTAL SCORE: 10
2. NOT BEING ABLE TO STOP OR CONTROL WORRYING: MORE THAN HALF THE DAYS
IF YOU CHECKED OFF ANY PROBLEMS ON THIS QUESTIONNAIRE, HOW DIFFICULT HAVE THESE PROBLEMS MADE IT FOR YOU TO DO YOUR WORK, TAKE CARE OF THINGS AT HOME, OR GET ALONG WITH OTHER PEOPLE: SOMEWHAT DIFFICULT
6. BECOMING EASILY ANNOYED OR IRRITABLE: SEVERAL DAYS
3. WORRYING TOO MUCH ABOUT DIFFERENT THINGS: MORE THAN HALF THE DAYS
1. FEELING NERVOUS, ANXIOUS, OR ON EDGE: MORE THAN HALF THE DAYS
7. FEELING AFRAID AS IF SOMETHING AWFUL MIGHT HAPPEN: SEVERAL DAYS

## 2025-05-08 ASSESSMENT — PATIENT HEALTH QUESTIONNAIRE - PHQ9
SUM OF ALL RESPONSES TO PHQ QUESTIONS 1-9: 12
4. FEELING TIRED OR HAVING LITTLE ENERGY: MORE THAN HALF THE DAYS
8. MOVING OR SPEAKING SO SLOWLY THAT OTHER PEOPLE COULD HAVE NOTICED. OR THE OPPOSITE, BEING SO FIGETY OR RESTLESS THAT YOU HAVE BEEN MOVING AROUND A LOT MORE THAN USUAL: SEVERAL DAYS
5. POOR APPETITE OR OVEREATING: MORE THAN HALF THE DAYS
1. LITTLE INTEREST OR PLEASURE IN DOING THINGS: MORE THAN HALF THE DAYS
2. FEELING DOWN, DEPRESSED OR HOPELESS: SEVERAL DAYS
SUM OF ALL RESPONSES TO PHQ QUESTIONS 1-9: 12
10. IF YOU CHECKED OFF ANY PROBLEMS, HOW DIFFICULT HAVE THESE PROBLEMS MADE IT FOR YOU TO DO YOUR WORK, TAKE CARE OF THINGS AT HOME, OR GET ALONG WITH OTHER PEOPLE: VERY DIFFICULT
6. FEELING BAD ABOUT YOURSELF - OR THAT YOU ARE A FAILURE OR HAVE LET YOURSELF OR YOUR FAMILY DOWN: SEVERAL DAYS
SUM OF ALL RESPONSES TO PHQ QUESTIONS 1-9: 12
7. TROUBLE CONCENTRATING ON THINGS, SUCH AS READING THE NEWSPAPER OR WATCHING TELEVISION: SEVERAL DAYS
SUM OF ALL RESPONSES TO PHQ QUESTIONS 1-9: 12
3. TROUBLE FALLING OR STAYING ASLEEP: MORE THAN HALF THE DAYS

## 2025-05-08 NOTE — PATIENT INSTRUCTIONS
-Please take medications as prescribed  -Refrain from alcohol or drug use  -Seek emergency help via the emergency and/or calling 911 should symptoms become severe, worsen, or with other concerning symptoms.Go immediately to the emergency room and/or call 911 with any suicidal or homicidal ideations or if audio/visual hallucinations develop.   -Contact office with any questions or concerns.     Crisis phone numbers -253 Crisis Line  [facility] [phone].  [facility] [phone]  [facility] [phone].  [facility] [phone].  [facility] [phone].  [facility] [phone].

## 2025-05-23 ENCOUNTER — TELEPHONE (OUTPATIENT)
Dept: FAMILY MEDICINE CLINIC | Age: 60
End: 2025-05-23

## 2025-05-23 DIAGNOSIS — E11.9 TYPE 2 DIABETES MELLITUS WITHOUT COMPLICATION, WITH LONG-TERM CURRENT USE OF INSULIN (HCC): Primary | ICD-10-CM

## 2025-05-23 DIAGNOSIS — Z79.4 TYPE 2 DIABETES MELLITUS WITHOUT COMPLICATION, WITH LONG-TERM CURRENT USE OF INSULIN (HCC): Primary | ICD-10-CM

## 2025-05-23 DIAGNOSIS — E11.65 HYPERGLYCEMIA DUE TO DIABETES MELLITUS (HCC): ICD-10-CM

## 2025-05-23 RX ORDER — BLOOD-GLUCOSE SENSOR
1 EACH MISCELLANEOUS
Qty: 6 EACH | Refills: 1 | Status: SHIPPED | OUTPATIENT
Start: 2025-05-23 | End: 2025-05-23

## 2025-05-23 RX ORDER — PROCHLORPERAZINE 25 MG/1
1 SUPPOSITORY RECTAL CONTINUOUS
Qty: 1 EACH | Refills: 0 | Status: SHIPPED | OUTPATIENT
Start: 2025-05-23

## 2025-05-23 RX ORDER — PROCHLORPERAZINE 25 MG/1
1 SUPPOSITORY RECTAL
Qty: 9 EACH | Refills: 1 | Status: SHIPPED | OUTPATIENT
Start: 2025-05-23 | End: 2025-06-13

## 2025-06-09 DIAGNOSIS — E10.9 TYPE 1 DIABETES MELLITUS WITHOUT COMPLICATION (HCC): Primary | ICD-10-CM

## 2025-06-10 ENCOUNTER — OFFICE VISIT (OUTPATIENT)
Age: 60
End: 2025-06-10
Payer: COMMERCIAL

## 2025-06-10 VITALS
SYSTOLIC BLOOD PRESSURE: 118 MMHG | HEART RATE: 60 BPM | DIASTOLIC BLOOD PRESSURE: 62 MMHG | TEMPERATURE: 98 F | BODY MASS INDEX: 35.46 KG/M2 | HEIGHT: 67 IN | RESPIRATION RATE: 16 BRPM | WEIGHT: 225.9 LBS | OXYGEN SATURATION: 95 %

## 2025-06-10 DIAGNOSIS — G47.33 OSA (OBSTRUCTIVE SLEEP APNEA): Primary | ICD-10-CM

## 2025-06-10 DIAGNOSIS — E66.812 CLASS 2 OBESITY WITH BODY MASS INDEX (BMI) OF 35.0 TO 35.9 IN ADULT, UNSPECIFIED OBESITY TYPE, UNSPECIFIED WHETHER SERIOUS COMORBIDITY PRESENT: ICD-10-CM

## 2025-06-10 PROCEDURE — 3074F SYST BP LT 130 MM HG: CPT

## 2025-06-10 PROCEDURE — 3078F DIAST BP <80 MM HG: CPT

## 2025-06-10 PROCEDURE — 99214 OFFICE O/P EST MOD 30 MIN: CPT

## 2025-06-10 RX ORDER — NITROGLYCERIN 0.4 MG/1
TABLET SUBLINGUAL
COMMUNITY
Start: 2025-02-13

## 2025-06-10 RX ORDER — TICAGRELOR 90 MG/1
TABLET, FILM COATED ORAL
COMMUNITY
Start: 2025-02-14

## 2025-06-10 RX ORDER — ASPIRIN 81 MG/1
TABLET ORAL
COMMUNITY
Start: 2025-02-13

## 2025-06-10 NOTE — PROGRESS NOTES
2020        Right shoulder arthroscopy cam procedure extensive debridement, BT, CTR, ulnar nerve decompression                      TONSILLECTOMY  1973    addenoidectomy     SOCIAL HISTORY:  Social History     Tobacco Use    Smoking status: Former     Current packs/day: 0.00     Average packs/day: 1 pack/day for 25.0 years (25.0 ttl pk-yrs)     Types: Cigarettes     Start date: 1980     Quit date: 2005     Years since quittin.9    Smokeless tobacco: Never   Vaping Use    Vaping status: Never Used   Substance Use Topics    Alcohol use: Not Currently     Comment: socially wine; alcohol abuse in the past 6119-4797    Drug use: Never     ALLERGIES:  Allergies   Allergen Reactions    Iodides Other (See Comments)    Penicillins Anaphylaxis and Other (See Comments)    Contrast [Gadolinium Derivatives] Rash     Red Man Syndrome     FAMILY HISTORY:  Family History   Problem Relation Age of Onset    Anxiety Disorder Mother     Heart Disease Mother     Atrial Fibrillation Mother     Diabetes Mother     High Blood Pressure Mother     Heart Disease Father     Heart Attack Father     High Blood Pressure Father     High Cholesterol Father     Hypertension Father     No Known Problems Sister     No Known Problems Sister     Cancer Paternal Grandfather         Colon    Stroke Paternal Grandfather      CURRENT MEDICATIONS:  Current Outpatient Medications   Medication Sig Dispense Refill    aspirin 81 MG EC tablet Take by mouth      nitroGLYCERIN (NITROSTAT) 0.4 MG SL tablet Place under the tongue      ticagrelor (BRILINTA) 90 MG TABS tablet Take by mouth      insulin lispro (HUMALOG KWIKPEN U-200) 200 UNIT/ML SOPN pen Inject 200 Units into the skin every 3 days For use in OmniPod insulin pump only. 10 Adjustable Dose Pre-filled Pen Syringe 1    Continuous Glucose Sensor (DEXCOM G6 SENSOR) MISC 1 each by Does not apply route every 10 days for 3 doses 9 each 1    Continuous Glucose Transmitter (DEXCOM G6

## 2025-06-11 DIAGNOSIS — E10.9 TYPE 1 DIABETES MELLITUS WITHOUT COMPLICATION (HCC): ICD-10-CM

## 2025-06-19 ENCOUNTER — OFFICE VISIT (OUTPATIENT)
Dept: PSYCHIATRY | Age: 60
End: 2025-06-19
Payer: COMMERCIAL

## 2025-06-19 DIAGNOSIS — F43.10 PTSD (POST-TRAUMATIC STRESS DISORDER): ICD-10-CM

## 2025-06-19 DIAGNOSIS — F41.1 GENERALIZED ANXIETY DISORDER: ICD-10-CM

## 2025-06-19 DIAGNOSIS — F33.41 RECURRENT MAJOR DEPRESSIVE DISORDER, IN PARTIAL REMISSION: Primary | ICD-10-CM

## 2025-06-19 PROCEDURE — G8417 CALC BMI ABV UP PARAM F/U: HCPCS

## 2025-06-19 PROCEDURE — 1036F TOBACCO NON-USER: CPT

## 2025-06-19 PROCEDURE — 99214 OFFICE O/P EST MOD 30 MIN: CPT

## 2025-06-19 PROCEDURE — G8428 CUR MEDS NOT DOCUMENT: HCPCS

## 2025-06-19 PROCEDURE — 3017F COLORECTAL CA SCREEN DOC REV: CPT

## 2025-06-19 RX ORDER — FLUOXETINE HYDROCHLORIDE 40 MG/1
80 CAPSULE ORAL DAILY
Qty: 60 CAPSULE | Refills: 2 | Status: SHIPPED | OUTPATIENT
Start: 2025-06-19 | End: 2025-09-17

## 2025-06-19 ASSESSMENT — PATIENT HEALTH QUESTIONNAIRE - PHQ9
SUM OF ALL RESPONSES TO PHQ QUESTIONS 1-9: 11
8. MOVING OR SPEAKING SO SLOWLY THAT OTHER PEOPLE COULD HAVE NOTICED. OR THE OPPOSITE, BEING SO FIGETY OR RESTLESS THAT YOU HAVE BEEN MOVING AROUND A LOT MORE THAN USUAL: SEVERAL DAYS
5. POOR APPETITE OR OVEREATING: MORE THAN HALF THE DAYS
9. THOUGHTS THAT YOU WOULD BE BETTER OFF DEAD, OR OF HURTING YOURSELF: NOT AT ALL
4. FEELING TIRED OR HAVING LITTLE ENERGY: MORE THAN HALF THE DAYS
2. FEELING DOWN, DEPRESSED OR HOPELESS: SEVERAL DAYS
7. TROUBLE CONCENTRATING ON THINGS, SUCH AS READING THE NEWSPAPER OR WATCHING TELEVISION: SEVERAL DAYS
SUM OF ALL RESPONSES TO PHQ QUESTIONS 1-9: 11
3. TROUBLE FALLING OR STAYING ASLEEP: MORE THAN HALF THE DAYS
1. LITTLE INTEREST OR PLEASURE IN DOING THINGS: SEVERAL DAYS
SUM OF ALL RESPONSES TO PHQ QUESTIONS 1-9: 11
6. FEELING BAD ABOUT YOURSELF - OR THAT YOU ARE A FAILURE OR HAVE LET YOURSELF OR YOUR FAMILY DOWN: SEVERAL DAYS
SUM OF ALL RESPONSES TO PHQ QUESTIONS 1-9: 11
10. IF YOU CHECKED OFF ANY PROBLEMS, HOW DIFFICULT HAVE THESE PROBLEMS MADE IT FOR YOU TO DO YOUR WORK, TAKE CARE OF THINGS AT HOME, OR GET ALONG WITH OTHER PEOPLE: SOMEWHAT DIFFICULT

## 2025-06-19 NOTE — PATIENT INSTRUCTIONS
-Please take medications as prescribed  -Refrain from alcohol or drug use  -Seek emergency help via the emergency and/or calling 911 should symptoms become severe, worsen, or with other concerning symptoms.Go immediately to the emergency room and/or call 911 with any suicidal or homicidal ideations or if audio/visual hallucinations develop.   -Contact office with any questions or concerns.     Crisis phone numbers -559 Crisis Line  [facility] [phone].  [facility] [phone]  [facility] [phone].  [facility] [phone].  [facility] [phone].  [facility] [phone].

## 2025-06-19 NOTE — PROGRESS NOTES
care of things at home, or get along with other people?: Somewhat difficult  PHQ-9 Total Score: 11  PHQ-9 Total Score: 11  Assessment & Plan   DIAGNOSIS AND ASSESSMENT DATA     DIAGNOSIS:   1. Recurrent major depressive disorder, in partial remission    2. Generalized anxiety disorder    3. PTSD (post-traumatic stress disorder)        Rule out bipolar spectrum disorder  Rule out panic disorder    PLAN   Follow-up:  Return in about 4 months (around 10/19/2025), or if symptoms worsen or fail to improve.    Prescriptions for this encounter:  New Prescriptions    No medications on file       Orders Placed This Encounter   Medications    FLUoxetine (PROZAC) 40 MG capsule     Sig: Take 2 capsules by mouth daily Taking 80 mg PO daily     Dispense:  60 capsule     Refill:  2         Medications Discontinued During This Encounter   Medication Reason    FLUoxetine (PROZAC) 40 MG capsule REORDER         Additional orders:  No orders of the defined types were placed in this encounter.      Condition: Patient appears in  no acute distress  Patient endorses symptoms of depression are well managed overall   reports anxiety is manageable .   Patient reports poor quality of sleep, spent time discussing sleep hygiene with patient.  He follows with sleep medicine for treatment of sleep apnea and reports compliance with CPAP.  Discussed the importance of individual psychotherapy and management of anxiety and mood, he declines need at this time. Patient is encouraged to utilize nonpharmacologic coping skills such as deep breathing, guided imagery, guided meditation, muscle relaxation, calming music, and/or journaling.  Discussed treatment options with patient including, he wishes to resume medications as rx.    Support and reassurance given. All questions answered. Patient stated understanding and is agreeable to treatment plan. Encouraged to call office with any questions or concerns.    Safety Assessment:  There was no indication of

## 2025-07-11 ENCOUNTER — PATIENT MESSAGE (OUTPATIENT)
Dept: FAMILY MEDICINE CLINIC | Age: 60
End: 2025-07-11

## 2025-07-11 DIAGNOSIS — Z79.4 TYPE 2 DIABETES MELLITUS WITHOUT COMPLICATION, WITH LONG-TERM CURRENT USE OF INSULIN (HCC): Primary | ICD-10-CM

## 2025-07-11 DIAGNOSIS — E11.9 TYPE 2 DIABETES MELLITUS WITHOUT COMPLICATION, WITH LONG-TERM CURRENT USE OF INSULIN (HCC): Primary | ICD-10-CM

## 2025-07-11 RX ORDER — BLOOD-GLUCOSE SENSOR
1 EACH MISCELLANEOUS
Qty: 2 EACH | Refills: 2 | Status: SHIPPED | OUTPATIENT
Start: 2025-07-11 | End: 2025-08-08